# Patient Record
Sex: FEMALE | Race: OTHER | NOT HISPANIC OR LATINO
[De-identification: names, ages, dates, MRNs, and addresses within clinical notes are randomized per-mention and may not be internally consistent; named-entity substitution may affect disease eponyms.]

---

## 2017-11-13 PROBLEM — Z00.00 ENCOUNTER FOR PREVENTIVE HEALTH EXAMINATION: Status: ACTIVE | Noted: 2017-11-13

## 2020-03-31 ENCOUNTER — LABORATORY RESULT (OUTPATIENT)
Age: 55
End: 2020-03-31

## 2020-04-26 ENCOUNTER — MESSAGE (OUTPATIENT)
Age: 55
End: 2020-04-26

## 2020-04-29 LAB
SARS-COV-2 IGG SERPL IA-ACNC: 3.6 INDEX
SARS-COV-2 IGG SERPL QL IA: POSITIVE

## 2020-12-29 ENCOUNTER — NON-APPOINTMENT (OUTPATIENT)
Age: 55
End: 2020-12-29

## 2021-01-25 ENCOUNTER — LABORATORY RESULT (OUTPATIENT)
Age: 56
End: 2021-01-25

## 2021-01-25 ENCOUNTER — APPOINTMENT (OUTPATIENT)
Dept: RHEUMATOLOGY | Facility: CLINIC | Age: 56
End: 2021-01-25
Payer: COMMERCIAL

## 2021-01-25 VITALS
HEART RATE: 78 BPM | SYSTOLIC BLOOD PRESSURE: 127 MMHG | OXYGEN SATURATION: 98 % | HEIGHT: 65 IN | WEIGHT: 165 LBS | BODY MASS INDEX: 27.49 KG/M2 | TEMPERATURE: 97.2 F | DIASTOLIC BLOOD PRESSURE: 90 MMHG

## 2021-01-25 DIAGNOSIS — Z86.79 PERSONAL HISTORY OF OTHER DISEASES OF THE CIRCULATORY SYSTEM: ICD-10-CM

## 2021-01-25 DIAGNOSIS — Z82.61 FAMILY HISTORY OF ARTHRITIS: ICD-10-CM

## 2021-01-25 DIAGNOSIS — Z78.9 OTHER SPECIFIED HEALTH STATUS: ICD-10-CM

## 2021-01-25 DIAGNOSIS — M25.50 PAIN IN UNSPECIFIED JOINT: ICD-10-CM

## 2021-01-25 DIAGNOSIS — Z87.19 PERSONAL HISTORY OF OTHER DISEASES OF THE DIGESTIVE SYSTEM: ICD-10-CM

## 2021-01-25 DIAGNOSIS — Z82.49 FAMILY HISTORY OF ISCHEMIC HEART DISEASE AND OTHER DISEASES OF THE CIRCULATORY SYSTEM: ICD-10-CM

## 2021-01-25 DIAGNOSIS — Z80.1 FAMILY HISTORY OF MALIGNANT NEOPLASM OF TRACHEA, BRONCHUS AND LUNG: ICD-10-CM

## 2021-01-25 PROCEDURE — 99072 ADDL SUPL MATRL&STAF TM PHE: CPT

## 2021-01-25 PROCEDURE — 99203 OFFICE O/P NEW LOW 30 MIN: CPT

## 2021-01-25 RX ORDER — DICLOFENAC SODIUM 1% 10 MG/G
1 GEL TOPICAL
Qty: 1 | Refills: 2 | Status: ACTIVE | COMMUNITY
Start: 2021-01-25 | End: 1900-01-01

## 2021-01-25 RX ORDER — PRASTERONE (DHEA) 50 MG
CAPSULE ORAL
Refills: 0 | Status: ACTIVE | COMMUNITY

## 2021-01-25 RX ORDER — AMLODIPINE BESYLATE 5 MG/1
5 TABLET ORAL
Refills: 0 | Status: ACTIVE | COMMUNITY

## 2021-01-25 RX ORDER — CYCLOBENZAPRINE HYDROCHLORIDE 10 MG/1
10 TABLET, FILM COATED ORAL
Qty: 60 | Refills: 0 | Status: ACTIVE | COMMUNITY
Start: 2020-10-05

## 2021-01-27 ENCOUNTER — OUTPATIENT (OUTPATIENT)
Dept: OUTPATIENT SERVICES | Facility: HOSPITAL | Age: 56
LOS: 1 days | End: 2021-01-27
Payer: COMMERCIAL

## 2021-01-27 ENCOUNTER — RESULT REVIEW (OUTPATIENT)
Age: 56
End: 2021-01-27

## 2021-01-27 ENCOUNTER — APPOINTMENT (OUTPATIENT)
Dept: RADIOLOGY | Facility: CLINIC | Age: 56
End: 2021-01-27
Payer: COMMERCIAL

## 2021-01-27 DIAGNOSIS — Z00.8 ENCOUNTER FOR OTHER GENERAL EXAMINATION: ICD-10-CM

## 2021-01-27 PROCEDURE — 73130 X-RAY EXAM OF HAND: CPT | Mod: 26,50

## 2021-01-27 PROCEDURE — 73502 X-RAY EXAM HIP UNI 2-3 VIEWS: CPT

## 2021-01-27 PROCEDURE — 73130 X-RAY EXAM OF HAND: CPT

## 2021-01-27 PROCEDURE — 72100 X-RAY EXAM L-S SPINE 2/3 VWS: CPT | Mod: 26

## 2021-01-27 PROCEDURE — 72100 X-RAY EXAM L-S SPINE 2/3 VWS: CPT

## 2021-01-27 PROCEDURE — 73502 X-RAY EXAM HIP UNI 2-3 VIEWS: CPT | Mod: 26,LT

## 2021-01-29 LAB
25(OH)D3 SERPL-MCNC: 34.3 NG/ML
ALBUMIN SERPL ELPH-MCNC: 4.6 G/DL
ALP BLD-CCNC: 73 U/L
ALT SERPL-CCNC: 20 U/L
ANION GAP SERPL CALC-SCNC: 15 MMOL/L
APPEARANCE: CLEAR
AST SERPL-CCNC: 17 U/L
BASOPHILS # BLD AUTO: 0.04 K/UL
BASOPHILS NFR BLD AUTO: 0.9 %
BILIRUB SERPL-MCNC: 0.6 MG/DL
BILIRUBIN URINE: NEGATIVE
BLOOD URINE: ABNORMAL
BUN SERPL-MCNC: 15 MG/DL
CALCIUM SERPL-MCNC: 9.7 MG/DL
CCP AB SER IA-ACNC: <8 UNITS
CHLORIDE SERPL-SCNC: 103 MMOL/L
CHOLEST SERPL-MCNC: 192 MG/DL
CK SERPL-CCNC: 121 U/L
CO2 SERPL-SCNC: 23 MMOL/L
COLOR: NORMAL
CREAT SERPL-MCNC: 0.78 MG/DL
CREAT SPEC-SCNC: 159 MG/DL
CREAT/PROT UR: 0.1 RATIO
EOSINOPHIL # BLD AUTO: 0.09 K/UL
EOSINOPHIL NFR BLD AUTO: 1.9 %
ERYTHROCYTE [SEDIMENTATION RATE] IN BLOOD BY WESTERGREN METHOD: 7 MM/HR
ESTIMATED AVERAGE GLUCOSE: 103 MG/DL
GLUCOSE QUALITATIVE U: NEGATIVE
GLUCOSE SERPL-MCNC: 80 MG/DL
HBA1C MFR BLD HPLC: 5.2 %
HCT VFR BLD CALC: 46.4 %
HDLC SERPL-MCNC: 56 MG/DL
HGB BLD-MCNC: 14.7 G/DL
IMM GRANULOCYTES NFR BLD AUTO: 0.2 %
KETONES URINE: NEGATIVE
LDLC SERPL CALC-MCNC: 120 MG/DL
LEUKOCYTE ESTERASE URINE: ABNORMAL
LYMPHOCYTES # BLD AUTO: 1.8 K/UL
LYMPHOCYTES NFR BLD AUTO: 39 %
MAN DIFF?: NORMAL
MCHC RBC-ENTMCNC: 30.6 PG
MCHC RBC-ENTMCNC: 31.7 GM/DL
MCV RBC AUTO: 96.7 FL
MONOCYTES # BLD AUTO: 0.39 K/UL
MONOCYTES NFR BLD AUTO: 8.4 %
NEUTROPHILS # BLD AUTO: 2.29 K/UL
NEUTROPHILS NFR BLD AUTO: 49.6 %
NITRITE URINE: NEGATIVE
NONHDLC SERPL-MCNC: 136 MG/DL
PH URINE: 6
PLATELET # BLD AUTO: 230 K/UL
POTASSIUM SERPL-SCNC: 4.2 MMOL/L
PROT SERPL-MCNC: 7.1 G/DL
PROT UR-MCNC: 11 MG/DL
PROTEIN URINE: NEGATIVE
RBC # BLD: 4.8 M/UL
RBC # FLD: 12.3 %
RF+CCP IGG SER-IMP: NEGATIVE
RHEUMATOID FACT SER QL: 294 IU/ML
SODIUM SERPL-SCNC: 142 MMOL/L
SPECIFIC GRAVITY URINE: >=1.03
TRIGL SERPL-MCNC: 79 MG/DL
TSH SERPL-ACNC: 1.16 UIU/ML
UROBILINOGEN URINE: NORMAL
WBC # FLD AUTO: 4.62 K/UL

## 2021-02-09 ENCOUNTER — TRANSCRIPTION ENCOUNTER (OUTPATIENT)
Age: 56
End: 2021-02-09

## 2021-02-16 ENCOUNTER — APPOINTMENT (OUTPATIENT)
Dept: GASTROENTEROLOGY | Facility: CLINIC | Age: 56
End: 2021-02-16
Payer: COMMERCIAL

## 2021-02-16 VITALS
WEIGHT: 164 LBS | BODY MASS INDEX: 27.32 KG/M2 | TEMPERATURE: 97.1 F | SYSTOLIC BLOOD PRESSURE: 138 MMHG | HEIGHT: 65 IN | DIASTOLIC BLOOD PRESSURE: 90 MMHG

## 2021-02-16 DIAGNOSIS — M19.90 UNSPECIFIED OSTEOARTHRITIS, UNSPECIFIED SITE: ICD-10-CM

## 2021-02-16 DIAGNOSIS — Z20.822 CONTACT WITH AND (SUSPECTED) EXPOSURE TO COVID-19: ICD-10-CM

## 2021-02-16 DIAGNOSIS — Z12.11 ENCOUNTER FOR SCREENING FOR MALIGNANT NEOPLASM OF COLON: ICD-10-CM

## 2021-02-16 DIAGNOSIS — Z01.818 ENCOUNTER FOR OTHER PREPROCEDURAL EXAMINATION: ICD-10-CM

## 2021-02-16 DIAGNOSIS — S82.853S: ICD-10-CM

## 2021-02-16 DIAGNOSIS — Z86.79 PERSONAL HISTORY OF OTHER DISEASES OF THE CIRCULATORY SYSTEM: ICD-10-CM

## 2021-02-16 DIAGNOSIS — M51.9 UNSPECIFIED THORACIC, THORACOLUMBAR AND LUMBOSACRAL INTERVERTEBRAL DISC DISORDER: ICD-10-CM

## 2021-02-16 PROCEDURE — 99203 OFFICE O/P NEW LOW 30 MIN: CPT

## 2021-02-16 PROCEDURE — 99072 ADDL SUPL MATRL&STAF TM PHE: CPT

## 2021-02-16 RX ORDER — SODIUM SULFATE, POTASSIUM SULFATE, MAGNESIUM SULFATE 17.5; 3.13; 1.6 G/ML; G/ML; G/ML
17.5-3.13-1.6 SOLUTION, CONCENTRATE ORAL
Qty: 1 | Refills: 0 | Status: ACTIVE | COMMUNITY
Start: 2021-02-16 | End: 1900-01-01

## 2021-02-16 NOTE — HISTORY OF PRESENT ILLNESS
[FreeTextEntry1] : Patient returns to the office today for arrangement of colonoscopy. It has been several years since last examination. Patient has history of adenomatous polyps. Patient looks and feels well and offers no complaints except for recent low back pain. Patient has a herniated disc in the lumbar area. She denies nausea vomiting fever chills rectal bleeding or melena.

## 2021-02-16 NOTE — ASSESSMENT
[FreeTextEntry1] : Impression and plan\par \par Patient came to the office today to arrange for colonoscopy. She has a history of adenomatous polyps The risks benefits alternatives and limitations of procedure were discussed. Further suggestions will follow pending above.patient will schedule examination when her back is feeling better.

## 2021-03-09 ENCOUNTER — APPOINTMENT (OUTPATIENT)
Dept: GASTROENTEROLOGY | Facility: AMBULATORY MEDICAL SERVICES | Age: 56
End: 2021-03-09

## 2021-04-09 LAB — SARS-COV-2 N GENE NPH QL NAA+PROBE: NOT DETECTED

## 2021-04-10 RX ORDER — SODIUM PICOSULFATE, MAGNESIUM OXIDE, AND ANHYDROUS CITRIC ACID 10; 3.5; 12 MG/160ML; G/160ML; G/160ML
10-3.5-12 MG-GM LIQUID ORAL
Qty: 1 | Refills: 0 | Status: ACTIVE | COMMUNITY
Start: 2021-04-10 | End: 1900-01-01

## 2021-04-13 ENCOUNTER — APPOINTMENT (OUTPATIENT)
Dept: GASTROENTEROLOGY | Facility: AMBULATORY MEDICAL SERVICES | Age: 56
End: 2021-04-13
Payer: COMMERCIAL

## 2021-04-13 PROCEDURE — 45380 COLONOSCOPY AND BIOPSY: CPT

## 2021-06-14 ENCOUNTER — TRANSCRIPTION ENCOUNTER (OUTPATIENT)
Age: 56
End: 2021-06-14

## 2021-06-24 ENCOUNTER — TRANSCRIPTION ENCOUNTER (OUTPATIENT)
Age: 56
End: 2021-06-24

## 2021-07-06 ENCOUNTER — APPOINTMENT (OUTPATIENT)
Dept: GASTROENTEROLOGY | Facility: AMBULATORY MEDICAL SERVICES | Age: 56
End: 2021-07-06
Payer: COMMERCIAL

## 2021-07-06 PROCEDURE — 45378 DIAGNOSTIC COLONOSCOPY: CPT

## 2021-12-26 ENCOUNTER — RESULT CHARGE (OUTPATIENT)
Age: 56
End: 2021-12-26

## 2021-12-28 ENCOUNTER — RESULT CHARGE (OUTPATIENT)
Age: 56
End: 2021-12-28

## 2021-12-28 LAB
SARS-COV-2 AG RESP QL IA.RAPID: NEGATIVE
SARS-COV-2 AG RESP QL IA.RAPID: NEGATIVE

## 2023-06-28 DIAGNOSIS — R10.10 UPPER ABDOMINAL PAIN, UNSPECIFIED: ICD-10-CM

## 2023-06-30 ENCOUNTER — TRANSCRIPTION ENCOUNTER (OUTPATIENT)
Age: 58
End: 2023-06-30

## 2023-07-02 LAB
ALBUMIN SERPL ELPH-MCNC: 4.7 G/DL
ALP BLD-CCNC: 78 U/L
ALT SERPL-CCNC: 230 U/L
AMYLASE/CREAT SERPL: 43 U/L
ANION GAP SERPL CALC-SCNC: 14 MMOL/L
AST SERPL-CCNC: 366 U/L
BILIRUB SERPL-MCNC: 1.4 MG/DL
BUN SERPL-MCNC: 15 MG/DL
CALCIUM SERPL-MCNC: 9.9 MG/DL
CHLORIDE SERPL-SCNC: 102 MMOL/L
CHOLEST SERPL-MCNC: 202 MG/DL
CO2 SERPL-SCNC: 25 MMOL/L
CREAT SERPL-MCNC: 0.7 MG/DL
EGFR: 100 ML/MIN/1.73M2
GLUCOSE SERPL-MCNC: 93 MG/DL
HDLC SERPL-MCNC: 54 MG/DL
LDH SERPL-CCNC: 472 U/L
LDLC SERPL CALC-MCNC: 131 MG/DL
LPL SERPL-CCNC: 32 U/L
NONHDLC SERPL-MCNC: 148 MG/DL
POTASSIUM SERPL-SCNC: 4.1 MMOL/L
PROT SERPL-MCNC: 7.1 G/DL
SODIUM SERPL-SCNC: 141 MMOL/L
TRIGL SERPL-MCNC: 85 MG/DL
TSH SERPL-ACNC: 0.58 UIU/ML

## 2023-07-13 ENCOUNTER — APPOINTMENT (OUTPATIENT)
Dept: GASTROENTEROLOGY | Facility: CLINIC | Age: 58
End: 2023-07-13
Payer: COMMERCIAL

## 2023-07-13 VITALS
SYSTOLIC BLOOD PRESSURE: 118 MMHG | TEMPERATURE: 97.5 F | BODY MASS INDEX: 26.33 KG/M2 | WEIGHT: 158 LBS | HEIGHT: 65 IN | DIASTOLIC BLOOD PRESSURE: 80 MMHG

## 2023-07-13 PROCEDURE — 99214 OFFICE O/P EST MOD 30 MIN: CPT

## 2023-07-13 NOTE — HISTORY OF PRESENT ILLNESS
[FreeTextEntry1] : Patient came to the office today to review recent events.  Patient was hospitalized with acute epigastric pain and was found to have rising transaminase levels associated with elevated bilirubin and alk phos.  The patient pain was quite severe prompting hospitalization.  During that stay she underwent testing including CT and MRCP.  These tests revealed previous cholecystectomy as well as physiologically dilated CBD but no stones demonstrated.  Patient also has large renal cysts which are chronic.  The patient tells me that she lingered for several days in the hospital and was discharged with improvement in liver studies but not normalization.  At this time she feels well and offers no complaints of nausea vomiting fever chills rectal bleeding melena or abdominal pain.  She is concerned why she had this transient spike and if it could happen again.  The patient's laboratory studies and scan results are translated into the chart from recent hospitalization.
Anxiety    Dementia    HTN (hypertension)    Spinal stenosis

## 2023-07-13 NOTE — ASSESSMENT
[FreeTextEntry1] : Impression and plan\par Patient had recent hospitalization prompted by cute onset of epigastric discomfort.  The patient has had previous cholecystectomy and during hospitalization transaminase readings were elevated along with alkaline phosphatase and bilirubin.  These numbers normalized during her hospital stay and most recently AST was normal and ALT remained at approximately 100.  The patient denies current pain and describes both acute onset of abdominal pain and almost instantaneous relief.  CT scanning and MR CP were unrevealing as to an etiology.\par My suspicion is that patient had transient CBD obstruction and perhaps passed an unseen stone resulting in transient elevation of her numbers now normalizing.  The rapid resolution of symptoms and improvement in serology supports this.  Patient is scheduled to have repeat blood work tomorrow to see if there is a continued improving trend.  I will suggest that patient have endoscopic ultrasound through the [facility] team to assure that she has no residual stones or other despite negative MRCP.

## 2023-07-14 ENCOUNTER — NON-APPOINTMENT (OUTPATIENT)
Age: 58
End: 2023-07-14

## 2023-07-18 ENCOUNTER — OUTPATIENT (OUTPATIENT)
Dept: OUTPATIENT SERVICES | Facility: HOSPITAL | Age: 58
LOS: 1 days | End: 2023-07-18
Payer: COMMERCIAL

## 2023-07-18 VITALS
RESPIRATION RATE: 18 BRPM | TEMPERATURE: 98 F | DIASTOLIC BLOOD PRESSURE: 85 MMHG | HEART RATE: 73 BPM | OXYGEN SATURATION: 98 % | WEIGHT: 158.95 LBS | HEIGHT: 64.5 IN | SYSTOLIC BLOOD PRESSURE: 136 MMHG

## 2023-07-18 DIAGNOSIS — Z90.49 ACQUIRED ABSENCE OF OTHER SPECIFIED PARTS OF DIGESTIVE TRACT: Chronic | ICD-10-CM

## 2023-07-18 DIAGNOSIS — R74.8 ABNORMAL LEVELS OF OTHER SERUM ENZYMES: ICD-10-CM

## 2023-07-18 DIAGNOSIS — I10 ESSENTIAL (PRIMARY) HYPERTENSION: ICD-10-CM

## 2023-07-18 DIAGNOSIS — S82.852S DISPLACED TRIMALLEOLAR FRACTURE OF LEFT LOWER LEG, SEQUELA: Chronic | ICD-10-CM

## 2023-07-18 DIAGNOSIS — Z98.890 OTHER SPECIFIED POSTPROCEDURAL STATES: Chronic | ICD-10-CM

## 2023-07-18 LAB
ALBUMIN SERPL ELPH-MCNC: 4.5 G/DL — SIGNIFICANT CHANGE UP (ref 3.3–5)
ALP SERPL-CCNC: 71 U/L — SIGNIFICANT CHANGE UP (ref 40–120)
ALT FLD-CCNC: 25 U/L — SIGNIFICANT CHANGE UP (ref 4–33)
ANION GAP SERPL CALC-SCNC: 12 MMOL/L — SIGNIFICANT CHANGE UP (ref 7–14)
AST SERPL-CCNC: 16 U/L — SIGNIFICANT CHANGE UP (ref 4–32)
BILIRUB SERPL-MCNC: 0.3 MG/DL — SIGNIFICANT CHANGE UP (ref 0.2–1.2)
BUN SERPL-MCNC: 16 MG/DL — SIGNIFICANT CHANGE UP (ref 7–23)
CALCIUM SERPL-MCNC: 9.7 MG/DL — SIGNIFICANT CHANGE UP (ref 8.4–10.5)
CHLORIDE SERPL-SCNC: 105 MMOL/L — SIGNIFICANT CHANGE UP (ref 98–107)
CO2 SERPL-SCNC: 27 MMOL/L — SIGNIFICANT CHANGE UP (ref 22–31)
CREAT SERPL-MCNC: 0.96 MG/DL — SIGNIFICANT CHANGE UP (ref 0.5–1.3)
EGFR: 69 ML/MIN/1.73M2 — SIGNIFICANT CHANGE UP
GLUCOSE SERPL-MCNC: 83 MG/DL — SIGNIFICANT CHANGE UP (ref 70–99)
HCT VFR BLD CALC: 41 % — SIGNIFICANT CHANGE UP (ref 34.5–45)
HGB BLD-MCNC: 13.3 G/DL — SIGNIFICANT CHANGE UP (ref 11.5–15.5)
MCHC RBC-ENTMCNC: 30.7 PG — SIGNIFICANT CHANGE UP (ref 27–34)
MCHC RBC-ENTMCNC: 32.4 GM/DL — SIGNIFICANT CHANGE UP (ref 32–36)
MCV RBC AUTO: 94.7 FL — SIGNIFICANT CHANGE UP (ref 80–100)
NRBC # BLD: 0 /100 WBCS — SIGNIFICANT CHANGE UP (ref 0–0)
NRBC # FLD: 0 K/UL — SIGNIFICANT CHANGE UP (ref 0–0)
PLATELET # BLD AUTO: 277 K/UL — SIGNIFICANT CHANGE UP (ref 150–400)
POTASSIUM SERPL-MCNC: 4.7 MMOL/L — SIGNIFICANT CHANGE UP (ref 3.5–5.3)
POTASSIUM SERPL-SCNC: 4.7 MMOL/L — SIGNIFICANT CHANGE UP (ref 3.5–5.3)
PROT SERPL-MCNC: 6.8 G/DL — SIGNIFICANT CHANGE UP (ref 6–8.3)
RBC # BLD: 4.33 M/UL — SIGNIFICANT CHANGE UP (ref 3.8–5.2)
RBC # FLD: 12.2 % — SIGNIFICANT CHANGE UP (ref 10.3–14.5)
SODIUM SERPL-SCNC: 144 MMOL/L — SIGNIFICANT CHANGE UP (ref 135–145)
WBC # BLD: 6.05 K/UL — SIGNIFICANT CHANGE UP (ref 3.8–10.5)
WBC # FLD AUTO: 6.05 K/UL — SIGNIFICANT CHANGE UP (ref 3.8–10.5)

## 2023-07-18 PROCEDURE — 93010 ELECTROCARDIOGRAM REPORT: CPT

## 2023-07-18 RX ORDER — AMLODIPINE BESYLATE 2.5 MG/1
1 TABLET ORAL
Refills: 0 | DISCHARGE

## 2023-07-18 RX ORDER — FAMOTIDINE 10 MG/ML
1 INJECTION INTRAVENOUS
Refills: 0 | DISCHARGE

## 2023-07-18 RX ORDER — OMEPRAZOLE 10 MG/1
1 CAPSULE, DELAYED RELEASE ORAL
Refills: 0 | DISCHARGE

## 2023-07-18 NOTE — H&P PST ADULT - GENERAL
Antimicrobial Length of Therapy:    Day 5/7 Zosyn    Thank you,    Anna Alexander, PharmD  12/23/2022  14:02 EST         details…

## 2023-07-18 NOTE — H&P PST ADULT - NSICDXPASTSURGICALHX_GEN_ALL_CORE_FT
PAST SURGICAL HISTORY:  H/O colonoscopy     History of cholecystectomy     Left trimalleolar fracture, sequela

## 2023-07-18 NOTE — H&P PST ADULT - HISTORY OF PRESENT ILLNESS
58 year old female with pmhx of HTN, GERD, presents for pre-op evaluation for diagnosis of Abnormal levels of other serum enzymes. Pt is scheduled for Endoscopic Ultrasound +/-ERCP. Pt went to [facility] ER for abdominal pain in June 2023. Pt had CT abdomen which was negative and elevated liver enzymes.

## 2023-07-18 NOTE — H&P PST ADULT - NSICDXFAMILYHX_GEN_ALL_CORE_FT
FAMILY HISTORY:  Father  Still living? Unknown  FH: hypertension, Age at diagnosis: Age Unknown  FH: lung cancer, Age at diagnosis: Age Unknown    Mother  Still living? Unknown  FH: heart disease, Age at diagnosis: Age Unknown  FH: hypertension, Age at diagnosis: Age Unknown

## 2023-07-18 NOTE — H&P PST ADULT - FUNCTIONAL STATUS
METS 6-Walks 2x a week briskly, does weights, able to climb up 2 flights of stairs, do heavy house chores, walk up hill without symptoms./4-10 METS

## 2023-07-18 NOTE — H&P PST ADULT - PROBLEM SELECTOR PLAN 1
Patient tentatively scheduled for Endoscopic Ultrasound +/-ERCP on 7/24/23.  Pre-op instructions provided. Pt given verbal and written instructions. Pt verbalized understanding with return demonstration.   Patient instructed to take Omeprazole with a sip of water on the morning of procedure.   CBC, CMP and EKG were done at [facility].  No further evaluations requested.

## 2023-07-18 NOTE — H&P PST ADULT - NEGATIVE GENERAL GENITOURINARY SYMPTOMS
no hematuria/no flank pain L/no flank pain R/no incontinence/no dysuria/no urinary hesitancy/normal urinary frequency/no nocturia

## 2023-07-18 NOTE — H&P PST ADULT - NSICDXPASTMEDICALHX_GEN_ALL_CORE_FT
PAST MEDICAL HISTORY:  Abnormal levels of other serum enzymes     GERD (gastroesophageal reflux disease)     Hypertension     Lumbar herniated disc     Osteoarthritis

## 2023-07-18 NOTE — H&P PST ADULT - NSANTHTOTALSCORECAL_ENT_A_CORE
Pt moved room within ER.  Patient remains ER hold.  Family updated on status.  Waiting on medications from pharmacy.      Narda Fernando RN  01/10/18 7004     2

## 2023-07-21 NOTE — ASU PATIENT PROFILE, ADULT - FALL HARM RISK - UNIVERSAL INTERVENTIONS
Bed in lowest position, wheels locked, appropriate side rails in place/Call bell, personal items and telephone in reach/Instruct patient to call for assistance before getting out of bed or chair/Non-slip footwear when patient is out of bed/[place] to call system/Physically safe environment - no spills, clutter or unnecessary equipment/Purposeful Proactive Rounding/Room/bathroom lighting operational, light cord in reach

## 2023-07-24 ENCOUNTER — APPOINTMENT (OUTPATIENT)
Dept: GASTROENTEROLOGY | Facility: HOSPITAL | Age: 58
End: 2023-07-24

## 2023-07-24 ENCOUNTER — OUTPATIENT (OUTPATIENT)
Dept: OUTPATIENT SERVICES | Facility: HOSPITAL | Age: 58
LOS: 1 days | Discharge: HOME | End: 2023-07-24
Payer: COMMERCIAL

## 2023-07-24 ENCOUNTER — NON-APPOINTMENT (OUTPATIENT)
Age: 58
End: 2023-07-24

## 2023-07-24 ENCOUNTER — TRANSCRIPTION ENCOUNTER (OUTPATIENT)
Age: 58
End: 2023-07-24

## 2023-07-24 VITALS
HEIGHT: 65 IN | DIASTOLIC BLOOD PRESSURE: 90 MMHG | HEART RATE: 81 BPM | OXYGEN SATURATION: 96 % | TEMPERATURE: 98 F | WEIGHT: 149.91 LBS | RESPIRATION RATE: 14 BRPM | SYSTOLIC BLOOD PRESSURE: 125 MMHG

## 2023-07-24 VITALS — RESPIRATION RATE: 15 BRPM | SYSTOLIC BLOOD PRESSURE: 135 MMHG | DIASTOLIC BLOOD PRESSURE: 75 MMHG | HEART RATE: 66 BPM

## 2023-07-24 DIAGNOSIS — Z98.890 OTHER SPECIFIED POSTPROCEDURAL STATES: Chronic | ICD-10-CM

## 2023-07-24 DIAGNOSIS — Z90.49 ACQUIRED ABSENCE OF OTHER SPECIFIED PARTS OF DIGESTIVE TRACT: Chronic | ICD-10-CM

## 2023-07-24 DIAGNOSIS — S82.852S DISPLACED TRIMALLEOLAR FRACTURE OF LEFT LOWER LEG, SEQUELA: Chronic | ICD-10-CM

## 2023-07-24 DIAGNOSIS — R74.8 ABNORMAL LEVELS OF OTHER SERUM ENZYMES: ICD-10-CM

## 2023-07-24 PROCEDURE — 43259 EGD US EXAM DUODENUM/JEJUNUM: CPT | Mod: GC

## 2023-07-24 PROCEDURE — 74330 X-RAY BILE/PANC ENDOSCOPY: CPT | Mod: 26,GC

## 2023-07-24 PROCEDURE — 43264 ERCP REMOVE DUCT CALCULI: CPT | Mod: GC

## 2023-07-24 PROCEDURE — 43274 ERCP DUCT STENT PLACEMENT: CPT | Mod: GC

## 2023-07-24 DEVICE — GWIRE JAG REVOLUTION 260CM: Type: IMPLANTABLE DEVICE | Status: FUNCTIONAL

## 2023-07-24 DEVICE — STENT PANCREAS CATH PUSH 5FRX170CM: Type: IMPLANTABLE DEVICE | Status: FUNCTIONAL

## 2023-07-24 DEVICE — CATH BLLN EXTRACT DIST GUIDE WIRE 15MM 3LUM: Type: IMPLANTABLE DEVICE | Status: FUNCTIONAL

## 2023-07-24 DEVICE — GWIRE JAGTOME REVOLUTION RX 260CM/0.025IN: Type: IMPLANTABLE DEVICE | Status: FUNCTIONAL

## 2023-07-24 DEVICE — STENT PANC ZIMMON 5FR 5CM: Type: IMPLANTABLE DEVICE | Status: FUNCTIONAL

## 2023-07-24 RX ORDER — INDOMETHACIN 50 MG
100 CAPSULE ORAL ONCE
Refills: 0 | Status: DISCONTINUED | OUTPATIENT
Start: 2023-07-24 | End: 2023-08-07

## 2023-07-24 RX ORDER — SODIUM CHLORIDE 9 MG/ML
1000 INJECTION, SOLUTION INTRAVENOUS ONCE
Refills: 0 | Status: DISCONTINUED | OUTPATIENT
Start: 2023-07-24 | End: 2023-08-07

## 2023-07-24 RX ORDER — CIPROFLOXACIN LACTATE 400MG/40ML
400 VIAL (ML) INTRAVENOUS ONCE
Refills: 0 | Status: DISCONTINUED | OUTPATIENT
Start: 2023-07-24 | End: 2023-08-07

## 2023-07-24 RX ORDER — SODIUM CHLORIDE 9 MG/ML
500 INJECTION, SOLUTION INTRAVENOUS
Refills: 0 | Status: DISCONTINUED | OUTPATIENT
Start: 2023-07-24 | End: 2023-08-07

## 2023-07-24 NOTE — PRE PROCEDURE NOTE - PRE PROCEDURE EVALUATION
Attending Physician:            NANETTE                Procedure: EGD/EUS+/-ERCP    Indication for Procedure:  Biliary obstruction  ________________________________________________________  PAST MEDICAL & SURGICAL HISTORY:  Hypertension      GERD (gastroesophageal reflux disease)      Lumbar herniated disc      Abnormal levels of other serum enzymes      Osteoarthritis      History of cholecystectomy      Left trimalleolar fracture, sequela      H/O colonoscopy        ALLERGIES:  No Known Allergies    HOME MEDICATIONS:  amLODIPine 5 mg oral tablet: 1 tab(s) orally once a day  omeprazole 40 mg oral delayed release capsule: 1 cap(s) orally once a day  Pepcid 40 mg oral tablet: 1 tab(s) orally once a day (at bedtime)    AICD/PPM: [ ] yes   [x ] no    PERTINENT LAB DATA:                      PHYSICAL EXAMINATION:    T(C): --  HR: --  BP: --  RR: --  SpO2: --    Constitutional: NAD  HEENT: PERRLA, EOMI,    Neck:  No JVD  Respiratory: CTAB/L  Cardiovascular: S1 and S2  Gastrointestinal: BS+, soft, NT/ND  Extremities: No peripheral edema  Neurological: A/O x 3, no focal deficits  Psychiatric: Normal mood, normal affect  Skin: No rashes    ASA Class: I [ ]  II [x ]  III [ ]  IV [ ]    COMMENTS:    The patient is a suitable candidate for the planned procedure unless box checked [ ]  No, explain:    I explained the risks (bleeding, infection perforation, pancreatitis, CV risk, anesthesia risk)/b/a with the patient. The patient agrees to proceed.

## 2023-07-24 NOTE — ASU DISCHARGE PLAN (ADULT/PEDIATRIC) - NS MD DC FALL RISK RISK
For information on Fall & Injury Prevention, visit: [email or web address] OR  [email or web address] OR  https://www.cdc.gov/steadi/patient.html

## 2023-07-25 ENCOUNTER — TRANSCRIPTION ENCOUNTER (OUTPATIENT)
Age: 58
End: 2023-07-25

## 2023-07-25 ENCOUNTER — INPATIENT (INPATIENT)
Facility: HOSPITAL | Age: 58
LOS: 9 days | Discharge: HOME | End: 2023-08-04
Attending: STUDENT IN AN ORGANIZED HEALTH CARE EDUCATION/TRAINING PROGRAM | Admitting: STUDENT IN AN ORGANIZED HEALTH CARE EDUCATION/TRAINING PROGRAM
Payer: COMMERCIAL

## 2023-07-25 VITALS
HEART RATE: 73 BPM | HEIGHT: 65 IN | OXYGEN SATURATION: 100 % | RESPIRATION RATE: 18 BRPM | SYSTOLIC BLOOD PRESSURE: 113 MMHG | TEMPERATURE: 98 F | DIASTOLIC BLOOD PRESSURE: 91 MMHG

## 2023-07-25 DIAGNOSIS — Z29.9 ENCOUNTER FOR PROPHYLACTIC MEASURES, UNSPECIFIED: ICD-10-CM

## 2023-07-25 DIAGNOSIS — K85.90 ACUTE PANCREATITIS WITHOUT NECROSIS OR INFECTION, UNSPECIFIED: ICD-10-CM

## 2023-07-25 DIAGNOSIS — I10 ESSENTIAL (PRIMARY) HYPERTENSION: ICD-10-CM

## 2023-07-25 DIAGNOSIS — Z90.49 ACQUIRED ABSENCE OF OTHER SPECIFIED PARTS OF DIGESTIVE TRACT: Chronic | ICD-10-CM

## 2023-07-25 DIAGNOSIS — S82.852S DISPLACED TRIMALLEOLAR FRACTURE OF LEFT LOWER LEG, SEQUELA: Chronic | ICD-10-CM

## 2023-07-25 DIAGNOSIS — Z98.890 OTHER SPECIFIED POSTPROCEDURAL STATES: Chronic | ICD-10-CM

## 2023-07-25 PROBLEM — M19.90 UNSPECIFIED OSTEOARTHRITIS, UNSPECIFIED SITE: Chronic | Status: ACTIVE | Noted: 2023-07-18

## 2023-07-25 PROBLEM — K21.9 GASTRO-ESOPHAGEAL REFLUX DISEASE WITHOUT ESOPHAGITIS: Chronic | Status: ACTIVE | Noted: 2023-07-18

## 2023-07-25 PROBLEM — M51.26 OTHER INTERVERTEBRAL DISC DISPLACEMENT, LUMBAR REGION: Chronic | Status: ACTIVE | Noted: 2023-07-18

## 2023-07-25 LAB
ALBUMIN SERPL ELPH-MCNC: 3.9 G/DL — SIGNIFICANT CHANGE UP (ref 3.3–5)
ALP SERPL-CCNC: 68 U/L — SIGNIFICANT CHANGE UP (ref 40–120)
ALT FLD-CCNC: 24 U/L — SIGNIFICANT CHANGE UP (ref 4–33)
ANION GAP SERPL CALC-SCNC: 16 MMOL/L — HIGH (ref 7–14)
APTT BLD: 24.9 SEC — LOW (ref 27–36.3)
AST SERPL-CCNC: 21 U/L — SIGNIFICANT CHANGE UP (ref 4–32)
BASE EXCESS BLDV CALC-SCNC: 1.7 MMOL/L — SIGNIFICANT CHANGE UP (ref -2–3)
BASOPHILS # BLD AUTO: 0.04 K/UL — SIGNIFICANT CHANGE UP (ref 0–0.2)
BASOPHILS NFR BLD AUTO: 0.4 % — SIGNIFICANT CHANGE UP (ref 0–2)
BILIRUB SERPL-MCNC: 0.6 MG/DL — SIGNIFICANT CHANGE UP (ref 0.2–1.2)
BLD GP AB SCN SERPL QL: NEGATIVE — SIGNIFICANT CHANGE UP
BLOOD GAS VENOUS COMPREHENSIVE RESULT: SIGNIFICANT CHANGE UP
BUN SERPL-MCNC: 10 MG/DL — SIGNIFICANT CHANGE UP (ref 7–23)
CALCIUM SERPL-MCNC: 9.2 MG/DL — SIGNIFICANT CHANGE UP (ref 8.4–10.5)
CHLORIDE BLDV-SCNC: 105 MMOL/L — SIGNIFICANT CHANGE UP (ref 96–108)
CHLORIDE SERPL-SCNC: 103 MMOL/L — SIGNIFICANT CHANGE UP (ref 98–107)
CO2 BLDV-SCNC: 28.6 MMOL/L — HIGH (ref 22–26)
CO2 SERPL-SCNC: 19 MMOL/L — LOW (ref 22–31)
CREAT SERPL-MCNC: 0.71 MG/DL — SIGNIFICANT CHANGE UP (ref 0.5–1.3)
EGFR: 98 ML/MIN/1.73M2 — SIGNIFICANT CHANGE UP
EOSINOPHIL # BLD AUTO: 0.15 K/UL — SIGNIFICANT CHANGE UP (ref 0–0.5)
EOSINOPHIL NFR BLD AUTO: 1.4 % — SIGNIFICANT CHANGE UP (ref 0–6)
GAS PNL BLDV: SIGNIFICANT CHANGE UP MMOL/L (ref 136–145)
GLUCOSE BLDV-MCNC: 116 MG/DL — HIGH (ref 70–99)
GLUCOSE SERPL-MCNC: 118 MG/DL — HIGH (ref 70–99)
HCG SERPL-ACNC: SIGNIFICANT CHANGE UP MIU/ML
HCO3 BLDV-SCNC: 27 MMOL/L — SIGNIFICANT CHANGE UP (ref 22–29)
HCT VFR BLD CALC: 43.7 % — SIGNIFICANT CHANGE UP (ref 34.5–45)
HCT VFR BLDA CALC: SIGNIFICANT CHANGE UP % (ref 34.5–46.5)
HGB BLD CALC-MCNC: SIGNIFICANT CHANGE UP G/DL (ref 11.7–16.1)
HGB BLD-MCNC: 14.9 G/DL — SIGNIFICANT CHANGE UP (ref 11.5–15.5)
IANC: 7.58 K/UL — HIGH (ref 1.8–7.4)
IMM GRANULOCYTES NFR BLD AUTO: 0.4 % — SIGNIFICANT CHANGE UP (ref 0–0.9)
INR BLD: 1.02 RATIO — SIGNIFICANT CHANGE UP (ref 0.88–1.16)
LACTATE BLDV-MCNC: 2.6 MMOL/L — HIGH (ref 0.5–2)
LIDOCAIN IGE QN: >3000 U/L — SIGNIFICANT CHANGE UP (ref 7–60)
LYMPHOCYTES # BLD AUTO: 1.93 K/UL — SIGNIFICANT CHANGE UP (ref 1–3.3)
LYMPHOCYTES # BLD AUTO: 18.6 % — SIGNIFICANT CHANGE UP (ref 13–44)
MAGNESIUM SERPL-MCNC: 1.9 MG/DL — SIGNIFICANT CHANGE UP (ref 1.6–2.6)
MCHC RBC-ENTMCNC: 31.2 PG — SIGNIFICANT CHANGE UP (ref 27–34)
MCHC RBC-ENTMCNC: 34.1 GM/DL — SIGNIFICANT CHANGE UP (ref 32–36)
MCV RBC AUTO: 91.6 FL — SIGNIFICANT CHANGE UP (ref 80–100)
MONOCYTES # BLD AUTO: 0.64 K/UL — SIGNIFICANT CHANGE UP (ref 0–0.9)
MONOCYTES NFR BLD AUTO: 6.2 % — SIGNIFICANT CHANGE UP (ref 2–14)
NEUTROPHILS # BLD AUTO: 7.58 K/UL — HIGH (ref 1.8–7.4)
NEUTROPHILS NFR BLD AUTO: 73 % — SIGNIFICANT CHANGE UP (ref 43–77)
NRBC # BLD: 0 /100 WBCS — SIGNIFICANT CHANGE UP (ref 0–0)
NRBC # FLD: 0 K/UL — SIGNIFICANT CHANGE UP (ref 0–0)
PCO2 BLDV: 45 MMHG — SIGNIFICANT CHANGE UP (ref 39–52)
PH BLDV: 7.39 — SIGNIFICANT CHANGE UP (ref 7.32–7.43)
PHOSPHATE SERPL-MCNC: 3.9 MG/DL — SIGNIFICANT CHANGE UP (ref 2.5–4.5)
PLATELET # BLD AUTO: 213 K/UL — SIGNIFICANT CHANGE UP (ref 150–400)
PO2 BLDV: 40 MMHG — SIGNIFICANT CHANGE UP (ref 25–45)
POTASSIUM BLDV-SCNC: 3.8 MMOL/L — SIGNIFICANT CHANGE UP (ref 3.5–5.1)
POTASSIUM SERPL-MCNC: 4.1 MMOL/L — SIGNIFICANT CHANGE UP (ref 3.5–5.3)
POTASSIUM SERPL-SCNC: 4.1 MMOL/L — SIGNIFICANT CHANGE UP (ref 3.5–5.3)
PROT SERPL-MCNC: 6.6 G/DL — SIGNIFICANT CHANGE UP (ref 6–8.3)
PROTHROM AB SERPL-ACNC: 11.8 SEC — SIGNIFICANT CHANGE UP (ref 10.5–13.4)
RBC # BLD: 4.77 M/UL — SIGNIFICANT CHANGE UP (ref 3.8–5.2)
RBC # FLD: 12.2 % — SIGNIFICANT CHANGE UP (ref 10.3–14.5)
RH IG SCN BLD-IMP: POSITIVE — SIGNIFICANT CHANGE UP
SAO2 % BLDV: SIGNIFICANT CHANGE UP % (ref 67–88)
SODIUM SERPL-SCNC: 138 MMOL/L — SIGNIFICANT CHANGE UP (ref 135–145)
WBC # BLD: 10.38 K/UL — SIGNIFICANT CHANGE UP (ref 3.8–10.5)
WBC # FLD AUTO: 10.38 K/UL — SIGNIFICANT CHANGE UP (ref 3.8–10.5)

## 2023-07-25 PROCEDURE — 93010 ELECTROCARDIOGRAM REPORT: CPT

## 2023-07-25 PROCEDURE — 74018 RADEX ABDOMEN 1 VIEW: CPT | Mod: 26

## 2023-07-25 PROCEDURE — 99222 1ST HOSP IP/OBS MODERATE 55: CPT | Mod: GC

## 2023-07-25 PROCEDURE — G1004: CPT

## 2023-07-25 PROCEDURE — 74177 CT ABD & PELVIS W/CONTRAST: CPT | Mod: 26,ME

## 2023-07-25 PROCEDURE — 99285 EMERGENCY DEPT VISIT HI MDM: CPT

## 2023-07-25 RX ORDER — SODIUM CHLORIDE 9 MG/ML
1000 INJECTION INTRAMUSCULAR; INTRAVENOUS; SUBCUTANEOUS ONCE
Refills: 0 | Status: COMPLETED | OUTPATIENT
Start: 2023-07-25 | End: 2023-07-25

## 2023-07-25 RX ORDER — ACETAMINOPHEN 500 MG
1000 TABLET ORAL ONCE
Refills: 0 | Status: COMPLETED | OUTPATIENT
Start: 2023-07-25 | End: 2023-07-25

## 2023-07-25 RX ORDER — SODIUM CHLORIDE 9 MG/ML
1000 INJECTION, SOLUTION INTRAVENOUS ONCE
Refills: 0 | Status: DISCONTINUED | OUTPATIENT
Start: 2023-07-25 | End: 2023-07-25

## 2023-07-25 RX ORDER — ONDANSETRON 8 MG/1
4 TABLET, FILM COATED ORAL ONCE
Refills: 0 | Status: COMPLETED | OUTPATIENT
Start: 2023-07-25 | End: 2023-07-25

## 2023-07-25 RX ORDER — HYDROMORPHONE HYDROCHLORIDE 2 MG/ML
2 INJECTION INTRAMUSCULAR; INTRAVENOUS; SUBCUTANEOUS
Refills: 0 | Status: DISCONTINUED | OUTPATIENT
Start: 2023-07-25 | End: 2023-07-27

## 2023-07-25 RX ORDER — SODIUM CHLORIDE 9 MG/ML
1000 INJECTION, SOLUTION INTRAVENOUS
Refills: 0 | Status: DISCONTINUED | OUTPATIENT
Start: 2023-07-25 | End: 2023-07-25

## 2023-07-25 RX ORDER — HYDROMORPHONE HYDROCHLORIDE 2 MG/ML
2 INJECTION INTRAMUSCULAR; INTRAVENOUS; SUBCUTANEOUS ONCE
Refills: 0 | Status: DISCONTINUED | OUTPATIENT
Start: 2023-07-25 | End: 2023-07-25

## 2023-07-25 RX ORDER — HYDROMORPHONE HYDROCHLORIDE 2 MG/ML
1 INJECTION INTRAMUSCULAR; INTRAVENOUS; SUBCUTANEOUS ONCE
Refills: 0 | Status: DISCONTINUED | OUTPATIENT
Start: 2023-07-25 | End: 2023-07-25

## 2023-07-25 RX ORDER — LANOLIN ALCOHOL/MO/W.PET/CERES
3 CREAM (GRAM) TOPICAL AT BEDTIME
Refills: 0 | Status: DISCONTINUED | OUTPATIENT
Start: 2023-07-25 | End: 2023-08-04

## 2023-07-25 RX ORDER — HYDROMORPHONE HYDROCHLORIDE 2 MG/ML
1 INJECTION INTRAMUSCULAR; INTRAVENOUS; SUBCUTANEOUS
Refills: 0 | Status: DISCONTINUED | OUTPATIENT
Start: 2023-07-25 | End: 2023-07-27

## 2023-07-25 RX ORDER — ENOXAPARIN SODIUM 100 MG/ML
40 INJECTION SUBCUTANEOUS EVERY 24 HOURS
Refills: 0 | Status: DISCONTINUED | OUTPATIENT
Start: 2023-07-25 | End: 2023-07-25

## 2023-07-25 RX ORDER — SODIUM CHLORIDE 9 MG/ML
1000 INJECTION, SOLUTION INTRAVENOUS
Refills: 0 | Status: DISCONTINUED | OUTPATIENT
Start: 2023-07-25 | End: 2023-07-26

## 2023-07-25 RX ORDER — ACETAMINOPHEN 500 MG
650 TABLET ORAL EVERY 6 HOURS
Refills: 0 | Status: DISCONTINUED | OUTPATIENT
Start: 2023-07-25 | End: 2023-07-31

## 2023-07-25 RX ORDER — ONDANSETRON 8 MG/1
4 TABLET, FILM COATED ORAL EVERY 8 HOURS
Refills: 0 | Status: DISCONTINUED | OUTPATIENT
Start: 2023-07-25 | End: 2023-08-04

## 2023-07-25 RX ORDER — SODIUM CHLORIDE 9 MG/ML
1000 INJECTION, SOLUTION INTRAVENOUS ONCE
Refills: 0 | Status: COMPLETED | OUTPATIENT
Start: 2023-07-25 | End: 2023-07-25

## 2023-07-25 RX ADMIN — HYDROMORPHONE HYDROCHLORIDE 1 MILLIGRAM(S): 2 INJECTION INTRAMUSCULAR; INTRAVENOUS; SUBCUTANEOUS at 12:39

## 2023-07-25 RX ADMIN — Medication 400 MILLIGRAM(S): at 21:55

## 2023-07-25 RX ADMIN — HYDROMORPHONE HYDROCHLORIDE 2 MILLIGRAM(S): 2 INJECTION INTRAMUSCULAR; INTRAVENOUS; SUBCUTANEOUS at 13:46

## 2023-07-25 RX ADMIN — HYDROMORPHONE HYDROCHLORIDE 2 MILLIGRAM(S): 2 INJECTION INTRAMUSCULAR; INTRAVENOUS; SUBCUTANEOUS at 23:09

## 2023-07-25 RX ADMIN — SODIUM CHLORIDE 1000 MILLILITER(S): 9 INJECTION INTRAMUSCULAR; INTRAVENOUS; SUBCUTANEOUS at 09:20

## 2023-07-25 RX ADMIN — SODIUM CHLORIDE 250 MILLILITER(S): 9 INJECTION, SOLUTION INTRAVENOUS at 13:51

## 2023-07-25 RX ADMIN — SODIUM CHLORIDE 1000 MILLILITER(S): 9 INJECTION, SOLUTION INTRAVENOUS at 12:47

## 2023-07-25 RX ADMIN — SODIUM CHLORIDE 100 MILLILITER(S): 9 INJECTION, SOLUTION INTRAVENOUS at 21:57

## 2023-07-25 RX ADMIN — HYDROMORPHONE HYDROCHLORIDE 1 MILLIGRAM(S): 2 INJECTION INTRAMUSCULAR; INTRAVENOUS; SUBCUTANEOUS at 12:10

## 2023-07-25 RX ADMIN — HYDROMORPHONE HYDROCHLORIDE 2 MILLIGRAM(S): 2 INJECTION INTRAMUSCULAR; INTRAVENOUS; SUBCUTANEOUS at 20:00

## 2023-07-25 RX ADMIN — HYDROMORPHONE HYDROCHLORIDE 2 MILLIGRAM(S): 2 INJECTION INTRAMUSCULAR; INTRAVENOUS; SUBCUTANEOUS at 23:39

## 2023-07-25 RX ADMIN — HYDROMORPHONE HYDROCHLORIDE 2 MILLIGRAM(S): 2 INJECTION INTRAMUSCULAR; INTRAVENOUS; SUBCUTANEOUS at 16:22

## 2023-07-25 RX ADMIN — SODIUM CHLORIDE 1000 MILLILITER(S): 9 INJECTION INTRAMUSCULAR; INTRAVENOUS; SUBCUTANEOUS at 11:39

## 2023-07-25 RX ADMIN — HYDROMORPHONE HYDROCHLORIDE 1 MILLIGRAM(S): 2 INJECTION INTRAMUSCULAR; INTRAVENOUS; SUBCUTANEOUS at 09:22

## 2023-07-25 RX ADMIN — SODIUM CHLORIDE 100 MILLILITER(S): 9 INJECTION, SOLUTION INTRAVENOUS at 15:32

## 2023-07-25 RX ADMIN — HYDROMORPHONE HYDROCHLORIDE 2 MILLIGRAM(S): 2 INJECTION INTRAMUSCULAR; INTRAVENOUS; SUBCUTANEOUS at 12:46

## 2023-07-25 RX ADMIN — HYDROMORPHONE HYDROCHLORIDE 2 MILLIGRAM(S): 2 INJECTION INTRAMUSCULAR; INTRAVENOUS; SUBCUTANEOUS at 16:41

## 2023-07-25 RX ADMIN — ONDANSETRON 4 MILLIGRAM(S): 8 TABLET, FILM COATED ORAL at 18:39

## 2023-07-25 RX ADMIN — HYDROMORPHONE HYDROCHLORIDE 1 MILLIGRAM(S): 2 INJECTION INTRAMUSCULAR; INTRAVENOUS; SUBCUTANEOUS at 11:39

## 2023-07-25 RX ADMIN — HYDROMORPHONE HYDROCHLORIDE 1 MILLIGRAM(S): 2 INJECTION INTRAMUSCULAR; INTRAVENOUS; SUBCUTANEOUS at 11:10

## 2023-07-25 RX ADMIN — ONDANSETRON 4 MILLIGRAM(S): 8 TABLET, FILM COATED ORAL at 09:21

## 2023-07-25 RX ADMIN — Medication 1000 MILLIGRAM(S): at 22:25

## 2023-07-25 RX ADMIN — HYDROMORPHONE HYDROCHLORIDE 2 MILLIGRAM(S): 2 INJECTION INTRAMUSCULAR; INTRAVENOUS; SUBCUTANEOUS at 19:26

## 2023-07-25 NOTE — CHART NOTE - NSCHARTNOTEFT_GEN_A_CORE
This report was requested by: Chula Barriga | Reference #: [id number]    Prescriptions Dispensed in [place]  There are no results for the search terms that you entered.    Prescriptions Dispensed in [place]  This state's  has indicated that you do not have permission to perform this search.    Prescriptions Dispensed in [place]  This state's  has indicated that you do not have permission to perform this search.    Prescriptions Dispensed in [place]  There are no results for the search terms that you entered.    Prescriptions Dispensed in [place]  There are no results for the search terms that you entered.    Prescriptions Dispensed in [place]  There are no results for the search terms that you entered.    Prescriptions Dispensed in [place]  There are no results for the search terms that you entered.    Prescriptions Dispensed in [place]  There are no results for the search terms that you entered.    Prescriptions Dispensed in [place]  There are no results for the search terms that you entered.    Prescriptions Dispensed in [facility]  This state's  has indicated that you do not have permission to perform this search.    Prescriptions Dispensed in [place]  There are no results for the search terms that you entered.    Prescriptions Dispensed in [place]  There are no results for the search terms that you entered.    Prescriptions Dispensed in [place]  There are no results for the search terms that you entered.    Prescriptions Dispensed in [place]  There are no results for the search terms that you entered.    Prescriptions Dispensed in [place]  There are no results for the search terms that you entered.    Prescriptions Dispensed in [place]  There are no results for the search terms that you entered.    Prescriptions Dispensed in [place]  There are no results for the search terms that you entered.    Prescriptions Dispensed in [place]  There are no results for the search terms that you entered.    Prescriptions Dispensed in [place]  There are no results for the search terms that you entered.    Prescriptions Dispensed in [facility]  There are no results for the search terms that you entered.    Prescriptions Dispensed in [place]  There are no results for the search terms that you entered.    Prescriptions Dispensed in [place]  There are no results for the search terms that you entered.    Prescriptions Dispensed in [place]  There are no results for the search terms that you entered.    Prescriptions Dispensed in [place]  There are no results for the search terms that you entered.    Prescriptions Dispensed in [place]  There are no results for the search terms that you entered.    Prescriptions Dispensed in [place]  There are no results for the search terms that you entered.    Prescriptions Dispensed in [place]  There are no results for the search terms that you entered.    Prescriptions Dispensed in [place]  There are no results for the search terms that you entered.    Prescriptions Dispensed in [facility]  This state's  has indicated that you do not have permission to perform this search.    Prescriptions Dispensed in [place]  There are no results for the search terms that you entered.    Prescriptions Dispensed in [place]  There are no results for the search terms that you entered.    Prescriptions Dispensed in [place]  There are no results for the search terms that you entered.    Prescriptions Dispensed in [place]  There are no results for the search terms that you entered.    Prescriptions Dispensed in [place]  This state's  has indicated that you do not have permission to perform this search.    Prescriptions Dispensed in [place]  There are no results for the search terms that you entered.    Prescriptions Dispensed in [place]  There are no results for the search terms that you entered.    * - Drugs marked with an asterisk are compound drugs. If the compound drug is made up of more than one controlled substance, then each controlled substance will be a separate row in the table.       †Click the ‘Report Suspicious Activity’ button to report information related to controlled substance suspicious activity to the [place] [employer].    ††Click the ‘Send Questions/Comments’ button to send questions about this report to the [place] [employer], or call 1-640.106.2695.    †††Click the ‘Substance Use Disorder Treatment’ button to go to the Office of Addiction Services and Supports website, www.[place].gov or call 1-775.479.1602.    © 2017 [facility] of Health -[place] [employer]otic Njhbfntzwpu95/25/2023 16:04  .

## 2023-07-25 NOTE — PATIENT PROFILE ADULT - FUNCTIONAL ASSESSMENT - BASIC MOBILITY 3.
"Carlos is a 73 year old who is being evaluated via a billable telephone visit.      What phone number would you like to be contacted at? [phone]  How would you like to obtain your AVS? Mail a copy  Phone call duration: 8 minutes      Carlos Ricks complains of   Chief Complaint   Patient presents with     Follow Up     BPH w/urinary retention        Assessment/Plan:  73 year old male, s/p HoLEP, doing well. Very minor residual leakage, which has improved over time. Frequency no longer a concern. Feels he is voiding well.  -Follow up with me in one year with a repeat PSA beforehand, or sooner if needed.     Olamide Page, CNP  Department of Urology      Subjective:   73 year old male with a history of BPH, now s/p HoLEP with Dr. Carbajal on 6/2/21, who presents today for virtual follow up. He saw Dr. Carbajal for a post-op visit on 7/13, and reported bothersome urinary frequency. Uroflow completed the day prior showing a normal stream and good bladder emptying. He was started on an anticholinergic trial with oxybutynin 5 mg daily. PSA also obtained to establish new baseline and was 0.32.     Today, he states that he did not start the oxybutynin due to the potential side effect of constipation. He did not want to add a symptom to worry about. He feels his voiding symptoms have \"calmed down\" and everything seems to be working well now. He endorses a \"tiny, tiny bit\" of leakage, which is not an issue for him. This has continued to improve over time. He is back to his regular exercise regimen and feels everything is going well. He has no concerns today.         "
4 = No assist / stand by assistance

## 2023-07-25 NOTE — PATIENT PROFILE ADULT - FUNCTIONAL ASSESSMENT - DAILY ACTIVITY SCORE.
[facility] Office Note  Chief Complaint:   Patient presents with: Follow - Up: Needs blood drawn today due to elevated HgB and liver. HPI:   This is a 29year old female coming in for follow up on labs and chest pain.    Has Former Smoker        Packs/day: 0.50        Years: 9.00        Pack years: 4.5        Types: Cigarettes      Smokeless tobacco: Never Used      Tobacco comment: quit 11/07    Vaping Use      Vaping Use: Every day        Substances: Nicotine, THC        Dev junctions and swelling of the L side costochondral junctions also appreciated and patient appreciates the similar pain. Tenderness also noted along the intercostal muscles.    LUNGS: CTAB, no RRW, good respiratory effort and not in any discomfort when [employer] Patient is in agreement with plan. If the patient has any questions, the patient knows how to contact us at any time. This billing was spent on reviewing prior hospital / clinic noted, labs, medications, other tests and medical decision making.   Approp 24

## 2023-07-25 NOTE — CONSULT NOTE ADULT - ATTENDING COMMENTS
Patient seen and examined with the GI fellow. I agree with the above assessment and plan. Thank you for allowing us to care for your patient.    Pt with post-ERCP pancreatitis.  Plan for aggressive fluid management and pain control. Patient seen and examined with the GI fellow. I agree with the above assessment and plan. Thank you for allowing us to care for your patient.    Pt with post-ERCP pancreatitis.  Plan for aggressive fluid management and pain control. I discussed the care with her and her .

## 2023-07-25 NOTE — DISCHARGE NOTE PROVIDER - HOSPITAL COURSE
58 year old female with history of GERD, hypertension, s/p cholecystectomy in 2013 who presents with severe sharp 10/10 generalized epigastric pain radiating to her back upon waking on 07/25 s/p ERCP on 07/24. Lipase of >3000, CT abdomen findings of severe pancreatitis with no necrosis, migration of pancreatic stent to the terminal ileum/ascending colon. Started on supportive measures including IV fluid hydration and pain management.      58 year old female with history of GERD, hypertension, s/p cholecystectomy in 2013 who presents with severe sharp 10/10 generalized epigastric pain radiating to her back upon waking on 07/25 s/p ERCP on 07/24. Lipase of >3000, CT abdomen findings of severe pancreatitis with no necrosis, migration of pancreatic stent to the terminal ileum/ascending colon. Started on supportive measures including IV fluid hydration and pain management. On 07/26, noted to have suprapubic tenderness with urine appearing dark. UA positive for nitrates, leuk esterase, and bacteria. Started on course of IV Ceftriaxone. Additionally, tachycardia of 130s and O2 sat of 92. O2 stat improved upon patient sitting up and moving. Vital abnormalities likely a result of pain/splinting.      58 year old female with history of GERD, hypertension, s/p cholecystectomy in 2013 who presents with severe sharp 10/10 generalized epigastric pain radiating to her back upon waking on 07/25 s/p ERCP on 07/24. Lipase of >3000, CT abdomen findings of severe pancreatitis with no necrosis, migration of pancreatic stent to the terminal ileum/ascending colon. Started on supportive measures including IV fluid hydration and pain management. On 07/26, noted to have suprapubic tenderness with urine appearing dark. UA positive for nitrates, leuk esterase, and bacteria. Started on course of IV Ceftriaxone. Additionally, tachycardia of 130s and O2 sat of 92. O2 stat improved upon patient sitting up and moving. Vital abnormalities likely a result of pain/splinting. Course complicated by constipation likely secondary to Dilaudid, s/p enema patient had loose bowel movement. After      58 year old female with history of GERD, hypertension, s/p cholecystectomy in 2013 who presents with severe sharp 10/10 generalized epigastric pain radiating to her back upon waking on 07/25 s/p ERCP on 07/24. Lipase of >3000, CT abdomen findings of severe pancreatitis with no necrosis, migration of pancreatic stent to the terminal ileum/ascending colon. Started on supportive measures including IV fluid hydration and pain management. On 07/26, noted to have suprapubic tenderness with urine appearing dark. UA positive for nitrates, leuk esterase, and bacteria. Started on course of IV Ceftriaxone. Additionally, tachycardia of 130s and O2 sat of 92. O2 stat improved upon patient sitting up and moving. Vital abnormalities likely a result of pain/splinting. Course complicated by constipation likely secondary to Dilaudid, s/p enema patient had loose bowel movement. After bowel movement, patient noted improvement in volume of urine output with less frequency. On 07/28; d/c Ceftriaxone due to low suspicion for UTI and that previous symptoms likely a result of constipation. However, patient had findings of bandemia with persistent pain. Empiric IV Zosyn started, CT abdomen/pelvis urgent ordered. Overnight patient found to have wheezing on exam with coughing and clear sputum. Started on PRN Duonebs. CXR shows bibasilar infiltrates with small pleural reactions. On exam on 07/29, wheezing persisted with edematous lower extremity. Presentation likely due to hypervolemia from consistent IVF. Fluids d/c on 07/29.      58 year old female with history of GERD, hypertension, s/p cholecystectomy in 2013 who presents with severe sharp 10/10 generalized epigastric pain radiating to her back upon waking on 07/25 s/p ERCP on 07/24. Lipase of >3000, CT abdomen findings of severe pancreatitis with no necrosis, migration of pancreatic stent to the terminal ileum/ascending colon. Started on supportive measures including IV fluid hydration and pain management. On 07/26, noted to have suprapubic tenderness with urine appearing dark. UA positive for nitrates, leuk esterase, and bacteria. Started on course of IV Ceftriaxone. Additionally, tachycardia of 130s and O2 sat of 92. O2 stat improved upon patient sitting up and moving. Vital abnormalities likely a result of pain/splinting. Course complicated by constipation likely secondary to Dilaudid, s/p enema patient had loose bowel movement. After bowel movement, patient noted improvement in volume of urine output with less frequency. On 07/28; d/c Ceftriaxone due to low suspicion for UTI and that previous symptoms likely a result of constipation. However, patient had findings of bandemia with persistent pain. Empiric IV Zosyn started, CT abdomen/pelvis urgent ordered. Overnight patient found to have wheezing on exam with coughing and clear sputum. Started on PRN Duonebs. CXR shows bibasilar infiltrates with small pleural reactions. On exam on 07/29, wheezing persisted with edematous lower extremity. Presentation likely due to hypervolemia from consistent IVF. Fluids d/c on 07/29. Repeat CT abdomen/pelvis showed hypoechoic enhancement of the anterior pancreatic head, necrotic pancreatitis. General surgery and GI made aware. No surgical intervention planned at this time.      58 year old female with history of GERD, hypertension, s/p cholecystectomy in 2013 who presents with severe sharp 10/10 generalized epigastric pain radiating to her back, CT abdomen findings of severe pancreatitis with no necrosis, with migration of pancreatic stent to the terminal ileum/ascending colon. Started on supportive measures including IV fluid hydration and pain management. Course complicated by constipation, likely 2/2 to opiate pain medication. This is resolved after bowel regimen. Course further complicated by wheezing, CXR showed bibasilar infiltrates with small pleural reactions. Also have bilateral pedal edema. This is suspected to be secondary to fluid overload due to aggressive fluid hydration. These improved after fluid was d/c.     Due to continued pain, repeat CT scan was performed, read suspicion of necrotizing pancreatitis. GI and surg disagree with radiology read, recs no antibiotic and no surgical intervention at this time. Pt was able to wean off IV medication and transition to oral pain medication, and was therefore discharge home with outpatient GI follow up.     Of note. Pt also has an episode of UTI during hospital stay, treated with ceftriaxone. 58 year old female with history of GERD, hypertension, s/p cholecystectomy in 2013 who presents with severe sharp 10/10 generalized epigastric pain radiating to her back, CT abdomen findings of severe pancreatitis with no necrosis, with migration of pancreatic stent to the terminal ileum/ascending colon. Started on supportive measures including IV fluid hydration and pain management. Course complicated by constipation, likely 2/2 to opiate pain medication. This is resolved after bowel regimen. Course further complicated by wheezing, CXR showed bibasilar infiltrates with small pleural reactions. Also have bilateral pedal edema. This is suspected to be secondary to fluid overload due to aggressive fluid hydration. These improved after fluid was d/c.     Due to continued pain, repeat CT scan was performed, read suspicion of necrotizing pancreatitis. GI and surg disagree with radiology read, recs no antibiotic and no surgical intervention at this time. Pt was able to wean off IV medication and transition to oral pain medication, and was therefore discharge home with outpatient GI follow up.     Of note. Pt also has an episode of UTI during hospital stay, treated with ceftriaxone.     Patient is medically cleared for discharge on 8/4/2023 per attending Dr. Kapoor.

## 2023-07-25 NOTE — ED ADULT NURSE NOTE - NSFALLUNIVINTERV_ED_ALL_ED
Bed/Stretcher in lowest position, wheels locked, appropriate side rails in place/Call bell, personal items and telephone in reach/Instruct patient to call for assistance before getting out of bed/chair/stretcher/Non-slip footwear applied when patient is off stretcher/[place] to call system/Physically safe environment - no spills, clutter or unnecessary equipment/Purposeful proactive rounding/Room/bathroom lighting operational, light cord in reach

## 2023-07-25 NOTE — ED PROVIDER NOTE - PHYSICAL EXAMINATION
.Please place or confirm orders for upcoming lab appointment on 2/4/2021.  Thank You,  Kelin  
Please review lab orders sign and close encounter. Amy Recinos MA/SERGIO    BP appt 2/9/21  
PHYSICAL EXAM:    GENERAL: moderate distress and tearful  HEENT:  Atraumatic, normocephalic  CHEST/LUNG: Chest rise equal bilaterally  HEART: Regular rate and rhythm  ABDOMEN: Soft, tender throughout abdomen but most in epigastric region  EXTREMITIES:  Extremities warm, 2+ DP pulses  PSYCH: A&Ox3  SKIN: No obvious rashes or lesions  MSK: No obvious deformity  NEUROLOGY: Moving all extremities spontaneously

## 2023-07-25 NOTE — ED PROVIDER NOTE - PROGRESS NOTE DETAILS
Dr. Borges: Dr. Prajapati evaluated pt at bedside, recommends 4-5 L IVF (recommends switching to LR) and medical admission for pain control and GI to follow Dr. Borges: I spoke to Dr. Nicole, hospitalist, accepted admission

## 2023-07-25 NOTE — H&P ADULT - PROBLEM SELECTOR PLAN 1
- 1 day s/p ERCP, likely induced to recent procedure  - Dr. Park, GI, is following. Recs appreciated  - Follow up infectious workup; blood cultures, UA, urine culture  - Trend CBC, BMP, coags.   - Zofran 4 mg q 8 hours PRN for nausea   - Pain management:   Mild pain: Tylenol 650 mg PO q 6hours as needed   Moderate pain:   Severe pain:   - IVF hydration; - 1 day s/p ERCP, likely induced to recent procedure  - Dr. Park, GI, is following. Recs appreciated  - Follow up infectious workup; blood cultures, UA, urine culture  - Trend CBC, BMP, coags.   - Zofran 4 mg q 8 hours PRN for nausea   - Pain management:   Mild pain: Tylenol 650 mg PO PRN q 6 hours    Moderate pain: Dilaudid 1 mg IV PRN q 3 hours    Severe pain: Dilaudid 2 mg IV PRN q 3 hours   - IVF hydration; NS at 100 cc/hour standing

## 2023-07-25 NOTE — H&P ADULT - TIME BILLING
Patient encounter, including chart review, medication review, patient interview, ordering labs and medications, interpreting labs and imaging results, and coordination of care with consultants

## 2023-07-25 NOTE — DISCHARGE NOTE PROVIDER - NSDCCPTREATMENT_GEN_ALL_CORE_FT
PRINCIPAL PROCEDURE  Procedure: CT abdomen  Findings and Treatment: CONTRAST/COMPLICATIONS:  IV Contrast: Omnipaque 350  90 cc administered   10 cc discarded  Oral Contrast: NONE  Complications: None reported at time of study completion  PROCEDURE:  CT of the Abdomen and Pelvis was performed.  Sagittal and coronal reformats were performed.  FINDINGS:  LOWER CHEST: Bibasilar subsegmental atelectasis. Punctate calcified   granuloma in the right middle lobe.  LIVER: Within normal limits.  BILE DUCTS: Mild intrahepatic biliary ductal dilatation and common bile   duct dilatation up to 7 mm, likely secondary to postcholecystectomy state.  GALLBLADDER: Cholecystectomy.  SPLEEN: Nonspecific innumerable hypoattenuating foci in the spleen,   likely benign.  PANCREAS: Extensive peripancreatic edema and fluid. No loculated fluid   collections. Diffuse edematous pancreatic parenchyma without focal   hypoattenuation/hypoenhancement to suggest necrosis. No pancreatic ductal   dilatation.  ADRENALS: Within normal limits.  KIDNEYS/URETERS: Bilateral renal cysts. No hydronephrosis.  BLADDER: Within normal limits.  REPRODUCTIVE ORGANS: Uterus and adnexa within normal limits.  BOWEL: Mild fluid distention of the distal esophagus. The pancreatic   stent noted in the terminal ileum/ascending colon. No bowel obstruction.   Small hiatal hernia. Appendix is not visualized.  PERITONEUM: Small ascites. No pneumoperitoneum.  VESSELS: Within normal limits.  RETROPERITONEUM/LYMPH NODES: No lymphadenopathy.  ABDOMINAL WALL: Within normal limits.  BONES: Degenerative changes.  IMPRESSION:  Severe acute interstitial edematous pancreatitis with extensive   peripancreatic edema/fluid. No CT evidence of necrosis.  The pancreatic stent migration, which is in the terminal ileum/ascending  colon. No evidence of bowel obstruction.  Further evaluation recommended.

## 2023-07-25 NOTE — H&P ADULT - ATTENDING COMMENTS
58 year old female with a history of GERD and HTN presenting with severe epigastric pain and vomiting this AM. had ERCP done yesterday with PD stent placed, procedure went well. woke up this morning with severe pain and 1 episode of vomiting.    patient seen and examined at bedside. patient's spouse at bedside. reports ongoing epigastric pain but pain improves from a 10/10 to a 4/10 with Dilaudid. no further episodes of nausea/vomiting. does not want to try eating/drinking anything for now until the pain is improved.    #Post-ERCP pancreatitis  -c/w continuous IVF  -pain control with Dilaudid 2mg q3h PRN for severe pain; 1mg q3h PRN for moderate pain  -NPO for now, advance diet as tolerated  -Zofran PRN for nausea/vomiting  -bowel regimen PRN while on opioids  -CT abdomen/pelvis reviewed with findings of "severe acute interstitial edematous pancreatitis with extensive peripancreatic edema/fluid" and PD stent now migrated to the terminal ileum/ascending colon  -lipase >3000 on admission  -no leukocytosis, afebrile, no signs of necrosis on CT  -appreciate GI recommendations    d/w resident Dr. Fong

## 2023-07-25 NOTE — DISCHARGE NOTE PROVIDER - ATTENDING DISCHARGE PHYSICAL EXAMINATION:
Patient seen and examined at bedside on day of discharge (8/4/23). Patient reports that she feels better. States that her abdominal pain continues to improve, and is currently well controlled on ibuprofen. Also reports that her diarrhea is improving and becoming less frequent. Reports 2-3 BMs overnight. Patient denies any fevers or chills and has been tolerating regular diet. Patient eager to go home. VSS.    PHYSICAL EXAM:  GENERAL: NAD, sitting in recliner  HEENT: NC/AT, EOMI  NECK: Supple, No JVD  CHEST/LUNG: CTAB, no increased WOB  HEART: RRR, no m/r/g  ABDOMEN: soft, NT, ND, BS+  EXTREMITIES:  2+ peripheral pulses, no clubbing, no edema  NERVOUS SYSTEM: A&Ox3, no focal deficits  MSK: FROM all 4 extremities, full and equal strength    GI evaluation noted, stable for discharge. Patient currently stable medically. GI PCR negative, C. Diff testing ordered but pending. However as patient without fever or leukocytosis and as her diarrhea is spontaneously resolving, very low suspicion for C. diff infection at this time. Patient is otherwise medically optimized for discharge home with GI follow up.

## 2023-07-25 NOTE — ED PROVIDER NOTE - OBJECTIVE STATEMENT
57 yo F with PMH notable for GERD and HTN presenting with severe epigastric abdominal pain that began this morning s/p ERCP yesterday in the setting of recently elevated LFTs and concern for biliary/pancreatic obstruction. Had similar pain several weeks ago which prompted the ERCP. Pain does not radiate to back but feels pain in entire abdomen, mostly in epigastric region. Feels nauseated and is vomiting. No diarrhea, fever, or chills. Last ate yesterday around 10PM and was feeling fine after the procedure. 59 yo F with PMH notable for GERD and HTN presenting with severe epigastric abdominal pain that began this morning s/p ERCP yesterday in the setting of recently elevated LFTs and concern for biliary/pancreatic obstruction. Had similar pain several weeks ago which prompted the ERCP. Pain does not radiate to back but feels pain in entire abdomen, mostly in epigastric region. Feels nauseated and is vomiting. No diarrhea, fever, or chills. Last ate yesterday around 10PM and was feeling fine after the procedure. Last BM was yesterday.

## 2023-07-25 NOTE — ED ADULT TRIAGE NOTE - CHIEF COMPLAINT QUOTE
c/o sever upper abd pain onset this morning reports had ERCP procedure yesterday, denies N.v Hx of HTN, pt si moaning in pain in triage.

## 2023-07-25 NOTE — H&P ADULT - NSHPPHYSICALEXAM_GEN_ALL_CORE
General: Patient is alert, awake, and in moderate distress.  HEENT: Dry mucous membranes. Sclera anicteric.   Cardiovascular: S1S2 intact. No murmurs, gallops, rubs.   Pulm: Lungs clear to auscultation. No increased work of breathing.  Abdomen: Soft.  Diffuse abdominal tenderness. Minimal bowel sounds.   Extremities: No edema to the extremities.

## 2023-07-25 NOTE — DISCHARGE NOTE PROVIDER - NSDCMRMEDTOKEN_GEN_ALL_CORE_FT
amLODIPine 5 mg oral tablet: 1 tab(s) orally once a day  omeprazole 40 mg oral delayed release capsule: 1 cap(s) orally once a day  Pepcid 40 mg oral tablet: 1 tab(s) orally once a day (at bedtime)   amLODIPine 5 mg oral tablet: 1 tab(s) orally once a day  ibuprofen 400 mg oral tablet: 1 tab(s) orally 3 times a day  omeprazole 40 mg oral delayed release capsule: 1 cap(s) orally once a day  Pepcid 40 mg oral tablet: 1 tab(s) orally once a day (at bedtime)

## 2023-07-25 NOTE — H&P ADULT - HISTORY OF PRESENT ILLNESS
58 year old female with history of GERD, hypertension, s/p cholecystectomy in 2013 who presents with severe 10/10 generalized epigastric pain radiating to her back upon waking this morning. Additionally reports nausea and 1 episode of bilious emesis. Was seen in the ED on 06/29/2023 fro similar presentation of waking up with sever abdominal pain.     Patient has history of intermittent abdominal pain and elevated liver chemistries, eventually prompting EGD/EUS/ERCP on 7/24/2023 that revealed 2cm hiatal hernia, copious CBD sludge, and CBD dilatation to ampulla, stenotic papilla, 5F x 6cm single pigtail PD stent, biliary sphincterotomy, and sludge/small fragments removed using balloon catheter.  The following day, patient presents with severe, sharp epigastric pain.  No radiation to back.  Otherwise, patient denies fevers, chills, weight loss, dysphagia, odynophagia, early satiety, diarrhea, melena, hematemesis, hematochezia. 58 year old female with history of GERD, hypertension, s/p cholecystectomy in 2013 who presents with severe 10/10 generalized epigastric pain radiating to her back upon waking this morning. Additionally reports nausea and 1 episode of bilious emesis. Was seen in the ED on 06/29/2023 for similar presentation of waking up with severe abdominal pain.     Patient has history of intermittent abdominal pain and elevated liver chemistries, eventually prompting EGD/EUS/ERCP on 7/24/2023 that revealed 2cm hiatal hernia, copious CBD sludge, and CBD dilatation to ampulla, stenotic papilla, 5F x 6cm single pigtail PD stent, biliary sphincterotomy, and sludge/small fragments removed using balloon catheter.  The following day, patient presents with severe, sharp epigastric pain.  No radiation to back.  Otherwise, patient denies fevers, chills, weight loss, dysphagia, odynophagia, early satiety, diarrhea, melena, hematemesis, hematochezia. 58 year old female with history of GERD, hypertension, s/p cholecystectomy in 2013 who presents with severe sharp 10/10 generalized epigastric pain radiating to her back upon waking this morning. Additionally reports nausea and 1 episode of bilious emesis. Was seen in the ED on 06/29/2023 for similar presentation of waking up with severe abdominal pain. At that time in the ED she was found to have elevated LFT and Tbili. Had MRI and CT scan which did not show any CBD stone but given the acute symptoms and elevated LFT's at time she is recommended to have EUS +/- ERCP for further evaluation to r/o stone/sludge. EGD/EUS/ERCP with Dr. Park GI, on 7/24/2023 (yesterday) that revealed 2 cm hiatal hernia, copious CBD sludge, and CBD dilatation to ampulla, stenotic papilla, 5F x 6cm single pigtail PD stent, biliary sphincterotomy, and sludge/small fragments removed using balloon catheter.  Patient tolerated procedure well with no symptoms. Symptoms began upon waking this morning. Last BM yesterday. Last meal last night. Has not been able to eat due to immense pain today. Otherwise, patient denies fevers, chills, weight loss, dysphagia, odynophagia, early satiety, diarrhea, melena, hematemesis, hematochezia.    In the ED patient received 3L bolus of (2L NS and 1L LR), Dilaudid 1 mg x3, Dilaudid 2 mg x1, and Zofran 4 mg. Currently on LR at 250cc/hour for 12 hours. Lipase of >3000, CT abdomen findings of severe pancreatitis with no necrosis, migration of pancreatic stent to the terminal ileum/ascending colon.

## 2023-07-25 NOTE — ED PROVIDER NOTE - ATTENDING CONTRIBUTION TO CARE
Dr. Borges: This is a 58-year-old female with GERD and hypertension, 1 day status post ERCP in the setting of elevated LFTs (resolved), in the emergency department with severe generalized but mainly epigastric abdominal pain beginning a few hours before presentation.  Patient states that pain is located mostly in her epigastric region in her back, is associated with nausea but no vomiting.  She has not had any fevers, diarrhea, urinary complaints, other complaints.  On exam pt uncomfortable appearing, in moderate distress, heart RRR, lungs CTAB, abd generally TTP with focus of pain in epigastrium, without rebound or guarding, extremities without swelling, strength 5/5 in all extremities and skin without rash. Dr. Borges: I cared for this patient with a medical student.  The medical student documented in this chart as per guidelines.  I have personally seen and examined this patient and I have fully participated in their care.  I have reviewed all pertinent clinical information, including the history, physical exam, plan and medical student's documentation, and agree except as noted.  I have edited the medical student's note as I felt medically appropriate.  See above chart documentation for details.     Dr. Borges: This is a 58-year-old female with GERD and hypertension, 1 day status post ERCP in the setting of elevated LFTs (resolved), in the emergency department with severe generalized but mainly epigastric abdominal pain beginning a few hours before presentation.  Patient states that pain is located mostly in her epigastric region in her back, is associated with nausea but no vomiting.  She has not had any fevers, diarrhea, urinary complaints, other complaints.  On exam pt uncomfortable appearing, in moderate distress, heart RRR, lungs CTAB, abd generally TTP with focus of pain in epigastrium, without rebound or guarding, extremities without swelling, strength 5/5 in all extremities and skin without rash.

## 2023-07-25 NOTE — H&P ADULT - NSHPLABSRESULTS_GEN_ALL_CORE
LABS:                         14.9   10.38 )-----------( 213      ( 25 Jul 2023 09:41 )             43.7     07-25    138  |  103  |  10  ----------------------------<  118<H>  4.1   |  19<L>  |  0.71    Ca    9.2      25 Jul 2023 09:41  Phos  3.9     07-25  Mg     1.90     07-25    TPro  6.6  /  Alb  3.9  /  TBili  0.6  /  DBili  x   /  AST  21  /  ALT  24  /  AlkPhos  68  07-25    PT/INR - ( 25 Jul 2023 09:41 )   PT: 11.8 sec;   INR: 1.02 ratio         PTT - ( 25 Jul 2023 09:41 )  PTT:24.9 sec  Urinalysis Basic - ( 25 Jul 2023 09:41 )    Color: x / Appearance: x / SG: x / pH: x  Gluc: 118 mg/dL / Ketone: x  / Bili: x / Urobili: x   Blood: x / Protein: x / Nitrite: x   Leuk Esterase: x / RBC: x / WBC x   Sq Epi: x / Non Sq Epi: x / Bacteria: x            RADIOLOGY, EKG & ADDITIONAL TESTS: Reviewed.     EKG: NSR at 70 BPM, normal axis, no acute ST-T changes, QTc: 429    CT Abdomen/Pelvis:  PROCEDURE:  CT of the Abdomen and Pelvis was performed.  Sagittal and coronal reformats were performed.    FINDINGS:  LOWER CHEST: Bibasilar subsegmental atelectasis. Punctate calcified   granuloma in the right middle lobe.    LIVER: Within normal limits.  BILE DUCTS: Mild intrahepatic biliary ductal dilatation and common bile   duct dilatation up to 7 mm, likely secondary to postcholecystectomy state.  GALLBLADDER: Cholecystectomy.  SPLEEN: Nonspecific innumerable hypoattenuating foci in the spleen,   likely benign.  PANCREAS: Extensive peripancreatic edema and fluid. No loculated fluid   collections. Diffuse edematous pancreatic parenchyma without focal   hypoattenuation/hypoenhancement to suggest necrosis. No pancreatic ductal   dilatation.  ADRENALS: Within normal limits.  KIDNEYS/URETERS: Bilateral renal cysts. No hydronephrosis.    BLADDER: Within normal limits.  REPRODUCTIVE ORGANS: Uterus and adnexa within normal limits.    BOWEL: Mild fluid distention of the distal esophagus. The pancreatic   stent noted in the terminal ileum/ascending colon. No bowel obstruction.   Small hiatal hernia. Appendix is not visualized.  PERITONEUM: Small ascites. No pneumoperitoneum.  VESSELS: Within normal limits.  RETROPERITONEUM/LYMPH NODES: No lymphadenopathy.  ABDOMINAL WALL: Within normal limits.  BONES: Degenerative changes.    IMPRESSION:  Severe acute interstitial edematous pancreatitis with extensive   peripancreatic edema/fluid. No CT evidence of necrosis.  The pancreatic stent migration, which is in the terminal ileum/ascending  colon. No evidence of bowel obstruction.  Further evaluation recommended.    --- End of Report ---    XR Abdomen: LABS:                         14.9   10.38 )-----------( 213      ( 25 Jul 2023 09:41 )             43.7     07-25    138  |  103  |  10  ----------------------------<  118<H>  4.1   |  19<L>  |  0.71    Ca    9.2      25 Jul 2023 09:41  Phos  3.9     07-25  Mg     1.90     07-25    TPro  6.6  /  Alb  3.9  /  TBili  0.6  /  DBili  x   /  AST  21  /  ALT  24  /  AlkPhos  68  07-25    PT/INR - ( 25 Jul 2023 09:41 )   PT: 11.8 sec;   INR: 1.02 ratio         PTT - ( 25 Jul 2023 09:41 )  PTT:24.9 sec  Urinalysis Basic - ( 25 Jul 2023 09:41 )    Color: x / Appearance: x / SG: x / pH: x  Gluc: 118 mg/dL / Ketone: x  / Bili: x / Urobili: x   Blood: x / Protein: x / Nitrite: x   Leuk Esterase: x / RBC: x / WBC x   Sq Epi: x / Non Sq Epi: x / Bacteria: x            RADIOLOGY, EKG & ADDITIONAL TESTS: Reviewed.     EKG: NSR at 70 BPM, normal axis, no acute ST-T changes, QTc: 429    CT Abdomen/Pelvis:  PROCEDURE:  CT of the Abdomen and Pelvis was performed.  Sagittal and coronal reformats were performed.    FINDINGS:  LOWER CHEST: Bibasilar subsegmental atelectasis. Punctate calcified   granuloma in the right middle lobe.    LIVER: Within normal limits.  BILE DUCTS: Mild intrahepatic biliary ductal dilatation and common bile   duct dilatation up to 7 mm, likely secondary to postcholecystectomy state.  GALLBLADDER: Cholecystectomy.  SPLEEN: Nonspecific innumerable hypoattenuating foci in the spleen,   likely benign.  PANCREAS: Extensive peripancreatic edema and fluid. No loculated fluid   collections. Diffuse edematous pancreatic parenchyma without focal   hypoattenuation/hypoenhancement to suggest necrosis. No pancreatic ductal   dilatation.  ADRENALS: Within normal limits.  KIDNEYS/URETERS: Bilateral renal cysts. No hydronephrosis.    BLADDER: Within normal limits.  REPRODUCTIVE ORGANS: Uterus and adnexa within normal limits.    BOWEL: Mild fluid distention of the distal esophagus. The pancreatic   stent noted in the terminal ileum/ascending colon. No bowel obstruction.   Small hiatal hernia. Appendix is not visualized.  PERITONEUM: Small ascites. No pneumoperitoneum.  VESSELS: Within normal limits.  RETROPERITONEUM/LYMPH NODES: No lymphadenopathy.  ABDOMINAL WALL: Within normal limits.  BONES: Degenerative changes.    IMPRESSION:  Severe acute interstitial edematous pancreatitis with extensive   peripancreatic edema/fluid. No CT evidence of necrosis.  The pancreatic stent migration, which is in the terminal ileum/ascending  colon. No evidence of bowel obstruction.  Further evaluation recommended. LABS:                         14.9   10.38 )-----------( 213      ( 25 Jul 2023 09:41 )             43.7     07-25    138  |  103  |  10  ----------------------------<  118<H>  4.1   |  19<L>  |  0.71    Ca    9.2      25 Jul 2023 09:41  Phos  3.9     07-25  Mg     1.90     07-25    TPro  6.6  /  Alb  3.9  /  TBili  0.6  /  DBili  x   /  AST  21  /  ALT  24  /  AlkPhos  68  07-25    PT/INR - ( 25 Jul 2023 09:41 )   PT: 11.8 sec;   INR: 1.02 ratio         PTT - ( 25 Jul 2023 09:41 )  PTT:24.9 sec  Urinalysis Basic - ( 25 Jul 2023 09:41 )    Color: x / Appearance: x / SG: x / pH: x  Gluc: 118 mg/dL / Ketone: x  / Bili: x / Urobili: x   Blood: x / Protein: x / Nitrite: x   Leuk Esterase: x / RBC: x / WBC x   Sq Epi: x / Non Sq Epi: x / Bacteria: x      RADIOLOGY, EKG & ADDITIONAL TESTS: Reviewed.     EKG personally reviewed: NSR at 70 BPM, normal axis, no acute ST-T changes, QTc: 429    CT Abdomen/Pelvis:  PROCEDURE:  CT of the Abdomen and Pelvis was performed.  Sagittal and coronal reformats were performed.    FINDINGS:  LOWER CHEST: Bibasilar subsegmental atelectasis. Punctate calcified   granuloma in the right middle lobe.    LIVER: Within normal limits.  BILE DUCTS: Mild intrahepatic biliary ductal dilatation and common bile   duct dilatation up to 7 mm, likely secondary to postcholecystectomy state.  GALLBLADDER: Cholecystectomy.  SPLEEN: Nonspecific innumerable hypoattenuating foci in the spleen,   likely benign.  PANCREAS: Extensive peripancreatic edema and fluid. No loculated fluid   collections. Diffuse edematous pancreatic parenchyma without focal   hypoattenuation/hypoenhancement to suggest necrosis. No pancreatic ductal   dilatation.  ADRENALS: Within normal limits.  KIDNEYS/URETERS: Bilateral renal cysts. No hydronephrosis.    BLADDER: Within normal limits.  REPRODUCTIVE ORGANS: Uterus and adnexa within normal limits.    BOWEL: Mild fluid distention of the distal esophagus. The pancreatic   stent noted in the terminal ileum/ascending colon. No bowel obstruction.   Small hiatal hernia. Appendix is not visualized.  PERITONEUM: Small ascites. No pneumoperitoneum.  VESSELS: Within normal limits.  RETROPERITONEUM/LYMPH NODES: No lymphadenopathy.  ABDOMINAL WALL: Within normal limits.  BONES: Degenerative changes.    IMPRESSION:  Severe acute interstitial edematous pancreatitis with extensive   peripancreatic edema/fluid. No CT evidence of necrosis.  The pancreatic stent migration, which is in the terminal ileum/ascending  colon. No evidence of bowel obstruction.  Further evaluation recommended.

## 2023-07-25 NOTE — H&P ADULT - NSHPSOCIALHISTORY_GEN_ALL_CORE
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 Active lifestyle. 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 Active lifestyle. Denies alcohol use, tobacco use, and illicit drug use.

## 2023-07-25 NOTE — ED PROVIDER NOTE - CONSIDERATION OF ADMISSION OBSERVATION
Patient found to have severe pancreatitis and will require admission for further treatment and evaluation. Consideration of Admission/Observation

## 2023-07-25 NOTE — ED ADULT NURSE REASSESSMENT NOTE - NS ED NURSE REASSESS COMMENT FT1
patient received from day RN Azalia at 1210 at baseline mental status. patient c/o pain- medication given per orders. Breathing even and unlabored, pallor/diaphoresis not noted. Vital signs as charted. Denies chest pain, shortness of breath, nausea or vomiting. IV site clean dry and intact.

## 2023-07-25 NOTE — ED ADULT NURSE NOTE - OBJECTIVE STATEMENT
pt received to room #1, with c/o epigastric pain radiating to the general abd. pt is s/p ERCP with stent placement. c/o intractable pain starting this morning and 1 episode of vomiting. pt aox4. abd soft, generally tender, nondistended. blood work obtained and sent to lab.  at bedside. medication given as per MDs orders. pt pending CT scan.

## 2023-07-25 NOTE — PATIENT PROFILE ADULT - FALL HARM RISK - HARM RISK INTERVENTIONS

## 2023-07-25 NOTE — PATIENT PROFILE ADULT - TRANSPORTATION
Received patient alert and oriented X4 on room air. Hemodynamically stable. Tachycardic. 1L LR bolus given, HR improved. Insulin titrated per base algorithm. Urine culture obtained. IVFs infusing at 150ml/hr. Ice chips given. PRN Zofran given for nausea. no

## 2023-07-25 NOTE — ED PROVIDER NOTE - CLINICAL SUMMARY MEDICAL DECISION MAKING FREE TEXT BOX
57 yo F with hx of HTN and GERD with acute severe epigastric abdominal pain s/p ERCP yesterday. Hemodynamically stable, afebrile. Has epigastric TTP on exam but no peritoneal signs. DDx includes but is not limited to perforation, pancreatitis, AAA, biliary obstruction, ACS, others. EKG is NSR without signs of ischemia. Plan for pain control and anti-emetics, CXR to eval for perforation, as well as CBC, CMP, lipase, coags, and CTAP to evaluate further then reassess with her procedural team.

## 2023-07-25 NOTE — DISCHARGE NOTE PROVIDER - NSDCCPCAREPLAN_GEN_ALL_CORE_FT
PRINCIPAL DISCHARGE DIAGNOSIS  Diagnosis: Pancreatitis  Assessment and Plan of Treatment: During this hospitalization you were found to have pancreatitis which is inflammation of the pancreas. Symptoms of pancreatitis includes abdominal pain, decrease in appetite, nausea, and vomiting. Causes of pancreatitis include alcohol consumption, kayla triglyceride levels, mechanical injury, medicine side effect, etc. The likely cause of your pancreatitis is recent ERCP. Treatment of pancreatitis includes IV fluid hydration, controling nausea with the use of anti-nausea medications, and management of pain with medications. Please follow up with your primary care physician and Gastrointerologist for further management.   If you experience symptoms such as shortness of breath, fever, low blood pressure, abdominal pain, uncontrollable vomiting, or any other changes in your overall health please go to the emergency room.      SECONDARY DISCHARGE DIAGNOSES  Diagnosis: Urinary tract infection  Assessment and Plan of Treatment: UTI is an infection of the urinary tract. Symptoms include fever, pain on urination, blood in the urine, and increased urge to urinate. UTIs are often treated with antibiotics which was given during your hospital stay. Please follow up with your primary care physician for further management of care.  If you experience symptoms such as uncontrollable fevers, persistent pain on urination, blood noted in the urine, increased frequency of urination, lack of urine production, pain radiating to the back, or any other changes in your overall health please do to the emergency department.

## 2023-07-25 NOTE — H&P ADULT - PROBLEM SELECTOR PLAN 3
DVT ppx: Lovenox 40 subq daily  GI: Pantoprazole 40 mg daily  Diet: NPO currently, advance as tolerated  ANNE:   Dispo: Medically active    Code: FULL DVT ppx: SCDs  GI: Pantoprazole 40 mg daily  Diet: NPO currently, advance as tolerated  ANNE: NS @ 100cc/hour  Bowel: Miraalax and Senna  Dispo: Medically active    Code: FULL

## 2023-07-25 NOTE — DISCHARGE NOTE PROVIDER - CARE PROVIDER_API CALL
Jesus Prajapati  Gastroenterology  [address] [place] [address], [address]  [place] [address]  Phone: [phone]  Fax: [phone]  Established Patient  Follow Up Time: 1 week

## 2023-07-25 NOTE — ED ADULT NURSE NOTE - NS ED PATIENT SAFETY CONCERN
[facility] Dermatology  Adalid Henderson MD  [phone]      Pardeep Gray  1975    52 y.o. male     Date of Visit: 3/15/2023    Chief Complaint: skin lesions    History of Present Illness:    1. He returns today to follow-up for history of multiple actinic keratoses of the vertex scalp. He complains of multiple scaly lesions on the scalp and left temple today. He has been treated with cryotherapy in the past.    2.  He complains of a growth on the abdomen. Has had similar lesions in the past.  He reports that it is only tender when he rubs the lesion. Dermatologic history:     Has hx of likely AKs - treated with cryotherapy. Reports hx of lipomas - few removed in the past.        Review of Systems:  Gen: Feels well, good sense of health. Skin: No new or changing moles. Past Medical History, Family History, Surgical History, Medications and Allergies reviewed. Past Medical History:   Diagnosis Date    Diabetes ([facility] [address]     Hypertension      History reviewed. No pertinent surgical history. No Known Allergies  Outpatient Medications Marked as Taking for the 3/15/23 encounter (Office Visit) with Memory Crigler, MD   Medication Sig Dispense Refill    TRESIBA FLEXTOUCH 200 UNIT/ML SOPN USE AS DIRECTED UP TO MAX DAILY DOSE  UNITS. fluorouracil (EFUDEX) 5 % cream Apply topically 2 times daily for 2 weeks to the top of the scalp and left temple.  40 g 0    atorvastatin (LIPITOR) 40 MG tablet TAKE 1 TABLET DAILY      glipiZIDE (GLUCOTROL XL) 5 MG extended release tablet TAKE 1 TABLET DAILY      losartan (COZAAR) 100 MG tablet Take 100 mg by mouth daily      metFORMIN (GLUCOPHAGE) 500 MG tablet Take 500 mg by mouth See Admin Instructions      Blood Glucose Monitoring Suppl (ACCU-CHEK AVERY PLUS) w/Device KIT Check sugar once daily- non insulin type 2    Please dispense whichever brand the insurance prefers      Lancets Misc. (ACCU-CHEK SOFTCLIX LANCET DEV) KIT Check sugar Patient has been scheduled with Dr. Christensen on 12/6/2018 for feedback. Appointment with [facility] has been rescheduled for 12/14/2018.   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 Went to [employer]. Kids went to [employer]. Physical Examination       The following were examined and determined to be normal: Psych/Neuro, Head/face, Conjunctivae/eyelids, Gums/teeth/lips, Neck, Breast/axilla/chest, Abdomen, Back, RUE, LUE, RLE, LLE, and Nails/digits. The following were examined and determined to be abnormal: Scalp/hair. Well appearing. Hurtado skin type I. Freckling appreciated on sun exposed portions of the upper extremities, scalp and face. 1.  Vertex scalp and left temple with multiple ill-defined scaly pink and skin colored macules. 2.  Right upper abdomen with a 2 cm subcutaneous nodule. Assessment and Plan     1. Actinic keratoses -multiple of the vertex scalp and left temple    Given the number and extent of lesions, discussed field treatment with Efudex therapy to which patient is agreeable. Efudex cream twice daily to the vertex scalp for 14 days. We discussed the likely side effects of treatment including redness, crusting, itching and burning. 2. Lipoma of skin of abdomen - small, only symptomatic with manipulation    Observe. Consider excision in the future. Return in about 3 months (around 6/15/2023).     --Natalia Matute MD No

## 2023-07-25 NOTE — CONSULT NOTE ADULT - ASSESSMENT
58 year old female with history of HTN, GERD, and elevated liver chemistries who presents with abdominal pain after ERCP.    # Post-ERCP pancreatitis  -EGD/EUS/ERCP on 7/24/2023 that revealed 2cm hiatal hernia, copious CBD sludge, and CBD dilatation to ampulla, stenotic papilla, 5F x 6cm single pigtail PD stent, biliary sphincterotomy, and sludge/small fragments removed using balloon catheter  -upon admission, severe/sharp epigastric pain, lipase >3000, liver chemistries normal, CT A/P with severe acute interstitial edematous pancreatitis with extensive peripancreatic edema/fluid    Recommendations:  -trend clinical symptoms, exam findings, vital signs, CBC, CMP, INR  -complete infectious workup and f/u BCx  -aggressive IVF  -pain control and antiemetics as QTc allows    Note incomplete until finalized by attending signature/attestation.    Toan Murillo  GI/Hepatology Fellow    MONDAY-FRIDAY 8AM-5PM:  Pager# [phone] ([facility]) or [phone] ([facility])    NON-URGENT CONSULTS:  Please email [email or web address] OR [email or web address]  AT NIGHT AND ON WEEKENDS:  Contact on-call GI fellow via answering service ([phone]) from 5pm-8am and on weekends/holidays

## 2023-07-25 NOTE — H&P ADULT - ASSESSMENT
58 year old female with history of HTN, GERD, s/p cholecystectomy in 2013 presents with severe sharp abdominal pain 1 day s/p ERCP (07/24/2023) with 1 episode of bilious emesis in the setting of lipase>3000 and CT findings for severe pancreatitis, current presentation likely secondary to recent procedure. BISAP score of 0; <1 % mortality rate.

## 2023-07-26 PROBLEM — R74.8 ABNORMAL LEVELS OF OTHER SERUM ENZYMES: Chronic | Status: ACTIVE | Noted: 2023-07-18

## 2023-07-26 LAB
ALBUMIN SERPL ELPH-MCNC: 3.8 G/DL — SIGNIFICANT CHANGE UP (ref 3.3–5)
ALP SERPL-CCNC: 71 U/L — SIGNIFICANT CHANGE UP (ref 40–120)
ALT FLD-CCNC: 86 U/L — HIGH (ref 4–33)
ANION GAP SERPL CALC-SCNC: 11 MMOL/L — SIGNIFICANT CHANGE UP (ref 7–14)
APPEARANCE UR: ABNORMAL
APTT BLD: 26.1 SEC — SIGNIFICANT CHANGE UP (ref 24.5–35.6)
AST SERPL-CCNC: 53 U/L — HIGH (ref 4–32)
BACTERIA # UR AUTO: ABNORMAL /HPF
BASOPHILS # BLD AUTO: 0.02 K/UL — SIGNIFICANT CHANGE UP (ref 0–0.2)
BASOPHILS NFR BLD AUTO: 0.1 % — SIGNIFICANT CHANGE UP (ref 0–2)
BILIRUB SERPL-MCNC: 0.9 MG/DL — SIGNIFICANT CHANGE UP (ref 0.2–1.2)
BILIRUB UR-MCNC: ABNORMAL
BUN SERPL-MCNC: 10 MG/DL — SIGNIFICANT CHANGE UP (ref 7–23)
CALCIUM SERPL-MCNC: 8.4 MG/DL — SIGNIFICANT CHANGE UP (ref 8.4–10.5)
CAST: 44 /LPF — HIGH (ref 0–4)
CHLORIDE SERPL-SCNC: 100 MMOL/L — SIGNIFICANT CHANGE UP (ref 98–107)
CO2 SERPL-SCNC: 26 MMOL/L — SIGNIFICANT CHANGE UP (ref 22–31)
COLOR SPEC: ABNORMAL
CREAT SERPL-MCNC: 0.74 MG/DL — SIGNIFICANT CHANGE UP (ref 0.5–1.3)
DIFF PNL FLD: NEGATIVE — SIGNIFICANT CHANGE UP
EGFR: 94 ML/MIN/1.73M2 — SIGNIFICANT CHANGE UP
EOSINOPHIL # BLD AUTO: 0 K/UL — SIGNIFICANT CHANGE UP (ref 0–0.5)
EOSINOPHIL NFR BLD AUTO: 0 % — SIGNIFICANT CHANGE UP (ref 0–6)
GLUCOSE SERPL-MCNC: 130 MG/DL — HIGH (ref 70–99)
GLUCOSE UR QL: 100 MG/DL
HCT VFR BLD CALC: 47.9 % — HIGH (ref 34.5–45)
HCV AB S/CO SERPL IA: 0.09 S/CO — SIGNIFICANT CHANGE UP (ref 0–0.99)
HCV AB SERPL-IMP: SIGNIFICANT CHANGE UP
HGB BLD-MCNC: 15.7 G/DL — HIGH (ref 11.5–15.5)
IANC: 15.34 K/UL — HIGH (ref 1.8–7.4)
IMM GRANULOCYTES NFR BLD AUTO: 0.3 % — SIGNIFICANT CHANGE UP (ref 0–0.9)
INR BLD: 1.09 RATIO — SIGNIFICANT CHANGE UP (ref 0.85–1.18)
KETONES UR-MCNC: NEGATIVE MG/DL — SIGNIFICANT CHANGE UP
LEUKOCYTE ESTERASE UR-ACNC: ABNORMAL
LYMPHOCYTES # BLD AUTO: 0.59 K/UL — LOW (ref 1–3.3)
LYMPHOCYTES # BLD AUTO: 3.4 % — LOW (ref 13–44)
MAGNESIUM SERPL-MCNC: 1.7 MG/DL — SIGNIFICANT CHANGE UP (ref 1.6–2.6)
MCHC RBC-ENTMCNC: 30.3 PG — SIGNIFICANT CHANGE UP (ref 27–34)
MCHC RBC-ENTMCNC: 32.8 GM/DL — SIGNIFICANT CHANGE UP (ref 32–36)
MCV RBC AUTO: 92.5 FL — SIGNIFICANT CHANGE UP (ref 80–100)
MONOCYTES # BLD AUTO: 1.11 K/UL — HIGH (ref 0–0.9)
MONOCYTES NFR BLD AUTO: 6.5 % — SIGNIFICANT CHANGE UP (ref 2–14)
NEUTROPHILS # BLD AUTO: 15.34 K/UL — HIGH (ref 1.8–7.4)
NEUTROPHILS NFR BLD AUTO: 89.7 % — HIGH (ref 43–77)
NITRITE UR-MCNC: POSITIVE
NRBC # BLD: 0 /100 WBCS — SIGNIFICANT CHANGE UP (ref 0–0)
NRBC # FLD: 0 K/UL — SIGNIFICANT CHANGE UP (ref 0–0)
PH UR: 5.5 — SIGNIFICANT CHANGE UP (ref 5–8)
PHOSPHATE SERPL-MCNC: 4.2 MG/DL — SIGNIFICANT CHANGE UP (ref 2.5–4.5)
PLATELET # BLD AUTO: 243 K/UL — SIGNIFICANT CHANGE UP (ref 150–400)
POTASSIUM SERPL-MCNC: 5.1 MMOL/L — SIGNIFICANT CHANGE UP (ref 3.5–5.3)
POTASSIUM SERPL-SCNC: 5.1 MMOL/L — SIGNIFICANT CHANGE UP (ref 3.5–5.3)
PROT SERPL-MCNC: 6.3 G/DL — SIGNIFICANT CHANGE UP (ref 6–8.3)
PROT UR-MCNC: 100 MG/DL
PROTHROM AB SERPL-ACNC: 12.3 SEC — SIGNIFICANT CHANGE UP (ref 9.5–13)
RBC # BLD: 5.18 M/UL — SIGNIFICANT CHANGE UP (ref 3.8–5.2)
RBC # FLD: 12.7 % — SIGNIFICANT CHANGE UP (ref 10.3–14.5)
RBC CASTS # UR COMP ASSIST: 2 /HPF — SIGNIFICANT CHANGE UP (ref 0–4)
SODIUM SERPL-SCNC: 137 MMOL/L — SIGNIFICANT CHANGE UP (ref 135–145)
SP GR SPEC: 1.03 — HIGH (ref 1–1.03)
SQUAMOUS # UR AUTO: 12 /HPF — HIGH (ref 0–5)
UROBILINOGEN FLD QL: 1 MG/DL — SIGNIFICANT CHANGE UP (ref 0.2–1)
WBC # BLD: 17.11 K/UL — HIGH (ref 3.8–10.5)
WBC # FLD AUTO: 17.11 K/UL — HIGH (ref 3.8–10.5)
WBC UR QL: 7 /HPF — HIGH (ref 0–5)

## 2023-07-26 PROCEDURE — 99232 SBSQ HOSP IP/OBS MODERATE 35: CPT | Mod: GC

## 2023-07-26 PROCEDURE — 93010 ELECTROCARDIOGRAM REPORT: CPT

## 2023-07-26 RX ORDER — FAMOTIDINE 10 MG/ML
40 INJECTION INTRAVENOUS AT BEDTIME
Refills: 0 | Status: DISCONTINUED | OUTPATIENT
Start: 2023-07-26 | End: 2023-07-26

## 2023-07-26 RX ORDER — PROCHLORPERAZINE MALEATE 5 MG
10 TABLET ORAL ONCE
Refills: 0 | Status: DISCONTINUED | OUTPATIENT
Start: 2023-07-26 | End: 2023-07-26

## 2023-07-26 RX ORDER — SENNA PLUS 8.6 MG/1
1 TABLET ORAL AT BEDTIME
Refills: 0 | Status: DISCONTINUED | OUTPATIENT
Start: 2023-07-26 | End: 2023-07-27

## 2023-07-26 RX ORDER — PROCHLORPERAZINE MALEATE 5 MG
5 TABLET ORAL EVERY 4 HOURS
Refills: 0 | Status: DISCONTINUED | OUTPATIENT
Start: 2023-07-26 | End: 2023-07-31

## 2023-07-26 RX ORDER — PANTOPRAZOLE SODIUM 20 MG/1
40 TABLET, DELAYED RELEASE ORAL ONCE
Refills: 0 | Status: DISCONTINUED | OUTPATIENT
Start: 2023-07-26 | End: 2023-07-26

## 2023-07-26 RX ORDER — AMLODIPINE BESYLATE 2.5 MG/1
5 TABLET ORAL ONCE
Refills: 0 | Status: DISCONTINUED | OUTPATIENT
Start: 2023-07-26 | End: 2023-07-26

## 2023-07-26 RX ORDER — AMLODIPINE BESYLATE 2.5 MG/1
5 TABLET ORAL DAILY
Refills: 0 | Status: DISCONTINUED | OUTPATIENT
Start: 2023-07-26 | End: 2023-08-04

## 2023-07-26 RX ORDER — CEFTRIAXONE 500 MG/1
1000 INJECTION, POWDER, FOR SOLUTION INTRAMUSCULAR; INTRAVENOUS EVERY 24 HOURS
Refills: 0 | Status: DISCONTINUED | OUTPATIENT
Start: 2023-07-27 | End: 2023-07-28

## 2023-07-26 RX ORDER — PHENAZOPYRIDINE HCL 100 MG
100 TABLET ORAL EVERY 8 HOURS
Refills: 0 | Status: DISCONTINUED | OUTPATIENT
Start: 2023-07-26 | End: 2023-07-27

## 2023-07-26 RX ORDER — SODIUM CHLORIDE 9 MG/ML
1000 INJECTION, SOLUTION INTRAVENOUS
Refills: 0 | Status: DISCONTINUED | OUTPATIENT
Start: 2023-07-26 | End: 2023-07-29

## 2023-07-26 RX ORDER — CEFTRIAXONE 500 MG/1
INJECTION, POWDER, FOR SOLUTION INTRAMUSCULAR; INTRAVENOUS
Refills: 0 | Status: DISCONTINUED | OUTPATIENT
Start: 2023-07-26 | End: 2023-07-28

## 2023-07-26 RX ORDER — GLYCERIN ADULT
1 SUPPOSITORY, RECTAL RECTAL DAILY
Refills: 0 | Status: DISCONTINUED | OUTPATIENT
Start: 2023-07-26 | End: 2023-07-26

## 2023-07-26 RX ORDER — ACETAMINOPHEN 500 MG
1000 TABLET ORAL ONCE
Refills: 0 | Status: COMPLETED | OUTPATIENT
Start: 2023-07-26 | End: 2023-07-26

## 2023-07-26 RX ORDER — CEFTRIAXONE 500 MG/1
1000 INJECTION, POWDER, FOR SOLUTION INTRAMUSCULAR; INTRAVENOUS ONCE
Refills: 0 | Status: COMPLETED | OUTPATIENT
Start: 2023-07-26 | End: 2023-07-26

## 2023-07-26 RX ORDER — PROCHLORPERAZINE MALEATE 5 MG
5 TABLET ORAL ONCE
Refills: 0 | Status: COMPLETED | OUTPATIENT
Start: 2023-07-26 | End: 2023-07-26

## 2023-07-26 RX ORDER — PANTOPRAZOLE SODIUM 20 MG/1
40 TABLET, DELAYED RELEASE ORAL ONCE
Refills: 0 | Status: COMPLETED | OUTPATIENT
Start: 2023-07-26 | End: 2023-07-26

## 2023-07-26 RX ORDER — FAMOTIDINE 10 MG/ML
20 INJECTION INTRAVENOUS AT BEDTIME
Refills: 0 | Status: DISCONTINUED | OUTPATIENT
Start: 2023-07-26 | End: 2023-08-04

## 2023-07-26 RX ORDER — PANTOPRAZOLE SODIUM 20 MG/1
TABLET, DELAYED RELEASE ORAL
Refills: 0 | Status: DISCONTINUED | OUTPATIENT
Start: 2023-07-26 | End: 2023-07-30

## 2023-07-26 RX ORDER — PANTOPRAZOLE SODIUM 20 MG/1
40 TABLET, DELAYED RELEASE ORAL DAILY
Refills: 0 | Status: DISCONTINUED | OUTPATIENT
Start: 2023-07-27 | End: 2023-07-30

## 2023-07-26 RX ORDER — SIMETHICONE 80 MG/1
80 TABLET, CHEWABLE ORAL EVERY 6 HOURS
Refills: 0 | Status: DISCONTINUED | OUTPATIENT
Start: 2023-07-26 | End: 2023-08-04

## 2023-07-26 RX ORDER — PROCHLORPERAZINE MALEATE 5 MG
5 TABLET ORAL ONCE
Refills: 0 | Status: DISCONTINUED | OUTPATIENT
Start: 2023-07-26 | End: 2023-07-26

## 2023-07-26 RX ADMIN — HYDROMORPHONE HYDROCHLORIDE 2 MILLIGRAM(S): 2 INJECTION INTRAMUSCULAR; INTRAVENOUS; SUBCUTANEOUS at 02:39

## 2023-07-26 RX ADMIN — AMLODIPINE BESYLATE 5 MILLIGRAM(S): 2.5 TABLET ORAL at 09:49

## 2023-07-26 RX ADMIN — Medication 1000 MILLIGRAM(S): at 22:55

## 2023-07-26 RX ADMIN — HYDROMORPHONE HYDROCHLORIDE 2 MILLIGRAM(S): 2 INJECTION INTRAMUSCULAR; INTRAVENOUS; SUBCUTANEOUS at 10:05

## 2023-07-26 RX ADMIN — SODIUM CHLORIDE 100 MILLILITER(S): 9 INJECTION, SOLUTION INTRAVENOUS at 02:09

## 2023-07-26 RX ADMIN — Medication 5 MILLIGRAM(S): at 04:51

## 2023-07-26 RX ADMIN — ONDANSETRON 4 MILLIGRAM(S): 8 TABLET, FILM COATED ORAL at 02:09

## 2023-07-26 RX ADMIN — SODIUM CHLORIDE 125 MILLILITER(S): 9 INJECTION, SOLUTION INTRAVENOUS at 21:15

## 2023-07-26 RX ADMIN — PANTOPRAZOLE SODIUM 40 MILLIGRAM(S): 20 TABLET, DELAYED RELEASE ORAL at 09:49

## 2023-07-26 RX ADMIN — FAMOTIDINE 20 MILLIGRAM(S): 10 INJECTION INTRAVENOUS at 21:15

## 2023-07-26 RX ADMIN — SIMETHICONE 80 MILLIGRAM(S): 80 TABLET, CHEWABLE ORAL at 22:25

## 2023-07-26 RX ADMIN — HYDROMORPHONE HYDROCHLORIDE 2 MILLIGRAM(S): 2 INJECTION INTRAMUSCULAR; INTRAVENOUS; SUBCUTANEOUS at 13:16

## 2023-07-26 RX ADMIN — HYDROMORPHONE HYDROCHLORIDE 2 MILLIGRAM(S): 2 INJECTION INTRAMUSCULAR; INTRAVENOUS; SUBCUTANEOUS at 05:09

## 2023-07-26 RX ADMIN — SENNA PLUS 1 TABLET(S): 8.6 TABLET ORAL at 21:15

## 2023-07-26 RX ADMIN — HYDROMORPHONE HYDROCHLORIDE 2 MILLIGRAM(S): 2 INJECTION INTRAMUSCULAR; INTRAVENOUS; SUBCUTANEOUS at 13:44

## 2023-07-26 RX ADMIN — HYDROMORPHONE HYDROCHLORIDE 2 MILLIGRAM(S): 2 INJECTION INTRAMUSCULAR; INTRAVENOUS; SUBCUTANEOUS at 02:09

## 2023-07-26 RX ADMIN — CEFTRIAXONE 1000 MILLIGRAM(S): 500 INJECTION, POWDER, FOR SOLUTION INTRAMUSCULAR; INTRAVENOUS at 18:55

## 2023-07-26 RX ADMIN — HYDROMORPHONE HYDROCHLORIDE 2 MILLIGRAM(S): 2 INJECTION INTRAMUSCULAR; INTRAVENOUS; SUBCUTANEOUS at 09:58

## 2023-07-26 RX ADMIN — Medication 100 MILLIGRAM(S): at 21:15

## 2023-07-26 RX ADMIN — Medication 400 MILLIGRAM(S): at 22:25

## 2023-07-26 RX ADMIN — SODIUM CHLORIDE 100 MILLILITER(S): 9 INJECTION, SOLUTION INTRAVENOUS at 09:49

## 2023-07-26 RX ADMIN — HYDROMORPHONE HYDROCHLORIDE 2 MILLIGRAM(S): 2 INJECTION INTRAMUSCULAR; INTRAVENOUS; SUBCUTANEOUS at 05:39

## 2023-07-26 NOTE — PROGRESS NOTE ADULT - SUBJECTIVE AND OBJECTIVE BOX
Interval Events:   No acute events overnight.  Patient endorses some improvement in abdominal pain, however endorses N/V overnight with a "pressure...maybe some burning" over her bladder.    ROS:   12 point review of systems performed and negative except otherwise noted in HPI.    Hospital Medications:  acetaminophen     Tablet .. 650 milliGRAM(s) Oral every 6 hours PRN  aluminum hydroxide/magnesium hydroxide/simethicone Suspension 30 milliLiter(s) Oral every 4 hours PRN  amLODIPine   Tablet 5 milliGRAM(s) Oral daily  bisacodyl Suppository 10 milliGRAM(s) Rectal daily PRN  famotidine    Tablet 20 milliGRAM(s) Oral at bedtime  HYDROmorphone  Injectable 2 milliGRAM(s) IV Push every 3 hours PRN  HYDROmorphone  Injectable 1 milliGRAM(s) IV Push every 3 hours PRN  lactated ringers. 1000 milliLiter(s) IV Continuous <Continuous>  melatonin 3 milliGRAM(s) Oral at bedtime PRN  ondansetron Injectable 4 milliGRAM(s) IV Push every 8 hours PRN  pantoprazole  Injectable      prochlorperazine   Injectable 5 milliGRAM(s) IV Push every 4 hours PRN  senna 1 Tablet(s) Oral at bedtime PRN      PHYSICAL EXAM:   Vital Signs:  Vital Signs Last 24 Hrs  T(C): 36.9 (2023 14:25), Max: 37.1 (2023 09:45)  T(F): 98.4 (2023 14:25), Max: 98.8 (2023 09:45)  HR: 136 (2023 14:27) (70 - 136)  BP: 120/86 (2023 14:25) (120/86 - 140/88)  BP(mean): --  RR: 18 (2023 14:25) (16 - 18)  SpO2: 92% (2023 14:40) (86% - 98%)    Parameters below as of 2023 14:40  Patient On (Oxygen Delivery Method): room air      Daily     Daily Weight in k.2 (2023 06:00)    GENERAL: no acute distress  NEURO: alert  HEENT: NCAT, no conjunctival pallor appreciated  CHEST: no respiratory distress, no accessory muscle use  CARDIAC: regular rate, +S1/S2  ABDOMEN: soft, epigastric tenderness, no rebound or guarding  EXTREMITIES: warm, well perfused  SKIN: no lesions noted    LABS: reviewed                        15.7   17.11 )-----------( 243      ( 2023 06:15 )             47.9         137  |  100  |  10  ----------------------------<  130<H>  5.1   |  26  |  0.74    Ca    8.4      2023 06:15  Phos  4.2       Mg     1.70         TPro  6.3  /  Alb  3.8  /  TBili  0.9  /  DBili  x   /  AST  53<H>  /  ALT  86<H>  /  AlkPhos  71      LIVER FUNCTIONS - ( 2023 06:15 )  Alb: 3.8 g/dL / Pro: 6.3 g/dL / ALK PHOS: 71 U/L / ALT: 86 U/L / AST: 53 U/L / GGT: x             Interval Diagnostic Studies: see sunrise for full report

## 2023-07-26 NOTE — PROGRESS NOTE ADULT - SUBJECTIVE AND OBJECTIVE BOX
PROGRESS NOTE:   Authored by Jose Fong MD  Internal Medicine      Patient is a 58y old  Female who presents with a chief complaint of Severe abdominal pain s/p ERCP yesterday (26 Jul 2023 06:45)      SUBJECTIVE / OVERNIGHT EVENTS:   2 episodes of nausea and vomiting overnight.  Received IV acetaminophen and Prochlorperazine.   States that Prochlorperazine works better than Zofran for her nausea.    Patient seen and examined at bedside.  States that pain is diffuse and grossly unchanged from yesterday, radiates to the back.  Is "splinting" when breathing due to pain.   Unable to obtain an comfortable position.   Does not wish to advance diet from NPO.  Wishes to move around today and sit on chair.   Denies chest pain, shortness of breath, diarrhea, constipation, fever, chills      MEDICATIONS  (STANDING):  amLODIPine   Tablet 5 milliGRAM(s) Oral daily  lactated ringers. 1000 milliLiter(s) (125 mL/Hr) IV Continuous <Continuous>  pantoprazole  Injectable        MEDICATIONS  (PRN):  acetaminophen     Tablet .. 650 milliGRAM(s) Oral every 6 hours PRN Temp greater or equal to 38C (100.4F), Mild Pain (1 - 3)  aluminum hydroxide/magnesium hydroxide/simethicone Suspension 30 milliLiter(s) Oral every 4 hours PRN Dyspepsia  HYDROmorphone  Injectable 2 milliGRAM(s) IV Push every 3 hours PRN Severe Pain (7 - 10)  HYDROmorphone  Injectable 1 milliGRAM(s) IV Push every 3 hours PRN Moderate Pain (4 - 6)  melatonin 3 milliGRAM(s) Oral at bedtime PRN Insomnia  ondansetron Injectable 4 milliGRAM(s) IV Push every 8 hours PRN Nausea and/or Vomiting  prochlorperazine   Injectable 5 milliGRAM(s) IV Push every 4 hours PRN Nausea & Vomiting      CAPILLARY BLOOD GLUCOSE        I&O's Summary    25 Jul 2023 07:01  -  26 Jul 2023 07:00  --------------------------------------------------------  IN: 700 mL / OUT: 1000 mL / NET: -300 mL        PHYSICAL EXAM:  Vital Signs Last 24 Hrs  T(C): 37.1 (26 Jul 2023 09:45), Max: 37.1 (26 Jul 2023 09:45)  T(F): 98.8 (26 Jul 2023 09:45), Max: 98.8 (26 Jul 2023 09:45)  HR: 103 (26 Jul 2023 09:45) (64 - 104)  BP: 133/98 (26 Jul 2023 09:45) (112/72 - 142/85)  BP(mean): --  RR: 16 (26 Jul 2023 09:45) (16 - 19)  SpO2: 93% (26 Jul 2023 09:45) (92% - 100%)    Parameters below as of 26 Jul 2023 09:45  Patient On (Oxygen Delivery Method): room air      General: Patient is alert, awake, and in moderate distress.  HEENT: Dry mucous membranes. Sclera anicteric.   Cardiovascular: S1S2 intact. No murmurs, gallops, rubs.   Pulm: Lungs clear to auscultation. No increased work of breathing.  Abdomen: Soft.  Diffuse abdominal tenderness. Minimal bowel sounds.   Extremities: No edema to the extremities.    LABS:                        15.7   17.11 )-----------( 243      ( 26 Jul 2023 06:15 )             47.9     07-26    137  |  100  |  10  ----------------------------<  130<H>  5.1   |  26  |  0.74    Ca    8.4      26 Jul 2023 06:15  Phos  4.2     07-26  Mg     1.70     07-26    TPro  6.3  /  Alb  3.8  /  TBili  0.9  /  DBili  x   /  AST  53<H>  /  ALT  86<H>  /  AlkPhos  71  07-26    PT/INR - ( 26 Jul 2023 06:15 )   PT: 12.3 sec;   INR: 1.09 ratio         PTT - ( 26 Jul 2023 06:15 )  PTT:26.1 sec      Urinalysis Basic - ( 26 Jul 2023 06:15 )    Color: x / Appearance: x / SG: x / pH: x  Gluc: 130 mg/dL / Ketone: x  / Bili: x / Urobili: x   Blood: x / Protein: x / Nitrite: x   Leuk Esterase: x / RBC: x / WBC x   Sq Epi: x / Non Sq Epi: x / Bacteria: x

## 2023-07-26 NOTE — PROGRESS NOTE ADULT - PROBLEM SELECTOR PLAN 3
DVT ppx: SCDs, promote movement  GI ppx: Pantoprazole 40 mg daily  Diet: NPO with ice and water, advance as tolerated. DVT ppx: SCDs, promote movement  GI ppx: Pantoprazole 40 mg daily, Pepcis 40 mg at bedtime as per home regimen.   Diet: NPO with ice and water, advance as tolerated.

## 2023-07-26 NOTE — PROGRESS NOTE ADULT - PROBLEM SELECTOR PLAN 1
- symptoms began 1 day s/p ERCP (07/24), likely induced to recent procedure  - Dr. Park, GI, is following. Recs appreciated  - Follow up infectious workup; blood cultures, UA, urine culture  - Trend CBC, BMP, coags.   - Zofran 4 mg q 8 hours PRN for nausea   - Pain management:   Mild pain: Tylenol 650 mg PO PRN q 6 hours    Moderate pain: Dilaudid 1 mg IV PRN q 3 hours    Severe pain: Dilaudid 2 mg IV PRN q 3 hours   - IVF hydration; NS at 100 cc/hour standing.  - advance diet as tolerated - symptoms began 1 day s/p ERCP (07/24), likely induced to recent procedure  - Dr. Park, GI, is following. Recs appreciated  - Follow up infectious workup; blood cultures, UA, urine culture  - Trend CBC, BMP, coags.   - Zofran 4 mg q 8 hours PRN for nausea   - Pain management:   Mild pain: Tylenol 650 mg PO PRN q 6 hours    Moderate pain: Dilaudid 1 mg IV PRN q 3 hours    Severe pain: Dilaudid 2 mg IV PRN q 3 hours   - IVF hydration; NS at 125 cc/hour standing.  - Incentive spirometry  - Encouraged PO intake   - advance diet as tolerated

## 2023-07-27 DIAGNOSIS — R09.02 HYPOXEMIA: ICD-10-CM

## 2023-07-27 DIAGNOSIS — R00.0 TACHYCARDIA, UNSPECIFIED: ICD-10-CM

## 2023-07-27 DIAGNOSIS — N39.0 URINARY TRACT INFECTION, SITE NOT SPECIFIED: ICD-10-CM

## 2023-07-27 DIAGNOSIS — K59.00 CONSTIPATION, UNSPECIFIED: ICD-10-CM

## 2023-07-27 LAB
ALBUMIN SERPL ELPH-MCNC: 3.1 G/DL — LOW (ref 3.3–5)
ALP SERPL-CCNC: 61 U/L — SIGNIFICANT CHANGE UP (ref 40–120)
ALT FLD-CCNC: 46 U/L — HIGH (ref 4–33)
ANION GAP SERPL CALC-SCNC: 14 MMOL/L — SIGNIFICANT CHANGE UP (ref 7–14)
AST SERPL-CCNC: 33 U/L — HIGH (ref 4–32)
BASOPHILS # BLD AUTO: 0.05 K/UL — SIGNIFICANT CHANGE UP (ref 0–0.2)
BASOPHILS NFR BLD AUTO: 0.3 % — SIGNIFICANT CHANGE UP (ref 0–2)
BILIRUB SERPL-MCNC: 1.2 MG/DL — SIGNIFICANT CHANGE UP (ref 0.2–1.2)
BUN SERPL-MCNC: 20 MG/DL — SIGNIFICANT CHANGE UP (ref 7–23)
CALCIUM SERPL-MCNC: 7.4 MG/DL — LOW (ref 8.4–10.5)
CHLORIDE SERPL-SCNC: 96 MMOL/L — LOW (ref 98–107)
CO2 SERPL-SCNC: 20 MMOL/L — LOW (ref 22–31)
CREAT SERPL-MCNC: 0.84 MG/DL — SIGNIFICANT CHANGE UP (ref 0.5–1.3)
CULTURE RESULTS: SIGNIFICANT CHANGE UP
EGFR: 80 ML/MIN/1.73M2 — SIGNIFICANT CHANGE UP
EOSINOPHIL # BLD AUTO: 0 K/UL — SIGNIFICANT CHANGE UP (ref 0–0.5)
EOSINOPHIL NFR BLD AUTO: 0 % — SIGNIFICANT CHANGE UP (ref 0–6)
GLUCOSE SERPL-MCNC: 96 MG/DL — SIGNIFICANT CHANGE UP (ref 70–99)
HCT VFR BLD CALC: 45 % — SIGNIFICANT CHANGE UP (ref 34.5–45)
HGB BLD-MCNC: 15.1 G/DL — SIGNIFICANT CHANGE UP (ref 11.5–15.5)
IANC: 15.46 K/UL — HIGH (ref 1.8–7.4)
IMM GRANULOCYTES NFR BLD AUTO: 0.5 % — SIGNIFICANT CHANGE UP (ref 0–0.9)
LYMPHOCYTES # BLD AUTO: 0.97 K/UL — LOW (ref 1–3.3)
LYMPHOCYTES # BLD AUTO: 5.5 % — LOW (ref 13–44)
MAGNESIUM SERPL-MCNC: 1.4 MG/DL — LOW (ref 1.6–2.6)
MCHC RBC-ENTMCNC: 30.3 PG — SIGNIFICANT CHANGE UP (ref 27–34)
MCHC RBC-ENTMCNC: 33.6 GM/DL — SIGNIFICANT CHANGE UP (ref 32–36)
MCV RBC AUTO: 90.2 FL — SIGNIFICANT CHANGE UP (ref 80–100)
MONOCYTES # BLD AUTO: 1.13 K/UL — HIGH (ref 0–0.9)
MONOCYTES NFR BLD AUTO: 6.4 % — SIGNIFICANT CHANGE UP (ref 2–14)
NEUTROPHILS # BLD AUTO: 15.46 K/UL — HIGH (ref 1.8–7.4)
NEUTROPHILS NFR BLD AUTO: 87.3 % — HIGH (ref 43–77)
NRBC # BLD: 0 /100 WBCS — SIGNIFICANT CHANGE UP (ref 0–0)
NRBC # FLD: 0 K/UL — SIGNIFICANT CHANGE UP (ref 0–0)
OSMOLALITY UR: 895 MOSM/KG — SIGNIFICANT CHANGE UP (ref 50–1200)
PHOSPHATE SERPL-MCNC: 2.1 MG/DL — LOW (ref 2.5–4.5)
PLATELET # BLD AUTO: 182 K/UL — SIGNIFICANT CHANGE UP (ref 150–400)
POTASSIUM SERPL-MCNC: 4.3 MMOL/L — SIGNIFICANT CHANGE UP (ref 3.5–5.3)
POTASSIUM SERPL-SCNC: 4.3 MMOL/L — SIGNIFICANT CHANGE UP (ref 3.5–5.3)
PROT SERPL-MCNC: 5.9 G/DL — LOW (ref 6–8.3)
RBC # BLD: 4.99 M/UL — SIGNIFICANT CHANGE UP (ref 3.8–5.2)
RBC # FLD: 12.9 % — SIGNIFICANT CHANGE UP (ref 10.3–14.5)
SODIUM SERPL-SCNC: 130 MMOL/L — LOW (ref 135–145)
SODIUM UR-SCNC: 29 MMOL/L — SIGNIFICANT CHANGE UP
SPECIMEN SOURCE: SIGNIFICANT CHANGE UP
WBC # BLD: 17.69 K/UL — HIGH (ref 3.8–10.5)
WBC # FLD AUTO: 17.69 K/UL — HIGH (ref 3.8–10.5)

## 2023-07-27 PROCEDURE — 74018 RADEX ABDOMEN 1 VIEW: CPT | Mod: 26

## 2023-07-27 PROCEDURE — 99232 SBSQ HOSP IP/OBS MODERATE 35: CPT | Mod: GC

## 2023-07-27 RX ORDER — HYDROMORPHONE HYDROCHLORIDE 2 MG/ML
1 INJECTION INTRAMUSCULAR; INTRAVENOUS; SUBCUTANEOUS
Refills: 0 | Status: DISCONTINUED | OUTPATIENT
Start: 2023-07-27 | End: 2023-07-31

## 2023-07-27 RX ORDER — HYDROMORPHONE HYDROCHLORIDE 2 MG/ML
2 INJECTION INTRAMUSCULAR; INTRAVENOUS; SUBCUTANEOUS
Refills: 0 | Status: DISCONTINUED | OUTPATIENT
Start: 2023-07-27 | End: 2023-07-31

## 2023-07-27 RX ORDER — MAGNESIUM SULFATE 500 MG/ML
2 VIAL (ML) INJECTION ONCE
Refills: 0 | Status: COMPLETED | OUTPATIENT
Start: 2023-07-27 | End: 2023-07-27

## 2023-07-27 RX ORDER — PHENAZOPYRIDINE HCL 100 MG
100 TABLET ORAL THREE TIMES A DAY
Refills: 0 | Status: COMPLETED | OUTPATIENT
Start: 2023-07-27 | End: 2023-07-28

## 2023-07-27 RX ORDER — SODIUM,POTASSIUM PHOSPHATES 278-250MG
1 POWDER IN PACKET (EA) ORAL ONCE
Refills: 0 | Status: COMPLETED | OUTPATIENT
Start: 2023-07-27 | End: 2023-07-27

## 2023-07-27 RX ORDER — POLYETHYLENE GLYCOL 3350 17 G/17G
17 POWDER, FOR SOLUTION ORAL DAILY
Refills: 0 | Status: DISCONTINUED | OUTPATIENT
Start: 2023-07-27 | End: 2023-08-04

## 2023-07-27 RX ORDER — SENNA PLUS 8.6 MG/1
1 TABLET ORAL AT BEDTIME
Refills: 0 | Status: DISCONTINUED | OUTPATIENT
Start: 2023-07-27 | End: 2023-08-02

## 2023-07-27 RX ADMIN — HYDROMORPHONE HYDROCHLORIDE 2 MILLIGRAM(S): 2 INJECTION INTRAMUSCULAR; INTRAVENOUS; SUBCUTANEOUS at 05:32

## 2023-07-27 RX ADMIN — SODIUM CHLORIDE 125 MILLILITER(S): 9 INJECTION, SOLUTION INTRAVENOUS at 18:35

## 2023-07-27 RX ADMIN — Medication 100 MILLIGRAM(S): at 05:33

## 2023-07-27 RX ADMIN — SODIUM CHLORIDE 125 MILLILITER(S): 9 INJECTION, SOLUTION INTRAVENOUS at 11:09

## 2023-07-27 RX ADMIN — HYDROMORPHONE HYDROCHLORIDE 2 MILLIGRAM(S): 2 INJECTION INTRAMUSCULAR; INTRAVENOUS; SUBCUTANEOUS at 21:20

## 2023-07-27 RX ADMIN — HYDROMORPHONE HYDROCHLORIDE 2 MILLIGRAM(S): 2 INJECTION INTRAMUSCULAR; INTRAVENOUS; SUBCUTANEOUS at 16:35

## 2023-07-27 RX ADMIN — AMLODIPINE BESYLATE 5 MILLIGRAM(S): 2.5 TABLET ORAL at 05:33

## 2023-07-27 RX ADMIN — HYDROMORPHONE HYDROCHLORIDE 2 MILLIGRAM(S): 2 INJECTION INTRAMUSCULAR; INTRAVENOUS; SUBCUTANEOUS at 20:31

## 2023-07-27 RX ADMIN — CEFTRIAXONE 100 MILLIGRAM(S): 500 INJECTION, POWDER, FOR SOLUTION INTRAMUSCULAR; INTRAVENOUS at 18:35

## 2023-07-27 RX ADMIN — Medication 650 MILLIGRAM(S): at 01:02

## 2023-07-27 RX ADMIN — SIMETHICONE 80 MILLIGRAM(S): 80 TABLET, CHEWABLE ORAL at 11:06

## 2023-07-27 RX ADMIN — Medication 650 MILLIGRAM(S): at 02:02

## 2023-07-27 RX ADMIN — Medication 1 PACKET(S): at 16:17

## 2023-07-27 RX ADMIN — Medication 100 MILLIGRAM(S): at 21:49

## 2023-07-27 RX ADMIN — HYDROMORPHONE HYDROCHLORIDE 2 MILLIGRAM(S): 2 INJECTION INTRAMUSCULAR; INTRAVENOUS; SUBCUTANEOUS at 11:21

## 2023-07-27 RX ADMIN — PANTOPRAZOLE SODIUM 40 MILLIGRAM(S): 20 TABLET, DELAYED RELEASE ORAL at 11:07

## 2023-07-27 RX ADMIN — Medication 10 MILLIGRAM(S): at 12:45

## 2023-07-27 RX ADMIN — HYDROMORPHONE HYDROCHLORIDE 2 MILLIGRAM(S): 2 INJECTION INTRAMUSCULAR; INTRAVENOUS; SUBCUTANEOUS at 06:02

## 2023-07-27 RX ADMIN — HYDROMORPHONE HYDROCHLORIDE 2 MILLIGRAM(S): 2 INJECTION INTRAMUSCULAR; INTRAVENOUS; SUBCUTANEOUS at 16:18

## 2023-07-27 RX ADMIN — Medication 100 MILLIGRAM(S): at 13:27

## 2023-07-27 RX ADMIN — HYDROMORPHONE HYDROCHLORIDE 2 MILLIGRAM(S): 2 INJECTION INTRAMUSCULAR; INTRAVENOUS; SUBCUTANEOUS at 11:06

## 2023-07-27 RX ADMIN — SENNA PLUS 1 TABLET(S): 8.6 TABLET ORAL at 21:50

## 2023-07-27 RX ADMIN — Medication 25 GRAM(S): at 11:05

## 2023-07-27 RX ADMIN — FAMOTIDINE 20 MILLIGRAM(S): 10 INJECTION INTRAVENOUS at 21:50

## 2023-07-27 RX ADMIN — SENNA PLUS 1 TABLET(S): 8.6 TABLET ORAL at 11:07

## 2023-07-27 NOTE — PROGRESS NOTE ADULT - PROBLEM SELECTOR PLAN 3
DVT ppx: SCDs, promote movement  GI ppx: Pantoprazole 40 mg daily, Pepcis 40 mg at bedtime as per home regimen.   Diet: NPO with ice and water, advance as tolerated. - Noted to have O2 sat of 89-92% on room air  - Patient has thick nail polish, moved pulse ox reader to earlobe, O2 sat improved to 92%  - Instructed patient to get up and move to chair from bed. O2 improved to 96-98%  - Transient hypoxia likely a result of positioning/splinting.   - Denies shortness of breath.

## 2023-07-27 NOTE — PROGRESS NOTE ADULT - PROBLEM SELECTOR PLAN 2
- Continue home Amlodipine 5 mg PO daily - Noted to have HRs up to 130   - EKG shows ST @ 126 BPM, normal axis, no acute ST-T changes, QTc within normal limits on (07/26)  - Likely due to pain, patient avoided Dilaudid last night (07/26) due to concerns of possible urinary retention as a side effect of Dilaudid.   - HR of 97 this morning (07/27)

## 2023-07-27 NOTE — PROGRESS NOTE ADULT - PROBLEM SELECTOR PLAN 7
DVT ppx: SCDs, promote movement  GI ppx: Pantoprazole 40 mg daily, Pepcid 20 mg at bedtime .   Diet: NPO with ice and water, advance as tolerated.

## 2023-07-27 NOTE — PROGRESS NOTE ADULT - PROBLEM SELECTOR PLAN 1
- symptoms began 1 day s/p ERCP (07/24), likely induced to recent procedure  - Dr. Park, GI, is following. Recs appreciated  - Follow up infectious workup; blood cultures, UA, urine culture  - Trend CBC, BMP, coags.   - Zofran 4 mg q 8 hours PRN for nausea   - Pain management:   Mild pain: Tylenol 650 mg PO PRN q 6 hours    Moderate pain: Dilaudid 1 mg IV PRN q 3 hours    Severe pain: Dilaudid 2 mg IV PRN q 3 hours   - IVF hydration; NS at 125 cc/hour standing.  - Incentive spirometry  - Encouraged PO intake   - advance diet as tolerated - symptoms began 1 day s/p ERCP (07/24), likely induced to recent procedure  - Dr. Park, GI, is following. Recs appreciated  - Follow up infectious workup; blood cultures, UA, urine culture  - Trend CBC, BMP, coags.   - Zofran 4 mg q 8 hours PRN for nausea   - Prochlorperazine 5 mg IV q4 hours PRN for nausea  - Pain management:   Mild pain: Tylenol 650 mg PO PRN q 6 hours    Moderate pain: Dilaudid 1 mg IV PRN q 3 hours    Severe pain: Dilaudid 2 mg IV PRN q 3 hours   - IVF hydration; NS at 125 cc/hour standing.  - Incentive spirometry  - Encouraged PO intake   - advance diet as tolerated

## 2023-07-27 NOTE — PROGRESS NOTE ADULT - SUBJECTIVE AND OBJECTIVE BOX
PROGRESS NOTE:   Authored by Jose Fong MD  Internal Medicine      Patient is a 58y old  Female who presents with a chief complaint of Severe abdominal pain s/p ERCP yesterday (27 Jul 2023 07:02)      SUBJECTIVE / OVERNIGHT EVENTS:   Overnight patient reported persistent abdominal pain, received Ofirmev.  Believes Dilaudid may be causing urinary retention.  Patient seen and examined at bedside  Urinated ~200 ccs of orange urine. Bladder scan with 125-160cc residual urine.  States that pain is more so located to the right lower quadrant today. Reports sensation of suprapubic fullness.  Due to sensation of fullness, attempting to urinate multiple times throughout the day/night with small urine output.  Endorses constipation, has not passed flatus nor stool since admission.    Otherwise no chest pain, fever, chills, shortness of breath.       MEDICATIONS  (STANDING):  aluminum hydroxide/magnesium hydroxide/simethicone Suspension 30 milliLiter(s) Oral every 4 hours  amLODIPine   Tablet 5 milliGRAM(s) Oral daily  cefTRIAXone   IVPB 1000 milliGRAM(s) IV Intermittent every 24 hours  cefTRIAXone   IVPB      famotidine    Tablet 20 milliGRAM(s) Oral at bedtime  lactated ringers. 1000 milliLiter(s) (125 mL/Hr) IV Continuous <Continuous>  pantoprazole  Injectable      pantoprazole  Injectable 40 milliGRAM(s) IV Push daily  phenazopyridine 100 milliGRAM(s) Oral three times a day  potassium phosphate / sodium phosphate Powder (PHOS-NaK) 1 Packet(s) Oral once  senna 1 Tablet(s) Oral at bedtime    MEDICATIONS  (PRN):  acetaminophen     Tablet .. 650 milliGRAM(s) Oral every 6 hours PRN Temp greater or equal to 38C (100.4F), Mild Pain (1 - 3)  bisacodyl Suppository 10 milliGRAM(s) Rectal daily PRN Constipation  HYDROmorphone  Injectable 1 milliGRAM(s) IV Push every 3 hours PRN Moderate Pain (4 - 6)  HYDROmorphone  Injectable 2 milliGRAM(s) IV Push every 3 hours PRN Severe Pain (7 - 10)  melatonin 3 milliGRAM(s) Oral at bedtime PRN Insomnia  ondansetron Injectable 4 milliGRAM(s) IV Push every 8 hours PRN Nausea and/or Vomiting  prochlorperazine   Injectable 5 milliGRAM(s) IV Push every 4 hours PRN Nausea & Vomiting  simethicone 80 milliGRAM(s) Chew every 6 hours PRN Gas      CAPILLARY BLOOD GLUCOSE        I&O's Summary    26 Jul 2023 07:01  -  27 Jul 2023 07:00  --------------------------------------------------------  IN: 2620 mL / OUT: 500 mL / NET: 2120 mL    27 Jul 2023 07:01  -  27 Jul 2023 12:05  --------------------------------------------------------  IN: 550 mL / OUT: 250 mL / NET: 300 mL        PHYSICAL EXAM:  Vital Signs Last 24 Hrs  T(C): 36.7 (27 Jul 2023 10:02), Max: 37.1 (27 Jul 2023 01:17)  T(F): 98 (27 Jul 2023 10:02), Max: 98.7 (27 Jul 2023 01:17)  HR: 116 (27 Jul 2023 10:02) (97 - 136)  BP: 118/78 (27 Jul 2023 10:02) (107/78 - 139/84)  BP(mean): --  RR: 18 (27 Jul 2023 10:02) (17 - 18)  SpO2: 92% (27 Jul 2023 10:02) (86% - 98%)    Parameters below as of 27 Jul 2023 10:02  Patient On (Oxygen Delivery Method): room air      CONSTITUTIONAL: Well-groomed, in no apparent distress  EYES: No conjunctival or scleral injection, non-icteric;   ENMT: No external nasal lesions; MMM  NECK: Trachea midline without palpable neck mass; thyroid not enlarged and non-tender  RESPIRATORY: Breathing comfortably; no dullness to percussion; lungs CTA without wheeze/rhonchi/rales  CARDIOVASCULAR: +S1S2, RRR, no M/G/R; pedal pulses full and symmetric; no lower extremity edema  GASTROINTESTINAL: No palpable masses or tenderness, +BS throughout, no rebound/guarding; no hepatosplenomegaly; no hernia palpated  LYMPHATIC: No cervical LAD or tenderness  SKIN: No rashes or ulcers noted  NEUROLOGIC: CN II-XII intact; sensation intact in LEs b/l to light touch  PSYCHIATRIC: A+O x 3; mood and affect appropriate; appropriate insight and judgment    LABS:                        15.1   17.69 )-----------( 182      ( 27 Jul 2023 05:31 )             45.0     07-27    130<L>  |  96<L>  |  20  ----------------------------<  96  4.3   |  20<L>  |  0.84    Ca    7.4<L>      27 Jul 2023 05:31  Phos  2.1     07-27  Mg     1.40     07-27    TPro  5.9<L>  /  Alb  3.1<L>  /  TBili  1.2  /  DBili  x   /  AST  33<H>  /  ALT  46<H>  /  AlkPhos  61  07-27    PT/INR - ( 26 Jul 2023 06:15 )   PT: 12.3 sec;   INR: 1.09 ratio         PTT - ( 26 Jul 2023 06:15 )  PTT:26.1 sec      Urinalysis Basic - ( 27 Jul 2023 05:31 )    Color: x / Appearance: x / SG: x / pH: x  Gluc: 96 mg/dL / Ketone: x  / Bili: x / Urobili: x   Blood: x / Protein: x / Nitrite: x   Leuk Esterase: x / RBC: x / WBC x   Sq Epi: x / Non Sq Epi: x / Bacteria: x        Culture - Blood (collected 25 Jul 2023 16:20)  Source: .Blood Blood-Peripheral  Preliminary Report (26 Jul 2023 20:01):    No growth at 24 hours        RADIOLOGY & ADDITIONAL TESTS:  Results Reviewed:   Imaging Personally Reviewed:  Electrocardiogram Personally Reviewed:    COORDINATION OF CARE:  Care Discussed with Consultants/Other Providers [Y/N]:  Prior or Outpatient Records Reviewed [Y/N]:   PROGRESS NOTE:   Authored by Jose Fong MD  Internal Medicine    Patient is a 58y old  Female who presents with a chief complaint of Severe abdominal pain s/p ERCP yesterday (27 Jul 2023 07:02)    SUBJECTIVE / OVERNIGHT EVENTS:   Overnight patient reported persistent abdominal pain, received Ofirmev.  Believes Dilaudid may be causing urinary retention.  Patient seen and examined at bedside  Urinated ~200 ccs of orange urine. Bladder scan with 125-160cc residual urine.  States that pain is more so located to the right lower quadrant today. Reports sensation of suprapubic fullness.  Due to sensation of fullness, attempting to urinate multiple times throughout the day/night with small urine output.  Endorses constipation, has not passed flatus nor stool since admission.    Otherwise no chest pain, fever, chills, shortness of breath.     MEDICATIONS  (STANDING):  aluminum hydroxide/magnesium hydroxide/simethicone Suspension 30 milliLiter(s) Oral every 4 hours  amLODIPine   Tablet 5 milliGRAM(s) Oral daily  cefTRIAXone   IVPB 1000 milliGRAM(s) IV Intermittent every 24 hours  cefTRIAXone   IVPB      famotidine    Tablet 20 milliGRAM(s) Oral at bedtime  lactated ringers. 1000 milliLiter(s) (125 mL/Hr) IV Continuous <Continuous>  pantoprazole  Injectable      pantoprazole  Injectable 40 milliGRAM(s) IV Push daily  phenazopyridine 100 milliGRAM(s) Oral three times a day  potassium phosphate / sodium phosphate Powder (PHOS-NaK) 1 Packet(s) Oral once  senna 1 Tablet(s) Oral at bedtime    MEDICATIONS  (PRN):  acetaminophen     Tablet .. 650 milliGRAM(s) Oral every 6 hours PRN Temp greater or equal to 38C (100.4F), Mild Pain (1 - 3)  bisacodyl Suppository 10 milliGRAM(s) Rectal daily PRN Constipation  HYDROmorphone  Injectable 1 milliGRAM(s) IV Push every 3 hours PRN Moderate Pain (4 - 6)  HYDROmorphone  Injectable 2 milliGRAM(s) IV Push every 3 hours PRN Severe Pain (7 - 10)  melatonin 3 milliGRAM(s) Oral at bedtime PRN Insomnia  ondansetron Injectable 4 milliGRAM(s) IV Push every 8 hours PRN Nausea and/or Vomiting  prochlorperazine   Injectable 5 milliGRAM(s) IV Push every 4 hours PRN Nausea & Vomiting  simethicone 80 milliGRAM(s) Chew every 6 hours PRN Gas      CAPILLARY BLOOD GLUCOSE        I&O's Summary    26 Jul 2023 07:01  -  27 Jul 2023 07:00  --------------------------------------------------------  IN: 2620 mL / OUT: 500 mL / NET: 2120 mL    27 Jul 2023 07:01  -  27 Jul 2023 12:05  --------------------------------------------------------  IN: 550 mL / OUT: 250 mL / NET: 300 mL        PHYSICAL EXAM:  Vital Signs Last 24 Hrs  T(C): 36.7 (27 Jul 2023 10:02), Max: 37.1 (27 Jul 2023 01:17)  T(F): 98 (27 Jul 2023 10:02), Max: 98.7 (27 Jul 2023 01:17)  HR: 116 (27 Jul 2023 10:02) (97 - 136)  BP: 118/78 (27 Jul 2023 10:02) (107/78 - 139/84)  BP(mean): --  RR: 18 (27 Jul 2023 10:02) (17 - 18)  SpO2: 92% (27 Jul 2023 10:02) (86% - 98%)    Parameters below as of 27 Jul 2023 10:02  Patient On (Oxygen Delivery Method): room air    General: Patient is alert, awake, and in moderate distress.  HEENT: Sclera anicteric.   Cardiovascular: S1S2 intact. No murmurs, gallops, rubs.   Pulm: Lungs clear to auscultation. No increased work of breathing.  Abdomen: Distended Diffuse abdominal tenderness, more so over the right lower quadrant and suprapubic region. Minimal bowel sounds.   Extremities: No edema to the extremities.    LABS:                        15.1   17.69 )-----------( 182      ( 27 Jul 2023 05:31 )             45.0     07-27    130<L>  |  96<L>  |  20  ----------------------------<  96  4.3   |  20<L>  |  0.84    Ca    7.4<L>      27 Jul 2023 05:31  Phos  2.1     07-27  Mg     1.40     07-27    TPro  5.9<L>  /  Alb  3.1<L>  /  TBili  1.2  /  DBili  x   /  AST  33<H>  /  ALT  46<H>  /  AlkPhos  61  07-27    PT/INR - ( 26 Jul 2023 06:15 )   PT: 12.3 sec;   INR: 1.09 ratio         PTT - ( 26 Jul 2023 06:15 )  PTT:26.1 sec      Urinalysis Basic - ( 27 Jul 2023 05:31 )    Color: x / Appearance: x / SG: x / pH: x  Gluc: 96 mg/dL / Ketone: x  / Bili: x / Urobili: x   Blood: x / Protein: x / Nitrite: x   Leuk Esterase: x / RBC: x / WBC x   Sq Epi: x / Non Sq Epi: x / Bacteria: x        Culture - Blood (collected 25 Jul 2023 16:20)  Source: .Blood Blood-Peripheral  Preliminary Report (26 Jul 2023 20:01):    No growth at 24 hours    COORDINATION OF CARE:  Care Discussed with Consultants/Other Providers [Y/N]: Yes, GI  Prior or Outpatient Records Reviewed [Y/N]: Yes

## 2023-07-27 NOTE — PROGRESS NOTE ADULT - PROBLEM SELECTOR PLAN 5
- Has not passed flatus nor stool since admission  - Standing Miralax and Senna  - PRN Simethicone, Maalox, Dulcolax suppository

## 2023-07-27 NOTE — PROVIDER CONTACT NOTE (OTHER) - BACKGROUND
58 year old female with history of HTN, GERD, cholecystectomy (2013), presents with acute severe pancreatitis s/p ERCP on 07/24/2023.
58 year old female with history of HTN, GERD, cholecystectomy (2013), presents with acute severe pancreatitis s/p ERCP on 07/24/2023.
Pt is S/P ERCP  Adm for abd pain, nausea
patient admitted for acute pancreatitis, received hydromorphone this morning. States 1 mg hydromorphone doesn't work

## 2023-07-27 NOTE — PROGRESS NOTE ADULT - SUBJECTIVE AND OBJECTIVE BOX
Interval Events:   No acute events overnight.  Patient without vomiting overnight however with continued abdominal pain.    ROS:   12 point review of systems performed and negative except otherwise noted in HPI.    Hospital Medications:  acetaminophen     Tablet .. 650 milliGRAM(s) Oral every 6 hours PRN  aluminum hydroxide/magnesium hydroxide/simethicone Suspension 30 milliLiter(s) Oral every 4 hours PRN  amLODIPine   Tablet 5 milliGRAM(s) Oral daily  bisacodyl Suppository 10 milliGRAM(s) Rectal daily PRN  cefTRIAXone   IVPB 1000 milliGRAM(s) IV Intermittent every 24 hours  cefTRIAXone   IVPB      famotidine    Tablet 20 milliGRAM(s) Oral at bedtime  HYDROmorphone  Injectable 1 milliGRAM(s) IV Push every 3 hours PRN  HYDROmorphone  Injectable 2 milliGRAM(s) IV Push every 3 hours PRN  lactated ringers. 1000 milliLiter(s) IV Continuous <Continuous>  melatonin 3 milliGRAM(s) Oral at bedtime PRN  ondansetron Injectable 4 milliGRAM(s) IV Push every 8 hours PRN  pantoprazole  Injectable 40 milliGRAM(s) IV Push daily  pantoprazole  Injectable      phenazopyridine 100 milliGRAM(s) Oral three times a day  polyethylene glycol 3350 17 Gram(s) Oral daily  potassium phosphate / sodium phosphate Powder (PHOS-NaK) 1 Packet(s) Oral once  prochlorperazine   Injectable 5 milliGRAM(s) IV Push every 4 hours PRN  senna 1 Tablet(s) Oral at bedtime  simethicone 80 milliGRAM(s) Chew every 6 hours PRN      PHYSICAL EXAM:   Vital Signs:  Vital Signs Last 24 Hrs  T(C): 37 (27 Jul 2023 13:40), Max: 37.1 (27 Jul 2023 01:17)  T(F): 98.6 (27 Jul 2023 13:40), Max: 98.7 (27 Jul 2023 01:17)  HR: 120 (27 Jul 2023 13:40) (97 - 136)  BP: 125/68 (27 Jul 2023 13:40) (107/78 - 139/84)  BP(mean): --  RR: 18 (27 Jul 2023 13:40) (17 - 18)  SpO2: 92% (27 Jul 2023 13:40) (86% - 98%)    Parameters below as of 27 Jul 2023 13:40  Patient On (Oxygen Delivery Method): room air      Daily     Daily     GENERAL: no acute distress  NEURO: alert  HEENT: NCAT, no conjunctival pallor appreciated  CHEST: no respiratory distress, no accessory muscle use  CARDIAC: regular rate, +S1/S2  ABDOMEN: soft, epigastric tenderness to palpation, no rebound or guarding  EXTREMITIES: warm, well perfused  SKIN: no lesions noted    LABS: reviewed                        15.1   17.69 )-----------( 182      ( 27 Jul 2023 05:31 )             45.0     07-27    130<L>  |  96<L>  |  20  ----------------------------<  96  4.3   |  20<L>  |  0.84    Ca    7.4<L>      27 Jul 2023 05:31  Phos  2.1     07-27  Mg     1.40     07-27    TPro  5.9<L>  /  Alb  3.1<L>  /  TBili  1.2  /  DBili  x   /  AST  33<H>  /  ALT  46<H>  /  AlkPhos  61  07-27    LIVER FUNCTIONS - ( 27 Jul 2023 05:31 )  Alb: 3.1 g/dL / Pro: 5.9 g/dL / ALK PHOS: 61 U/L / ALT: 46 U/L / AST: 33 U/L / GGT: x             Interval Diagnostic Studies: see sunrise for full report

## 2023-07-27 NOTE — PROGRESS NOTE ADULT - ASSESSMENT
8 year old female with history of HTN, GERD, s/p cholecystectomy in 2013 presents with post ERCP pancreatitis, hospital course complicated by UTI and constipation. Noted to be tachycardic (130s HR) and hypoxic (89-92% on RA) on 07 likely due to splinting and pain.

## 2023-07-27 NOTE — PROGRESS NOTE ADULT - PROBLEM SELECTOR PLAN 4
- UA shows positive nitrates, few bacteria, leuk esterase, 7 WBCs  - Urine cultures pending.   - Ceftriaxone 1 g IV q daily - UA shows positive nitrates, few bacteria, leuk esterase, 7 WBCs  - Urine cultures pending.   - Ceftriaxone 1 g IV q daily  - Bladder scan of 120-160 cc today after ~200cc urine output.

## 2023-07-27 NOTE — PROGRESS NOTE ADULT - ASSESSMENT
58 year old female with history of HTN, GERD, and elevated liver chemistries who presents with abdominal pain after ERCP.    # Post-ERCP pancreatitis  -EGD/EUS/ERCP on 7/24/2023 that revealed 2cm hiatal hernia, copious CBD sludge, and CBD dilatation to ampulla, stenotic papilla, 5F x 6cm single pigtail PD stent, biliary sphincterotomy, and sludge/small fragments removed using balloon catheter  -upon admission, severe/sharp epigastric pain, lipase >3000, liver chemistries normal, CT A/P with severe acute interstitial edematous pancreatitis with extensive peripancreatic edema/fluid    Recommendations:  -trend clinical symptoms, exam findings, vital signs, CBC, CMP, INR  -complete infectious workup and f/u BCx  -aggressive IVF  -pain control and antiemetics as QTc allows  -UTI workup/management per primary team    Note incomplete until finalized by attending signature/attestation.    Toan Murillo  GI/Hepatology Fellow    MONDAY-FRIDAY 8AM-5PM:  Pager# [phone] ([facility]) or [phone] ([facility])    NON-URGENT CONSULTS:  Please email [email or web address]  AT NIGHT AND ON WEEKENDS:  Contact on-call GI fellow via answering service ([phone]) from 5pm-8am and on weekends/holidays

## 2023-07-28 DIAGNOSIS — R35.0 FREQUENCY OF MICTURITION: ICD-10-CM

## 2023-07-28 LAB
ALBUMIN SERPL ELPH-MCNC: 2.4 G/DL — LOW (ref 3.3–5)
ALP SERPL-CCNC: 54 U/L — SIGNIFICANT CHANGE UP (ref 40–120)
ALT FLD-CCNC: 26 U/L — SIGNIFICANT CHANGE UP (ref 4–33)
ANION GAP SERPL CALC-SCNC: 10 MMOL/L — SIGNIFICANT CHANGE UP (ref 7–14)
ANISOCYTOSIS BLD QL: SLIGHT — SIGNIFICANT CHANGE UP
AST SERPL-CCNC: 21 U/L — SIGNIFICANT CHANGE UP (ref 4–32)
BASOPHILS # BLD AUTO: 0 K/UL — SIGNIFICANT CHANGE UP (ref 0–0.2)
BASOPHILS NFR BLD AUTO: 0 % — SIGNIFICANT CHANGE UP (ref 0–2)
BILIRUB SERPL-MCNC: 0.8 MG/DL — SIGNIFICANT CHANGE UP (ref 0.2–1.2)
BUN SERPL-MCNC: 11 MG/DL — SIGNIFICANT CHANGE UP (ref 7–23)
CALCIUM SERPL-MCNC: 7.2 MG/DL — LOW (ref 8.4–10.5)
CHLORIDE SERPL-SCNC: 98 MMOL/L — SIGNIFICANT CHANGE UP (ref 98–107)
CO2 SERPL-SCNC: 23 MMOL/L — SIGNIFICANT CHANGE UP (ref 22–31)
CREAT SERPL-MCNC: 0.59 MG/DL — SIGNIFICANT CHANGE UP (ref 0.5–1.3)
DACRYOCYTES BLD QL SMEAR: SLIGHT — SIGNIFICANT CHANGE UP
EGFR: 104 ML/MIN/1.73M2 — SIGNIFICANT CHANGE UP
EOSINOPHIL # BLD AUTO: 0 K/UL — SIGNIFICANT CHANGE UP (ref 0–0.5)
EOSINOPHIL NFR BLD AUTO: 0 % — SIGNIFICANT CHANGE UP (ref 0–6)
GLUCOSE SERPL-MCNC: 76 MG/DL — SIGNIFICANT CHANGE UP (ref 70–99)
HCT VFR BLD CALC: 35.3 % — SIGNIFICANT CHANGE UP (ref 34.5–45)
HGB BLD-MCNC: 11.9 G/DL — SIGNIFICANT CHANGE UP (ref 11.5–15.5)
IANC: 9.78 K/UL — HIGH (ref 1.8–7.4)
LYMPHOCYTES # BLD AUTO: 0.59 K/UL — LOW (ref 1–3.3)
LYMPHOCYTES # BLD AUTO: 5.2 % — LOW (ref 13–44)
MACROCYTES BLD QL: SLIGHT — SIGNIFICANT CHANGE UP
MAGNESIUM SERPL-MCNC: 1.6 MG/DL — SIGNIFICANT CHANGE UP (ref 1.6–2.6)
MANUAL SMEAR VERIFICATION: SIGNIFICANT CHANGE UP
MCHC RBC-ENTMCNC: 31.2 PG — SIGNIFICANT CHANGE UP (ref 27–34)
MCHC RBC-ENTMCNC: 33.7 GM/DL — SIGNIFICANT CHANGE UP (ref 32–36)
MCV RBC AUTO: 92.4 FL — SIGNIFICANT CHANGE UP (ref 80–100)
MICROCYTES BLD QL: SLIGHT — SIGNIFICANT CHANGE UP
MONOCYTES # BLD AUTO: 0.69 K/UL — SIGNIFICANT CHANGE UP (ref 0–0.9)
MONOCYTES NFR BLD AUTO: 6.1 % — SIGNIFICANT CHANGE UP (ref 2–14)
NEUTROPHILS # BLD AUTO: 9.97 K/UL — HIGH (ref 1.8–7.4)
NEUTROPHILS NFR BLD AUTO: 66.1 % — SIGNIFICANT CHANGE UP (ref 43–77)
NEUTS BAND # BLD: 21.7 % — CRITICAL HIGH (ref 0–6)
OVALOCYTES BLD QL SMEAR: SLIGHT — SIGNIFICANT CHANGE UP
PHOSPHATE SERPL-MCNC: 1.3 MG/DL — LOW (ref 2.5–4.5)
PLAT MORPH BLD: NORMAL — SIGNIFICANT CHANGE UP
PLATELET # BLD AUTO: 157 K/UL — SIGNIFICANT CHANGE UP (ref 150–400)
PLATELET COUNT - ESTIMATE: NORMAL — SIGNIFICANT CHANGE UP
POIKILOCYTOSIS BLD QL AUTO: SLIGHT — SIGNIFICANT CHANGE UP
POLYCHROMASIA BLD QL SMEAR: SLIGHT — SIGNIFICANT CHANGE UP
POTASSIUM SERPL-MCNC: 4.5 MMOL/L — SIGNIFICANT CHANGE UP (ref 3.5–5.3)
POTASSIUM SERPL-SCNC: 4.5 MMOL/L — SIGNIFICANT CHANGE UP (ref 3.5–5.3)
PROT SERPL-MCNC: 4.9 G/DL — LOW (ref 6–8.3)
RBC # BLD: 3.82 M/UL — SIGNIFICANT CHANGE UP (ref 3.8–5.2)
RBC # FLD: 13 % — SIGNIFICANT CHANGE UP (ref 10.3–14.5)
RBC BLD AUTO: ABNORMAL
SODIUM SERPL-SCNC: 131 MMOL/L — LOW (ref 135–145)
VARIANT LYMPHS # BLD: 0.9 % — SIGNIFICANT CHANGE UP (ref 0–6)
WBC # BLD: 11.36 K/UL — HIGH (ref 3.8–10.5)
WBC # FLD AUTO: 11.36 K/UL — HIGH (ref 3.8–10.5)

## 2023-07-28 PROCEDURE — 99233 SBSQ HOSP IP/OBS HIGH 50: CPT | Mod: GC

## 2023-07-28 PROCEDURE — 99232 SBSQ HOSP IP/OBS MODERATE 35: CPT | Mod: GC

## 2023-07-28 PROCEDURE — 93970 EXTREMITY STUDY: CPT | Mod: 26

## 2023-07-28 PROCEDURE — 71045 X-RAY EXAM CHEST 1 VIEW: CPT | Mod: 26

## 2023-07-28 RX ORDER — PIPERACILLIN AND TAZOBACTAM 4; .5 G/20ML; G/20ML
3.38 INJECTION, POWDER, LYOPHILIZED, FOR SOLUTION INTRAVENOUS ONCE
Refills: 0 | Status: COMPLETED | OUTPATIENT
Start: 2023-07-29 | End: 2023-07-28

## 2023-07-28 RX ORDER — PIPERACILLIN AND TAZOBACTAM 4; .5 G/20ML; G/20ML
3.38 INJECTION, POWDER, LYOPHILIZED, FOR SOLUTION INTRAVENOUS ONCE
Refills: 0 | Status: COMPLETED | OUTPATIENT
Start: 2023-07-28 | End: 2023-07-28

## 2023-07-28 RX ORDER — PIPERACILLIN AND TAZOBACTAM 4; .5 G/20ML; G/20ML
3.38 INJECTION, POWDER, LYOPHILIZED, FOR SOLUTION INTRAVENOUS EVERY 8 HOURS
Refills: 0 | Status: DISCONTINUED | OUTPATIENT
Start: 2023-07-29 | End: 2023-07-31

## 2023-07-28 RX ADMIN — HYDROMORPHONE HYDROCHLORIDE 2 MILLIGRAM(S): 2 INJECTION INTRAMUSCULAR; INTRAVENOUS; SUBCUTANEOUS at 23:24

## 2023-07-28 RX ADMIN — Medication 100 MILLIGRAM(S): at 05:38

## 2023-07-28 RX ADMIN — Medication 100 MILLIGRAM(S): at 14:23

## 2023-07-28 RX ADMIN — Medication 85 MILLIMOLE(S): at 08:30

## 2023-07-28 RX ADMIN — PIPERACILLIN AND TAZOBACTAM 25 GRAM(S): 4; .5 INJECTION, POWDER, LYOPHILIZED, FOR SOLUTION INTRAVENOUS at 19:22

## 2023-07-28 RX ADMIN — HYDROMORPHONE HYDROCHLORIDE 2 MILLIGRAM(S): 2 INJECTION INTRAMUSCULAR; INTRAVENOUS; SUBCUTANEOUS at 13:27

## 2023-07-28 RX ADMIN — HYDROMORPHONE HYDROCHLORIDE 2 MILLIGRAM(S): 2 INJECTION INTRAMUSCULAR; INTRAVENOUS; SUBCUTANEOUS at 23:54

## 2023-07-28 RX ADMIN — HYDROMORPHONE HYDROCHLORIDE 2 MILLIGRAM(S): 2 INJECTION INTRAMUSCULAR; INTRAVENOUS; SUBCUTANEOUS at 16:52

## 2023-07-28 RX ADMIN — SODIUM CHLORIDE 125 MILLILITER(S): 9 INJECTION, SOLUTION INTRAVENOUS at 08:02

## 2023-07-28 RX ADMIN — HYDROMORPHONE HYDROCHLORIDE 2 MILLIGRAM(S): 2 INJECTION INTRAMUSCULAR; INTRAVENOUS; SUBCUTANEOUS at 17:22

## 2023-07-28 RX ADMIN — HYDROMORPHONE HYDROCHLORIDE 2 MILLIGRAM(S): 2 INJECTION INTRAMUSCULAR; INTRAVENOUS; SUBCUTANEOUS at 00:44

## 2023-07-28 RX ADMIN — HYDROMORPHONE HYDROCHLORIDE 2 MILLIGRAM(S): 2 INJECTION INTRAMUSCULAR; INTRAVENOUS; SUBCUTANEOUS at 20:17

## 2023-07-28 RX ADMIN — AMLODIPINE BESYLATE 5 MILLIGRAM(S): 2.5 TABLET ORAL at 05:39

## 2023-07-28 RX ADMIN — PIPERACILLIN AND TAZOBACTAM 200 GRAM(S): 4; .5 INJECTION, POWDER, LYOPHILIZED, FOR SOLUTION INTRAVENOUS at 14:22

## 2023-07-28 RX ADMIN — SODIUM CHLORIDE 125 MILLILITER(S): 9 INJECTION, SOLUTION INTRAVENOUS at 20:16

## 2023-07-28 RX ADMIN — HYDROMORPHONE HYDROCHLORIDE 2 MILLIGRAM(S): 2 INJECTION INTRAMUSCULAR; INTRAVENOUS; SUBCUTANEOUS at 04:20

## 2023-07-28 RX ADMIN — FAMOTIDINE 20 MILLIGRAM(S): 10 INJECTION INTRAVENOUS at 22:16

## 2023-07-28 RX ADMIN — HYDROMORPHONE HYDROCHLORIDE 2 MILLIGRAM(S): 2 INJECTION INTRAMUSCULAR; INTRAVENOUS; SUBCUTANEOUS at 01:30

## 2023-07-28 RX ADMIN — PIPERACILLIN AND TAZOBACTAM 25 GRAM(S): 4; .5 INJECTION, POWDER, LYOPHILIZED, FOR SOLUTION INTRAVENOUS at 23:23

## 2023-07-28 RX ADMIN — SENNA PLUS 1 TABLET(S): 8.6 TABLET ORAL at 22:16

## 2023-07-28 RX ADMIN — HYDROMORPHONE HYDROCHLORIDE 2 MILLIGRAM(S): 2 INJECTION INTRAMUSCULAR; INTRAVENOUS; SUBCUTANEOUS at 08:02

## 2023-07-28 RX ADMIN — HYDROMORPHONE HYDROCHLORIDE 2 MILLIGRAM(S): 2 INJECTION INTRAMUSCULAR; INTRAVENOUS; SUBCUTANEOUS at 09:02

## 2023-07-28 RX ADMIN — HYDROMORPHONE HYDROCHLORIDE 2 MILLIGRAM(S): 2 INJECTION INTRAMUSCULAR; INTRAVENOUS; SUBCUTANEOUS at 12:27

## 2023-07-28 RX ADMIN — HYDROMORPHONE HYDROCHLORIDE 2 MILLIGRAM(S): 2 INJECTION INTRAMUSCULAR; INTRAVENOUS; SUBCUTANEOUS at 05:14

## 2023-07-28 RX ADMIN — HYDROMORPHONE HYDROCHLORIDE 2 MILLIGRAM(S): 2 INJECTION INTRAMUSCULAR; INTRAVENOUS; SUBCUTANEOUS at 20:47

## 2023-07-28 RX ADMIN — PANTOPRAZOLE SODIUM 40 MILLIGRAM(S): 20 TABLET, DELAYED RELEASE ORAL at 14:20

## 2023-07-28 NOTE — PROGRESS NOTE ADULT - PROBLEM SELECTOR PLAN 2
- Noted to have HRs up to 130   - EKG shows ST @ 126 BPM, normal axis, no acute ST-T changes, QTc within normal limits on (07/26)  - Likely due to pain, patient avoided Dilaudid last night (07/26) due to concerns of possible urinary retention as a side effect of Dilaudid.   - HR of 97 this morning (07/27) - Noted to have HRs up to 130   - EKG shows ST @ 126 BPM, normal axis, no acute ST-T changes, QTc within normal limits on (07/26)  - Likely due to pain, patient avoided Dilaudid last night (07/26) due to concerns of possible urinary retention as a side effect of Dilaudid.   - HR of 118 this morning, after walking in room (07/28) - Likely due to constipation, symptoms improved s/p bowel movement with enema.  - UA shows leuk esterase and bacteria, however epithelial cells. Likely not a clean catch.   - Urine culture shows no growth, Patient has received 3 doses of Ceftriaxone during admission.  - Patient remains afebrile, low likelihood of UTI, will discontinue Ceftriaxone.

## 2023-07-28 NOTE — PROGRESS NOTE ADULT - SUBJECTIVE AND OBJECTIVE BOX
PROGRESS NOTE:   Authored by Jose Fong MD  Internal Medicine      Patient is a 58y old  Female who presents with a chief complaint of Severe abdominal pain s/p ERCP yesterday (28 Jul 2023 06:50)      SUBJECTIVE / OVERNIGHT EVENTS:   Received smog enema and had loose bowel movement.    Patient seen and examined at bedside  Suprapubic pressure has improved. Has been able to urinate with good output. Denies sensation of urinary retention.  Abdominal pain persistent located in the epigastric region and radiates to the back.  Does not wish to eat/advance diet due to fullness but willing to try.  Denies shortness of breath, fever, chest pain, dysuria.     MEDICATIONS  (STANDING):  amLODIPine   Tablet 5 milliGRAM(s) Oral daily  cefTRIAXone   IVPB 1000 milliGRAM(s) IV Intermittent every 24 hours  cefTRIAXone   IVPB      famotidine    Tablet 20 milliGRAM(s) Oral at bedtime  lactated ringers. 1000 milliLiter(s) (125 mL/Hr) IV Continuous <Continuous>  pantoprazole  Injectable 40 milliGRAM(s) IV Push daily  pantoprazole  Injectable      phenazopyridine 100 milliGRAM(s) Oral three times a day  polyethylene glycol 3350 17 Gram(s) Oral daily  senna 1 Tablet(s) Oral at bedtime  sodium phosphate 30 milliMole(s)/500 mL IVPB 30 milliMole(s) IV Intermittent once    MEDICATIONS  (PRN):  acetaminophen     Tablet .. 650 milliGRAM(s) Oral every 6 hours PRN Temp greater or equal to 38C (100.4F), Mild Pain (1 - 3)  aluminum hydroxide/magnesium hydroxide/simethicone Suspension 30 milliLiter(s) Oral every 4 hours PRN Heartburn  bisacodyl Suppository 10 milliGRAM(s) Rectal daily PRN Constipation  HYDROmorphone  Injectable 1 milliGRAM(s) IV Push every 3 hours PRN Moderate Pain (4 - 6)  HYDROmorphone  Injectable 2 milliGRAM(s) IV Push every 3 hours PRN Severe Pain (7 - 10)  melatonin 3 milliGRAM(s) Oral at bedtime PRN Insomnia  ondansetron Injectable 4 milliGRAM(s) IV Push every 8 hours PRN Nausea and/or Vomiting  prochlorperazine   Injectable 5 milliGRAM(s) IV Push every 4 hours PRN Nausea & Vomiting  simethicone 80 milliGRAM(s) Chew every 6 hours PRN Gas      CAPILLARY BLOOD GLUCOSE        I&O's Summary    27 Jul 2023 07:01  -  28 Jul 2023 07:00  --------------------------------------------------------  IN: 1870 mL / OUT: 750 mL / NET: 1120 mL        PHYSICAL EXAM:  Vital Signs Last 24 Hrs  T(C): 36.8 (28 Jul 2023 05:30), Max: 37.7 (27 Jul 2023 17:26)  T(F): 98.2 (28 Jul 2023 05:30), Max: 99.8 (27 Jul 2023 17:26)  HR: 118 (28 Jul 2023 05:30) (114 - 125)  BP: 132/74 (28 Jul 2023 05:30) (118/78 - 135/77)  BP(mean): --  RR: 18 (28 Jul 2023 05:30) (18 - 19)  SpO2: 95% (28 Jul 2023 05:30) (92% - 96%)    Parameters below as of 28 Jul 2023 05:30  Patient On (Oxygen Delivery Method): room air      CONSTITUTIONAL: Well-groomed, in no apparent distress. Sitting up on chair  EYES: No conjunctival or scleral injection, non-icteric;   ENMT: No external nasal lesions; MMM  RESPIRATORY: Breathing comfortably; lungs CTA without wheeze/rhonchi/rales  CARDIOVASCULAR: +S1S2, RRR, no M/G/R; (+) right lower calf appears to be minimally larger than the left, patient unsure if baseline, no erythema, no tenderness.   GASTROINTESTINAL: No palpable masses, (+) epigastric tenderness, (+) distention, +BS throughout  PSYCHIATRIC: A+O x 3; mood and affect appropriate; appropriate insight and judgment    LABS:                        11.9   11.36 )-----------( 157      ( 28 Jul 2023 04:35 )             35.3     07-28    131<L>  |  98  |  11  ----------------------------<  76  4.5   |  23  |  0.59    Ca    7.2<L>      28 Jul 2023 04:35  Phos  1.3     07-28  Mg     1.60     07-28    TPro  4.9<L>  /  Alb  2.4<L>  /  TBili  0.8  /  DBili  x   /  AST  21  /  ALT  26  /  AlkPhos  54  07-28          Urinalysis Basic - ( 28 Jul 2023 04:35 )    Color: x / Appearance: x / SG: x / pH: x  Gluc: 76 mg/dL / Ketone: x  / Bili: x / Urobili: x   Blood: x / Protein: x / Nitrite: x   Leuk Esterase: x / RBC: x / WBC x   Sq Epi: x / Non Sq Epi: x / Bacteria: x        Culture - Urine (collected 26 Jul 2023 16:40)  Source: Clean Catch Clean Catch (Midstream)  Final Report (27 Jul 2023 23:25):    <10,000 CFU/mL Normal Urogenital Zulma    Culture - Blood (collected 25 Jul 2023 16:20)  Source: .Blood Blood-Peripheral  Preliminary Report (27 Jul 2023 20:02):    No growth at 48 Hours        RADIOLOGY & ADDITIONAL TESTS:  TECHNIQUE: One view of the abdomen.    COMPARISON: Radiograph abdomen 7/25/2023    FINDINGS:  Pancreatic stent in the right lower quadrant, unchanged in position from   prior. Right upper quadrant surgical clips.    Nonobstructive bowel gas pattern.    Visualized osseous structures are unremarkable.    IMPRESSION:  Unchanged pancreatic stent in the right lower quadrant.    Nonobstructive bowel gas pattern        COORDINATION OF CARE:  Care Discussed with Consultants/Other Providers [Y/N]: Yes, GI  Prior or Outpatient Records Reviewed [Y/N]: Yes   PROGRESS NOTE:   Authored by Jose Lomax MD  Internal Medicine      Patient is a 58y old  Female who presents with a chief complaint of Severe abdominal pain s/p ERCP yesterday (28 Jul 2023 06:50)      SUBJECTIVE / OVERNIGHT EVENTS:   Received smog enema and had loose bowel movement.    Patient seen and examined at bedside  Suprapubic pressure has improved. Has been able to urinate with good output. Denies sensation of urinary retention.  Abdominal pain persistent located in the epigastric region and radiates to the back.  Does not wish to eat/advance diet due to fullness but willing to try.  Denies shortness of breath, fever, chest pain, dysuria.     MEDICATIONS  (STANDING):  amLODIPine   Tablet 5 milliGRAM(s) Oral daily  cefTRIAXone   IVPB 1000 milliGRAM(s) IV Intermittent every 24 hours  cefTRIAXone   IVPB      famotidine    Tablet 20 milliGRAM(s) Oral at bedtime  lactated ringers. 1000 milliLiter(s) (125 mL/Hr) IV Continuous <Continuous>  pantoprazole  Injectable 40 milliGRAM(s) IV Push daily  pantoprazole  Injectable      phenazopyridine 100 milliGRAM(s) Oral three times a day  polyethylene glycol 3350 17 Gram(s) Oral daily  senna 1 Tablet(s) Oral at bedtime  sodium phosphate 30 milliMole(s)/500 mL IVPB 30 milliMole(s) IV Intermittent once    MEDICATIONS  (PRN):  acetaminophen     Tablet .. 650 milliGRAM(s) Oral every 6 hours PRN Temp greater or equal to 38C (100.4F), Mild Pain (1 - 3)  aluminum hydroxide/magnesium hydroxide/simethicone Suspension 30 milliLiter(s) Oral every 4 hours PRN Heartburn  bisacodyl Suppository 10 milliGRAM(s) Rectal daily PRN Constipation  HYDROmorphone  Injectable 1 milliGRAM(s) IV Push every 3 hours PRN Moderate Pain (4 - 6)  HYDROmorphone  Injectable 2 milliGRAM(s) IV Push every 3 hours PRN Severe Pain (7 - 10)  melatonin 3 milliGRAM(s) Oral at bedtime PRN Insomnia  ondansetron Injectable 4 milliGRAM(s) IV Push every 8 hours PRN Nausea and/or Vomiting  prochlorperazine   Injectable 5 milliGRAM(s) IV Push every 4 hours PRN Nausea & Vomiting  simethicone 80 milliGRAM(s) Chew every 6 hours PRN Gas      CAPILLARY BLOOD GLUCOSE        I&O's Summary    27 Jul 2023 07:01  -  28 Jul 2023 07:00  --------------------------------------------------------  IN: 1870 mL / OUT: 750 mL / NET: 1120 mL        PHYSICAL EXAM:  Vital Signs Last 24 Hrs  T(C): 36.8 (28 Jul 2023 05:30), Max: 37.7 (27 Jul 2023 17:26)  T(F): 98.2 (28 Jul 2023 05:30), Max: 99.8 (27 Jul 2023 17:26)  HR: 118 (28 Jul 2023 05:30) (114 - 125)  BP: 132/74 (28 Jul 2023 05:30) (118/78 - 135/77)  BP(mean): --  RR: 18 (28 Jul 2023 05:30) (18 - 19)  SpO2: 95% (28 Jul 2023 05:30) (92% - 96%)    Parameters below as of 28 Jul 2023 05:30  Patient On (Oxygen Delivery Method): room air      CONSTITUTIONAL: Well-groomed, in no apparent distress. Sitting up on chair  EYES: No conjunctival or scleral injection, non-icteric;   ENMT: No external nasal lesions; MMM  RESPIRATORY: Breathing comfortably; lungs CTA without wheeze/rhonchi/rales  CARDIOVASCULAR: +S1S2, RRR, no M/G/R; (+) right lower calf appears to be minimally larger than the left, patient unsure if baseline, no erythema, no tenderness.   GASTROINTESTINAL: No palpable masses, (+) epigastric tenderness, (+) distention, +BS throughout  PSYCHIATRIC: A+O x 3; mood and affect appropriate; appropriate insight and judgment    LABS:                        11.9   11.36 )-----------( 157      ( 28 Jul 2023 04:35 )             35.3     07-28    131<L>  |  98  |  11  ----------------------------<  76  4.5   |  23  |  0.59    Ca    7.2<L>      28 Jul 2023 04:35  Phos  1.3     07-28  Mg     1.60     07-28    TPro  4.9<L>  /  Alb  2.4<L>  /  TBili  0.8  /  DBili  x   /  AST  21  /  ALT  26  /  AlkPhos  54  07-28          Urinalysis Basic - ( 28 Jul 2023 04:35 )    Color: x / Appearance: x / SG: x / pH: x  Gluc: 76 mg/dL / Ketone: x  / Bili: x / Urobili: x   Blood: x / Protein: x / Nitrite: x   Leuk Esterase: x / RBC: x / WBC x   Sq Epi: x / Non Sq Epi: x / Bacteria: x        Culture - Urine (collected 26 Jul 2023 16:40)  Source: Clean Catch Clean Catch (Midstream)  Final Report (27 Jul 2023 23:25):    <10,000 CFU/mL Normal Urogenital Zulma    Culture - Blood (collected 25 Jul 2023 16:20)  Source: .Blood Blood-Peripheral  Preliminary Report (27 Jul 2023 20:02):    No growth at 48 Hours        RADIOLOGY & ADDITIONAL TESTS:  TECHNIQUE: One view of the abdomen.    COMPARISON: Radiograph abdomen 7/25/2023    FINDINGS:  Pancreatic stent in the right lower quadrant, unchanged in position from   prior. Right upper quadrant surgical clips.    Nonobstructive bowel gas pattern.    Visualized osseous structures are unremarkable.    IMPRESSION:  Unchanged pancreatic stent in the right lower quadrant.    Nonobstructive bowel gas pattern        ACC: [id number] EXAM:  US DPLX LWR EXT VEINS COMPL BI   ORDERED BY: JOSE LOMAX     PROCEDURE DATE:  07/28/2023          INTERPRETATION:  CLINICAL INFORMATION: Hypoxia and lower extremity edema.    COMPARISON: None available.    TECHNIQUE: Duplex sonography of the BILATERAL LOWER extremity veins with   color and spectral Doppler, with and without compression.    FINDINGS:    RIGHT:  Normal compressibility of the RIGHT common femoral, femoral and popliteal   veins.  Doppler examination shows normal spontaneous and phasic flow.  No RIGHT calf vein thrombosis is detected.    LEFT:  Normal compressibility of the LEFT common femoral, femoral and popliteal   veins.  Doppler examination shows normal spontaneous and phasic flow.  No LEFT calf veinthrombosis is detected.    IMPRESSION:  No evidence of deep venous thrombosis in either lower extremity.      COORDINATION OF CARE:  Care Discussed with Consultants/Other Providers [Y/N]: Yes, GI  Prior or Outpatient Records Reviewed [Y/N]: Yes

## 2023-07-28 NOTE — PROGRESS NOTE ADULT - ASSESSMENT
58 year old female with history of HTN, GERD, and elevated liver chemistries who presents with abdominal pain after ERCP.    # Post-ERCP pancreatitis  -EGD/EUS/ERCP on 7/24/2023 that revealed 2cm hiatal hernia, copious CBD sludge, and CBD dilatation to ampulla, stenotic papilla, 5F x 6cm single pigtail PD stent, biliary sphincterotomy, and sludge/small fragments removed using balloon catheter  -upon admission, severe/sharp epigastric pain, lipase >3000, liver chemistries normal, CT A/P with severe acute interstitial edematous pancreatitis with extensive peripancreatic edema/fluid    Recommendations:  -trend clinical symptoms, exam findings, vital signs, CBC, CMP, INR  -aggressive IVF  -pain control and antiemetics as QTc allows  -UTI workup/management per primary team  -enemas as needed for constipation    Note incomplete until finalized by attending signature/attestation.    Toan Murillo  GI/Hepatology Fellow    MONDAY-FRIDAY 8AM-5PM:  Pager# [phone] ([facility]) or [phone] ([facility])    NON-URGENT CONSULTS:  Please email [email or web address]  AT NIGHT AND ON WEEKENDS:  Contact on-call GI fellow via answering service ([phone]) from 5pm-8am and on weekends/holidays

## 2023-07-28 NOTE — PROVIDER CONTACT NOTE (OTHER) - ASSESSMENT
patient has tachy 136, patient has O2 sat 86%, patient urine output orange
patient is starting to get upset that the CT scan has not been done
patient has tachy 133, patient has O2 sat 96%, patient urine output orange
Pt is A&Ox4  Pt BP is stable, but is tachycardic. SPO2 92.   Tachy 120.
patient crying in pain, appears very uncomfortable.

## 2023-07-28 NOTE — PROVIDER CONTACT NOTE (OTHER) - REASON
Pt is tachy
ANM tried calling CT scan to expedite patients CT scan and got no response
Patient complaining of pain, crying, appears uncomfortable
patient has tachy 136
patient has tachy 133,  bladder pressure when standing

## 2023-07-28 NOTE — PROGRESS NOTE ADULT - SUBJECTIVE AND OBJECTIVE BOX
Interval Events:   No acute events overnight.  Patient continues to endorse abdominal pain, however slightly improved from prior.    ROS:   12 point review of systems performed and negative except otherwise noted in HPI.    Hospital Medications:  acetaminophen     Tablet .. 650 milliGRAM(s) Oral every 6 hours PRN  aluminum hydroxide/magnesium hydroxide/simethicone Suspension 30 milliLiter(s) Oral every 4 hours PRN  amLODIPine   Tablet 5 milliGRAM(s) Oral daily  bisacodyl Suppository 10 milliGRAM(s) Rectal daily PRN  cefTRIAXone   IVPB 1000 milliGRAM(s) IV Intermittent every 24 hours  cefTRIAXone   IVPB      famotidine    Tablet 20 milliGRAM(s) Oral at bedtime  HYDROmorphone  Injectable 1 milliGRAM(s) IV Push every 3 hours PRN  HYDROmorphone  Injectable 2 milliGRAM(s) IV Push every 3 hours PRN  lactated ringers. 1000 milliLiter(s) IV Continuous <Continuous>  melatonin 3 milliGRAM(s) Oral at bedtime PRN  ondansetron Injectable 4 milliGRAM(s) IV Push every 8 hours PRN  pantoprazole  Injectable 40 milliGRAM(s) IV Push daily  pantoprazole  Injectable      phenazopyridine 100 milliGRAM(s) Oral three times a day  polyethylene glycol 3350 17 Gram(s) Oral daily  prochlorperazine   Injectable 5 milliGRAM(s) IV Push every 4 hours PRN  senna 1 Tablet(s) Oral at bedtime  simethicone 80 milliGRAM(s) Chew every 6 hours PRN      PHYSICAL EXAM:   Vital Signs:  Vital Signs Last 24 Hrs  T(C): 36.8 (28 Jul 2023 05:30), Max: 37.7 (27 Jul 2023 17:26)  T(F): 98.2 (28 Jul 2023 05:30), Max: 99.8 (27 Jul 2023 17:26)  HR: 118 (28 Jul 2023 05:30) (114 - 125)  BP: 132/74 (28 Jul 2023 05:30) (118/78 - 135/77)  BP(mean): --  RR: 18 (28 Jul 2023 05:30) (18 - 19)  SpO2: 95% (28 Jul 2023 05:30) (92% - 96%)    Parameters below as of 28 Jul 2023 05:30  Patient On (Oxygen Delivery Method): room air      Daily     Daily     GENERAL: no acute distress  NEURO: alert  HEENT: NCAT, no conjunctival pallor appreciated  CHEST: no respiratory distress, no accessory muscle use  CARDIAC: regular rate, +S1/S2  ABDOMEN: soft, epigastric tenderness, no rebound or guarding  EXTREMITIES: warm, well perfused  SKIN: no lesions noted    LABS: reviewed                        11.9   11.36 )-----------( 157      ( 28 Jul 2023 04:35 )             35.3     07-28    131<L>  |  98  |  11  ----------------------------<  76  4.5   |  23  |  0.59    Ca    7.2<L>      28 Jul 2023 04:35  Phos  1.3     07-28  Mg     1.60     07-28    TPro  4.9<L>  /  Alb  2.4<L>  /  TBili  0.8  /  DBili  x   /  AST  21  /  ALT  26  /  AlkPhos  54  07-28    LIVER FUNCTIONS - ( 28 Jul 2023 04:35 )  Alb: 2.4 g/dL / Pro: 4.9 g/dL / ALK PHOS: 54 U/L / ALT: 26 U/L / AST: 21 U/L / GGT: x             Interval Diagnostic Studies: see sunrise for full report

## 2023-07-28 NOTE — PROGRESS NOTE ADULT - PROBLEM SELECTOR PLAN 7
DVT ppx: SCDs, promote movement  GI ppx: Pantoprazole 40 mg daily, Pepcid 20 mg at bedtime .   Diet: NPO with ice and water, advance as tolerated. DVT ppx: SCDs, promote movement  GI ppx: Pantoprazole 40 mg daily, Pepcid 20 mg at bedtime .   Diet: Clear liquid, advance as tolerated.

## 2023-07-28 NOTE — PROGRESS NOTE ADULT - PROBLEM SELECTOR PLAN 1
- symptoms began 1 day s/p ERCP (07/24), likely induced to recent procedure  - Dr. Park, GI, is following. Recs appreciated  - Follow up infectious workup; blood cultures, UA, urine culture  - Trend CBC, BMP, coags.   - Zofran 4 mg q 8 hours PRN for nausea   - Prochlorperazine 5 mg IV q4 hours PRN for nausea  - Pain management:   Mild pain: Tylenol 650 mg PO PRN q 6 hours    Moderate pain: Dilaudid 1 mg IV PRN q 3 hours    Severe pain: Dilaudid 2 mg IV PRN q 3 hours   - IVF hydration; NS at 125 cc/hour standing.  - Incentive spirometry  - Encouraged PO intake   - advance diet as tolerated - symptoms began 1 day s/p ERCP (07/24), likely induced to recent procedure  - Given no resolution of symptoms s/p 3 day admissions with 21% bands on labs, repeat CT abdomen/pelvis w IV contrast ordered. Zosyn started empirically.    - Dr. Park, GI, is following. Recs appreciated  - Follow up infectious workup; blood cultures, UA, urine culture  - Trend CBC, BMP, coags.   - Zofran 4 mg q 8 hours PRN for nausea   - Prochlorperazine 5 mg IV q4 hours PRN for nausea  - Pain management:   Mild pain: Tylenol 650 mg PO PRN q 6 hours    Moderate pain: Dilaudid 1 mg IV PRN q 3 hours    Severe pain: Dilaudid 2 mg IV PRN q 3 hours   - IVF hydration; NS at 125 cc/hour standing.  - Incentive spirometry  - Encouraged PO intake   - advance diet as tolerated

## 2023-07-28 NOTE — PROVIDER CONTACT NOTE (OTHER) - ACTION/TREATMENT ORDERED:
will re-evaluate pain management regimen
send U/A and UC, breathing exercises, incentive spirometer, ambulated to BR and on unit, then sat up in recliner for improved pO2/relief of bladder pressure when standing
MD made aware. Said day team tried to get in contact as well with no response. She will also try now to get a hold of CT scan.
send U/A and UC, breathing exercises, incentive spirometer, ambulated to BR and then sat up in recliner for improved pO2
Will order  for patient. Patient assessed. No supplemental oxygen.

## 2023-07-28 NOTE — PROGRESS NOTE ADULT - PROBLEM SELECTOR PLAN 4
- UA shows positive nitrates, few bacteria, leuk esterase, 7 WBCs  - Urine cultures pending.   - Ceftriaxone 1 g IV q daily  - Bladder scan of 120-160 cc today after ~200cc urine output. - UA shows positive nitrates, few bacteria, leuk esterase, 7 WBCs  - Urine cultures with no growth to date, following cultures.   - Ceftriaxone 1 g IV q daily - Noted to have O2 sat of 89-92% on room air  - Patient has thick nail polish, moved pulse ox reader to earlobe, O2 sat improved to 92%  - Instructed patient to get up and move to chair from bed. O2 improved to 96-98%  - Transient hypoxia likely a result of positioning/splinting.   - Denies shortness of breath.  - Saturating 97%-98% on room air consistently (07/27-07/28) - Noted to have O2 sat of 89-92% on room air  - Patient has thick nail polish, moved pulse ox reader to earlobe, O2 sat improved to 92%  - Instructed patient to get up and move to chair from bed. O2 improved to 96-98%  - Transient hypoxia likely a result of positioning/splinting.   - Denies shortness of breath.  - Saturating 97%-98% on room air consistently (07/27-07/28)  - US DVT is negative

## 2023-07-28 NOTE — PROGRESS NOTE ADULT - PROBLEM SELECTOR PLAN 5
- Has not passed flatus nor stool since admission  - Standing Miralax and Senna  - PRN Simethicone, Maalox, Dulcolax suppository - XR abdomen with no sign of obstruction  - Enema available as per patient preference   - Standing Miralax and Senna  - PRN Simethicone, Maalox, Dulcolax suppository  - PRN enema

## 2023-07-28 NOTE — PROVIDER CONTACT NOTE (OTHER) - RECOMMENDATIONS
Oxygen or 
MD to see if she can expedite as well
MD made aware, EKG sent
MD made aware, urine had been sent for U/A, UC

## 2023-07-28 NOTE — PROVIDER CONTACT NOTE (OTHER) - SITUATION
patient has tachy 133
For Pt David Pedro Rm 426A. Pt HR is 120- see other vitals in flowsheet.
Patient complaining of pain, crying, appears uncomfortable
ANM tried calling CT scan to expedite patients CT scan and got no response
patient has tachy 136

## 2023-07-28 NOTE — PROGRESS NOTE ADULT - PROBLEM SELECTOR PLAN 3
- Noted to have O2 sat of 89-92% on room air  - Patient has thick nail polish, moved pulse ox reader to earlobe, O2 sat improved to 92%  - Instructed patient to get up and move to chair from bed. O2 improved to 96-98%  - Transient hypoxia likely a result of positioning/splinting.   - Denies shortness of breath. - Noted to have O2 sat of 89-92% on room air  - Patient has thick nail polish, moved pulse ox reader to earlobe, O2 sat improved to 92%  - Instructed patient to get up and move to chair from bed. O2 improved to 96-98%  - Transient hypoxia likely a result of positioning/splinting.   - Denies shortness of breath.  - Saturating 97%-98% on room air consistently (07/27-07/28) - Noted to have HRs up to 130   - EKG shows ST @ 126 BPM, normal axis, no acute ST-T changes, QTc within normal limits on (07/26)  - Likely due to pain  - HR of 118 this morning, after walking in room (07/28)

## 2023-07-29 LAB
ALBUMIN SERPL ELPH-MCNC: 2.7 G/DL — LOW (ref 3.3–5)
ALP SERPL-CCNC: 56 U/L — SIGNIFICANT CHANGE UP (ref 40–120)
ALT FLD-CCNC: 24 U/L — SIGNIFICANT CHANGE UP (ref 4–33)
ANION GAP SERPL CALC-SCNC: 11 MMOL/L — SIGNIFICANT CHANGE UP (ref 7–14)
AST SERPL-CCNC: 22 U/L — SIGNIFICANT CHANGE UP (ref 4–32)
BASOPHILS # BLD AUTO: 0.05 K/UL — SIGNIFICANT CHANGE UP (ref 0–0.2)
BASOPHILS NFR BLD AUTO: 0.5 % — SIGNIFICANT CHANGE UP (ref 0–2)
BILIRUB SERPL-MCNC: 0.9 MG/DL — SIGNIFICANT CHANGE UP (ref 0.2–1.2)
BUN SERPL-MCNC: 8 MG/DL — SIGNIFICANT CHANGE UP (ref 7–23)
CALCIUM SERPL-MCNC: 7.5 MG/DL — LOW (ref 8.4–10.5)
CHLORIDE SERPL-SCNC: 96 MMOL/L — LOW (ref 98–107)
CO2 SERPL-SCNC: 25 MMOL/L — SIGNIFICANT CHANGE UP (ref 22–31)
CREAT SERPL-MCNC: 0.6 MG/DL — SIGNIFICANT CHANGE UP (ref 0.5–1.3)
EGFR: 104 ML/MIN/1.73M2 — SIGNIFICANT CHANGE UP
EOSINOPHIL # BLD AUTO: 0.04 K/UL — SIGNIFICANT CHANGE UP (ref 0–0.5)
EOSINOPHIL NFR BLD AUTO: 0.4 % — SIGNIFICANT CHANGE UP (ref 0–6)
GLUCOSE SERPL-MCNC: 95 MG/DL — SIGNIFICANT CHANGE UP (ref 70–99)
HCT VFR BLD CALC: 35.5 % — SIGNIFICANT CHANGE UP (ref 34.5–45)
HGB BLD-MCNC: 11.7 G/DL — SIGNIFICANT CHANGE UP (ref 11.5–15.5)
IANC: 9.19 K/UL — HIGH (ref 1.8–7.4)
IMM GRANULOCYTES NFR BLD AUTO: 0.5 % — SIGNIFICANT CHANGE UP (ref 0–0.9)
LYMPHOCYTES # BLD AUTO: 0.7 K/UL — LOW (ref 1–3.3)
LYMPHOCYTES # BLD AUTO: 6.3 % — LOW (ref 13–44)
MAGNESIUM SERPL-MCNC: 2 MG/DL — SIGNIFICANT CHANGE UP (ref 1.6–2.6)
MCHC RBC-ENTMCNC: 30.3 PG — SIGNIFICANT CHANGE UP (ref 27–34)
MCHC RBC-ENTMCNC: 33 GM/DL — SIGNIFICANT CHANGE UP (ref 32–36)
MCV RBC AUTO: 92 FL — SIGNIFICANT CHANGE UP (ref 80–100)
MONOCYTES # BLD AUTO: 1.05 K/UL — HIGH (ref 0–0.9)
MONOCYTES NFR BLD AUTO: 9.5 % — SIGNIFICANT CHANGE UP (ref 2–14)
NEUTROPHILS # BLD AUTO: 9.19 K/UL — HIGH (ref 1.8–7.4)
NEUTROPHILS NFR BLD AUTO: 82.8 % — HIGH (ref 43–77)
NRBC # BLD: 0 /100 WBCS — SIGNIFICANT CHANGE UP (ref 0–0)
NRBC # FLD: 0 K/UL — SIGNIFICANT CHANGE UP (ref 0–0)
PHOSPHATE SERPL-MCNC: 1.7 MG/DL — LOW (ref 2.5–4.5)
PLATELET # BLD AUTO: 175 K/UL — SIGNIFICANT CHANGE UP (ref 150–400)
POTASSIUM SERPL-MCNC: 3.4 MMOL/L — LOW (ref 3.5–5.3)
POTASSIUM SERPL-SCNC: 3.4 MMOL/L — LOW (ref 3.5–5.3)
PROT SERPL-MCNC: 5.5 G/DL — LOW (ref 6–8.3)
RBC # BLD: 3.86 M/UL — SIGNIFICANT CHANGE UP (ref 3.8–5.2)
RBC # FLD: 12.9 % — SIGNIFICANT CHANGE UP (ref 10.3–14.5)
SODIUM SERPL-SCNC: 132 MMOL/L — LOW (ref 135–145)
WBC # BLD: 11.09 K/UL — HIGH (ref 3.8–10.5)
WBC # FLD AUTO: 11.09 K/UL — HIGH (ref 3.8–10.5)

## 2023-07-29 PROCEDURE — 74177 CT ABD & PELVIS W/CONTRAST: CPT | Mod: 26

## 2023-07-29 PROCEDURE — 99232 SBSQ HOSP IP/OBS MODERATE 35: CPT | Mod: GC

## 2023-07-29 PROCEDURE — 99221 1ST HOSP IP/OBS SF/LOW 40: CPT

## 2023-07-29 RX ORDER — POTASSIUM PHOSPHATE, MONOBASIC POTASSIUM PHOSPHATE, DIBASIC 236; 224 MG/ML; MG/ML
30 INJECTION, SOLUTION INTRAVENOUS ONCE
Refills: 0 | Status: COMPLETED | OUTPATIENT
Start: 2023-07-29 | End: 2023-07-29

## 2023-07-29 RX ORDER — SODIUM CHLORIDE 9 MG/ML
1000 INJECTION, SOLUTION INTRAVENOUS
Refills: 0 | Status: DISCONTINUED | OUTPATIENT
Start: 2023-07-29 | End: 2023-07-29

## 2023-07-29 RX ORDER — KETOROLAC TROMETHAMINE 30 MG/ML
15 SYRINGE (ML) INJECTION EVERY 6 HOURS
Refills: 0 | Status: DISCONTINUED | OUTPATIENT
Start: 2023-07-29 | End: 2023-07-31

## 2023-07-29 RX ORDER — IPRATROPIUM/ALBUTEROL SULFATE 18-103MCG
3 AEROSOL WITH ADAPTER (GRAM) INHALATION EVERY 6 HOURS
Refills: 0 | Status: DISCONTINUED | OUTPATIENT
Start: 2023-07-29 | End: 2023-07-31

## 2023-07-29 RX ADMIN — Medication 15 MILLIGRAM(S): at 17:13

## 2023-07-29 RX ADMIN — HYDROMORPHONE HYDROCHLORIDE 2 MILLIGRAM(S): 2 INJECTION INTRAMUSCULAR; INTRAVENOUS; SUBCUTANEOUS at 02:53

## 2023-07-29 RX ADMIN — Medication 3 MILLILITER(S): at 20:18

## 2023-07-29 RX ADMIN — HYDROMORPHONE HYDROCHLORIDE 2 MILLIGRAM(S): 2 INJECTION INTRAMUSCULAR; INTRAVENOUS; SUBCUTANEOUS at 11:39

## 2023-07-29 RX ADMIN — HYDROMORPHONE HYDROCHLORIDE 2 MILLIGRAM(S): 2 INJECTION INTRAMUSCULAR; INTRAVENOUS; SUBCUTANEOUS at 23:21

## 2023-07-29 RX ADMIN — Medication 15 MILLIGRAM(S): at 23:21

## 2023-07-29 RX ADMIN — PIPERACILLIN AND TAZOBACTAM 25 GRAM(S): 4; .5 INJECTION, POWDER, LYOPHILIZED, FOR SOLUTION INTRAVENOUS at 22:12

## 2023-07-29 RX ADMIN — HYDROMORPHONE HYDROCHLORIDE 2 MILLIGRAM(S): 2 INJECTION INTRAMUSCULAR; INTRAVENOUS; SUBCUTANEOUS at 17:12

## 2023-07-29 RX ADMIN — FAMOTIDINE 20 MILLIGRAM(S): 10 INJECTION INTRAVENOUS at 22:12

## 2023-07-29 RX ADMIN — PIPERACILLIN AND TAZOBACTAM 25 GRAM(S): 4; .5 INJECTION, POWDER, LYOPHILIZED, FOR SOLUTION INTRAVENOUS at 06:07

## 2023-07-29 RX ADMIN — HYDROMORPHONE HYDROCHLORIDE 2 MILLIGRAM(S): 2 INJECTION INTRAMUSCULAR; INTRAVENOUS; SUBCUTANEOUS at 03:23

## 2023-07-29 RX ADMIN — POTASSIUM PHOSPHATE, MONOBASIC POTASSIUM PHOSPHATE, DIBASIC 83.33 MILLIMOLE(S): 236; 224 INJECTION, SOLUTION INTRAVENOUS at 12:13

## 2023-07-29 RX ADMIN — HYDROMORPHONE HYDROCHLORIDE 2 MILLIGRAM(S): 2 INJECTION INTRAMUSCULAR; INTRAVENOUS; SUBCUTANEOUS at 14:00

## 2023-07-29 RX ADMIN — HYDROMORPHONE HYDROCHLORIDE 2 MILLIGRAM(S): 2 INJECTION INTRAMUSCULAR; INTRAVENOUS; SUBCUTANEOUS at 06:41

## 2023-07-29 RX ADMIN — PIPERACILLIN AND TAZOBACTAM 25 GRAM(S): 4; .5 INJECTION, POWDER, LYOPHILIZED, FOR SOLUTION INTRAVENOUS at 13:59

## 2023-07-29 RX ADMIN — Medication 3 MILLILITER(S): at 16:06

## 2023-07-29 RX ADMIN — HYDROMORPHONE HYDROCHLORIDE 2 MILLIGRAM(S): 2 INJECTION INTRAMUSCULAR; INTRAVENOUS; SUBCUTANEOUS at 20:20

## 2023-07-29 RX ADMIN — HYDROMORPHONE HYDROCHLORIDE 2 MILLIGRAM(S): 2 INJECTION INTRAMUSCULAR; INTRAVENOUS; SUBCUTANEOUS at 10:39

## 2023-07-29 RX ADMIN — HYDROMORPHONE HYDROCHLORIDE 2 MILLIGRAM(S): 2 INJECTION INTRAMUSCULAR; INTRAVENOUS; SUBCUTANEOUS at 06:11

## 2023-07-29 RX ADMIN — SENNA PLUS 1 TABLET(S): 8.6 TABLET ORAL at 22:12

## 2023-07-29 RX ADMIN — PANTOPRAZOLE SODIUM 40 MILLIGRAM(S): 20 TABLET, DELAYED RELEASE ORAL at 12:13

## 2023-07-29 RX ADMIN — Medication 3 MILLILITER(S): at 10:04

## 2023-07-29 RX ADMIN — HYDROMORPHONE HYDROCHLORIDE 2 MILLIGRAM(S): 2 INJECTION INTRAMUSCULAR; INTRAVENOUS; SUBCUTANEOUS at 23:51

## 2023-07-29 RX ADMIN — HYDROMORPHONE HYDROCHLORIDE 2 MILLIGRAM(S): 2 INJECTION INTRAMUSCULAR; INTRAVENOUS; SUBCUTANEOUS at 20:50

## 2023-07-29 RX ADMIN — Medication 15 MILLIGRAM(S): at 23:51

## 2023-07-29 RX ADMIN — AMLODIPINE BESYLATE 5 MILLIGRAM(S): 2.5 TABLET ORAL at 06:07

## 2023-07-29 RX ADMIN — HYDROMORPHONE HYDROCHLORIDE 2 MILLIGRAM(S): 2 INJECTION INTRAMUSCULAR; INTRAVENOUS; SUBCUTANEOUS at 18:12

## 2023-07-29 RX ADMIN — Medication 15 MILLIGRAM(S): at 18:13

## 2023-07-29 RX ADMIN — HYDROMORPHONE HYDROCHLORIDE 2 MILLIGRAM(S): 2 INJECTION INTRAMUSCULAR; INTRAVENOUS; SUBCUTANEOUS at 15:00

## 2023-07-29 NOTE — PROGRESS NOTE ADULT - SUBJECTIVE AND OBJECTIVE BOX
PROGRESS NOTE:   Authored by Jose Fong MD  Internal Medicine    Patient is a 58y old  Female who presents with a chief complaint of Severe abdominal pain s/p ERCP yesterday (28 Jul 2023 09:39)    SUBJECTIVE / OVERNIGHT EVENTS:  Wheezing and spO2 of 89 overnight.  Duonebs ordered but not given. Overnight nursing unable to get hold of respiratory therapy?  Patient seen and examined  Patient reports sensation to the abdomen/epigastric region but unsure if it is pain or distension/fluid build up due to fluids.   No nausea, vomiting.   Denies shortness of breath. However, has had cough with clear sputum.   Edema of the lower extremities present.   No chest pain, no dysuria, fevers, chills.     MEDICATIONS  (STANDING):  albuterol/ipratropium for Nebulization 3 milliLiter(s) Nebulizer every 6 hours  amLODIPine   Tablet 5 milliGRAM(s) Oral daily  famotidine    Tablet 20 milliGRAM(s) Oral at bedtime  lactated ringers. 1000 milliLiter(s) (100 mL/Hr) IV Continuous <Continuous>  pantoprazole  Injectable 40 milliGRAM(s) IV Push daily  pantoprazole  Injectable      piperacillin/tazobactam IVPB.. 3.375 Gram(s) IV Intermittent every 8 hours  polyethylene glycol 3350 17 Gram(s) Oral daily  potassium phosphate IVPB 30 milliMole(s) IV Intermittent once  senna 1 Tablet(s) Oral at bedtime    MEDICATIONS  (PRN):  acetaminophen     Tablet .. 650 milliGRAM(s) Oral every 6 hours PRN Temp greater or equal to 38C (100.4F), Mild Pain (1 - 3)  aluminum hydroxide/magnesium hydroxide/simethicone Suspension 30 milliLiter(s) Oral every 4 hours PRN Heartburn  bisacodyl Suppository 10 milliGRAM(s) Rectal daily PRN Constipation  HYDROmorphone  Injectable 1 milliGRAM(s) IV Push every 3 hours PRN Moderate Pain (4 - 6)  HYDROmorphone  Injectable 2 milliGRAM(s) IV Push every 3 hours PRN Severe Pain (7 - 10)  melatonin 3 milliGRAM(s) Oral at bedtime PRN Insomnia  ondansetron Injectable 4 milliGRAM(s) IV Push every 8 hours PRN Nausea and/or Vomiting  prochlorperazine   Injectable 5 milliGRAM(s) IV Push every 4 hours PRN Nausea & Vomiting  simethicone 80 milliGRAM(s) Chew every 6 hours PRN Gas  sorbitol 70%/mineral oil/magnesium hydroxide/glycerin Enema 120 milliLiter(s) Rectal daily PRN Constipation    CAPILLARY BLOOD GLUCOSE    I&O's Summary    28 Jul 2023 07:01  -  29 Jul 2023 07:00  --------------------------------------------------------  IN: 1980 mL / OUT: 1950 mL / NET: 30 mL    PHYSICAL EXAM:  Vital Signs Last 24 Hrs  T(C): 37.3 (29 Jul 2023 06:10), Max: 37.8 (28 Jul 2023 21:37)  T(F): 99.1 (29 Jul 2023 06:10), Max: 100.1 (28 Jul 2023 21:37)  HR: 125 (29 Jul 2023 06:10) (113 - 129)  BP: 141/82 (29 Jul 2023 06:10) (124/75 - 147/85)  BP(mean): --  RR: 18 (29 Jul 2023 06:10) (17 - 19)  SpO2: 96% (29 Jul 2023 06:10) (95% - 100%)    Parameters below as of 29 Jul 2023 06:10  Patient On (Oxygen Delivery Method): room air    CONSTITUTIONAL: Well-groomed, in no apparent distress  EYES: No conjunctival or scleral injection, non-icteric;   ENMT: No external nasal lesions; MMM  NECK: Trachea midline without palpable neck mass; thyroid not enlarged and non-tender  RESPIRATORY: Breathing comfortably; no dullness to percussion; lungs CTA without wheeze/rhonchi/rales  CARDIOVASCULAR: +S1S2, RRR, no M/G/R; pedal pulses full and symmetric; no lower extremity edema  GASTROINTESTINAL: No palpable masses or tenderness, +BS throughout, no rebound/guarding; no hepatosplenomegaly; no hernia palpated  LYMPHATIC: No cervical LAD or tenderness  SKIN: No rashes or ulcers noted  NEUROLOGIC: CN II-XII intact; sensation intact in LEs b/l to light touch  PSYCHIATRIC: A+O x 3; mood and affect appropriate; appropriate insight and judgment    LABS:                        11.7   11.09 )-----------( 175      ( 29 Jul 2023 06:15 )             35.5     07-29    132<L>  |  96<L>  |  8   ----------------------------<  95  3.4<L>   |  25  |  0.60    Ca    7.5<L>      29 Jul 2023 06:15  Phos  1.7     07-29  Mg     2.00     07-29    TPro  5.5<L>  /  Alb  2.7<L>  /  TBili  0.9  /  DBili  x   /  AST  22  /  ALT  24  /  AlkPhos  56  07-29          Urinalysis Basic - ( 29 Jul 2023 06:15 )    Color: x / Appearance: x / SG: x / pH: x  Gluc: 95 mg/dL / Ketone: x  / Bili: x / Urobili: x   Blood: x / Protein: x / Nitrite: x   Leuk Esterase: x / RBC: x / WBC x   Sq Epi: x / Non Sq Epi: x / Bacteria: x        Culture - Urine (collected 26 Jul 2023 16:40)  Source: Clean Catch Clean Catch (Midstream)  Final Report (27 Jul 2023 23:25):    <10,000 CFU/mL Normal Urogenital Zulma        RADIOLOGY & ADDITIONAL TESTS:  Results Reviewed:   Imaging Personally Reviewed:  Electrocardiogram Personally Reviewed:    COORDINATION OF CARE:  Care Discussed with Consultants/Other Providers [Y/N]:  Prior or Outpatient Records Reviewed [Y/N]:   PROGRESS NOTE:   Authored by Jose Fong MD  Internal Medicine    Patient is a 58y old  Female who presents with a chief complaint of Severe abdominal pain s/p ERCP yesterday (28 Jul 2023 09:39)    SUBJECTIVE / OVERNIGHT EVENTS:  Wheezing and spO2 of 89 overnight.  Duonebs ordered but not given. Overnight nursing unable to get hold of respiratory therapy?  Patient seen and examined  Patient reports sensation to the abdomen/epigastric region but unsure if it is pain or distension/fluid build up due to fluids.   No nausea, vomiting.   Denies shortness of breath. However, has had cough with clear sputum.   Edema of the lower extremities present.   No chest pain, no dysuria, fevers, chills.     MEDICATIONS  (STANDING):  albuterol/ipratropium for Nebulization 3 milliLiter(s) Nebulizer every 6 hours  amLODIPine   Tablet 5 milliGRAM(s) Oral daily  famotidine    Tablet 20 milliGRAM(s) Oral at bedtime  lactated ringers. 1000 milliLiter(s) (100 mL/Hr) IV Continuous <Continuous>  pantoprazole  Injectable 40 milliGRAM(s) IV Push daily  pantoprazole  Injectable      piperacillin/tazobactam IVPB.. 3.375 Gram(s) IV Intermittent every 8 hours  polyethylene glycol 3350 17 Gram(s) Oral daily  potassium phosphate IVPB 30 milliMole(s) IV Intermittent once  senna 1 Tablet(s) Oral at bedtime    MEDICATIONS  (PRN):  acetaminophen     Tablet .. 650 milliGRAM(s) Oral every 6 hours PRN Temp greater or equal to 38C (100.4F), Mild Pain (1 - 3)  aluminum hydroxide/magnesium hydroxide/simethicone Suspension 30 milliLiter(s) Oral every 4 hours PRN Heartburn  bisacodyl Suppository 10 milliGRAM(s) Rectal daily PRN Constipation  HYDROmorphone  Injectable 1 milliGRAM(s) IV Push every 3 hours PRN Moderate Pain (4 - 6)  HYDROmorphone  Injectable 2 milliGRAM(s) IV Push every 3 hours PRN Severe Pain (7 - 10)  melatonin 3 milliGRAM(s) Oral at bedtime PRN Insomnia  ondansetron Injectable 4 milliGRAM(s) IV Push every 8 hours PRN Nausea and/or Vomiting  prochlorperazine   Injectable 5 milliGRAM(s) IV Push every 4 hours PRN Nausea & Vomiting  simethicone 80 milliGRAM(s) Chew every 6 hours PRN Gas  sorbitol 70%/mineral oil/magnesium hydroxide/glycerin Enema 120 milliLiter(s) Rectal daily PRN Constipation    CAPILLARY BLOOD GLUCOSE    I&O's Summary    28 Jul 2023 07:01  -  29 Jul 2023 07:00  --------------------------------------------------------  IN: 1980 mL / OUT: 1950 mL / NET: 30 mL    PHYSICAL EXAM:  Vital Signs Last 24 Hrs  T(C): 37.3 (29 Jul 2023 06:10), Max: 37.8 (28 Jul 2023 21:37)  T(F): 99.1 (29 Jul 2023 06:10), Max: 100.1 (28 Jul 2023 21:37)  HR: 125 (29 Jul 2023 06:10) (113 - 129)  BP: 141/82 (29 Jul 2023 06:10) (124/75 - 147/85)  BP(mean): --  RR: 18 (29 Jul 2023 06:10) (17 - 19)  SpO2: 96% (29 Jul 2023 06:10) (95% - 100%)    Parameters below as of 29 Jul 2023 06:10  Patient On (Oxygen Delivery Method): room air    CONSTITUTIONAL: Well-groomed, in no apparent distress. Walking around room.   EYES: No conjunctival or scleral injection, non-icteric;   ENMT: No external nasal lesions; MMM  RESPIRATORY: Breathing comfortably; no dullness to percussion; (+) wheezing noted bilaterally throughout lung fields.   CARDIOVASCULAR: +S1S2, RRR, no M/G/R; pedal pulses full and symmetric; (+) bilateral lower extremity edema.   GASTROINTESTINAL: (+) significant abdominal distention, (+) generalized tenderness, more apparent to the epigastric region,  +BS throughout  PSYCHIATRIC: A+O x 3; mood and affect appropriate; appropriate insight and judgment    LABS:                        11.7   11.09 )-----------( 175      ( 29 Jul 2023 06:15 )             35.5     07-29    132<L>  |  96<L>  |  8   ----------------------------<  95  3.4<L>   |  25  |  0.60    Ca    7.5<L>      29 Jul 2023 06:15  Phos  1.7     07-29  Mg     2.00     07-29    TPro  5.5<L>  /  Alb  2.7<L>  /  TBili  0.9  /  DBili  x   /  AST  22  /  ALT  24  /  AlkPhos  56  07-29          Urinalysis Basic - ( 29 Jul 2023 06:15 )    Color: x / Appearance: x / SG: x / pH: x  Gluc: 95 mg/dL / Ketone: x  / Bili: x / Urobili: x   Blood: x / Protein: x / Nitrite: x   Leuk Esterase: x / RBC: x / WBC x   Sq Epi: x / Non Sq Epi: x / Bacteria: x        Culture - Urine (collected 26 Jul 2023 16:40)  Source: Clean Catch Clean Catch (Midstream)  Final Report (27 Jul 2023 23:25):    <10,000 CFU/mL Normal Urogenital Zulma        RADIOLOGY & ADDITIONAL TESTS:  Results Reviewed:   Imaging Personally Reviewed:  Electrocardiogram Personally Reviewed:    COORDINATION OF CARE:  Care Discussed with Consultants/Other Providers [Y/N]: Yes, GI  Prior or Outpatient Records Reviewed [Y/N]: Yes   PROGRESS NOTE:   Authored by Jose Lomax MD  Internal Medicine    Patient is a 58y old  Female who presents with a chief complaint of Severe abdominal pain s/p ERCP yesterday (28 Jul 2023 09:39)    SUBJECTIVE / OVERNIGHT EVENTS:  Wheezing and spO2 of 89 overnight.  Duonebs ordered but not given. Overnight nursing unable to get hold of respiratory therapy?  Patient seen and examined  Patient reports sensation to the abdomen/epigastric region but unsure if it is pain or distension/fluid build up due to fluids.   No nausea, vomiting.   Denies shortness of breath. However, has had cough with clear sputum.   Edema of the lower extremities present.   No chest pain, no dysuria, fevers, chills.     MEDICATIONS  (STANDING):  albuterol/ipratropium for Nebulization 3 milliLiter(s) Nebulizer every 6 hours  amLODIPine   Tablet 5 milliGRAM(s) Oral daily  famotidine    Tablet 20 milliGRAM(s) Oral at bedtime  lactated ringers. 1000 milliLiter(s) (100 mL/Hr) IV Continuous <Continuous>  pantoprazole  Injectable 40 milliGRAM(s) IV Push daily  pantoprazole  Injectable      piperacillin/tazobactam IVPB.. 3.375 Gram(s) IV Intermittent every 8 hours  polyethylene glycol 3350 17 Gram(s) Oral daily  potassium phosphate IVPB 30 milliMole(s) IV Intermittent once  senna 1 Tablet(s) Oral at bedtime    MEDICATIONS  (PRN):  acetaminophen     Tablet .. 650 milliGRAM(s) Oral every 6 hours PRN Temp greater or equal to 38C (100.4F), Mild Pain (1 - 3)  aluminum hydroxide/magnesium hydroxide/simethicone Suspension 30 milliLiter(s) Oral every 4 hours PRN Heartburn  bisacodyl Suppository 10 milliGRAM(s) Rectal daily PRN Constipation  HYDROmorphone  Injectable 1 milliGRAM(s) IV Push every 3 hours PRN Moderate Pain (4 - 6)  HYDROmorphone  Injectable 2 milliGRAM(s) IV Push every 3 hours PRN Severe Pain (7 - 10)  melatonin 3 milliGRAM(s) Oral at bedtime PRN Insomnia  ondansetron Injectable 4 milliGRAM(s) IV Push every 8 hours PRN Nausea and/or Vomiting  prochlorperazine   Injectable 5 milliGRAM(s) IV Push every 4 hours PRN Nausea & Vomiting  simethicone 80 milliGRAM(s) Chew every 6 hours PRN Gas  sorbitol 70%/mineral oil/magnesium hydroxide/glycerin Enema 120 milliLiter(s) Rectal daily PRN Constipation    CAPILLARY BLOOD GLUCOSE    I&O's Summary    28 Jul 2023 07:01  -  29 Jul 2023 07:00  --------------------------------------------------------  IN: 1980 mL / OUT: 1950 mL / NET: 30 mL    PHYSICAL EXAM:  Vital Signs Last 24 Hrs  T(C): 37.3 (29 Jul 2023 06:10), Max: 37.8 (28 Jul 2023 21:37)  T(F): 99.1 (29 Jul 2023 06:10), Max: 100.1 (28 Jul 2023 21:37)  HR: 125 (29 Jul 2023 06:10) (113 - 129)  BP: 141/82 (29 Jul 2023 06:10) (124/75 - 147/85)  BP(mean): --  RR: 18 (29 Jul 2023 06:10) (17 - 19)  SpO2: 96% (29 Jul 2023 06:10) (95% - 100%)    Parameters below as of 29 Jul 2023 06:10  Patient On (Oxygen Delivery Method): room air    CONSTITUTIONAL: Well-groomed, in no apparent distress. Walking around room.   EYES: No conjunctival or scleral injection, non-icteric;   ENMT: No external nasal lesions; MMM  RESPIRATORY: Breathing comfortably; no dullness to percussion; (+) wheezing noted bilaterally throughout lung fields.   CARDIOVASCULAR: +S1S2, RRR, no M/G/R; pedal pulses full and symmetric; (+) bilateral lower extremity edema.   GASTROINTESTINAL: (+) significant abdominal distention, (+) generalized tenderness, more apparent to the epigastric region,  +BS throughout  PSYCHIATRIC: A+O x 3; mood and affect appropriate; appropriate insight and judgment    LABS:                        11.7   11.09 )-----------( 175      ( 29 Jul 2023 06:15 )             35.5     07-29    132<L>  |  96<L>  |  8   ----------------------------<  95  3.4<L>   |  25  |  0.60    Ca    7.5<L>      29 Jul 2023 06:15  Phos  1.7     07-29  Mg     2.00     07-29    TPro  5.5<L>  /  Alb  2.7<L>  /  TBili  0.9  /  DBili  x   /  AST  22  /  ALT  24  /  AlkPhos  56  07-29          Urinalysis Basic - ( 29 Jul 2023 06:15 )    Color: x / Appearance: x / SG: x / pH: x  Gluc: 95 mg/dL / Ketone: x  / Bili: x / Urobili: x   Blood: x / Protein: x / Nitrite: x   Leuk Esterase: x / RBC: x / WBC x   Sq Epi: x / Non Sq Epi: x / Bacteria: x        Culture - Urine (collected 26 Jul 2023 16:40)  Source: Clean Catch Clean Catch (Midstream)  Final Report (27 Jul 2023 23:25):    <10,000 CFU/mL Normal Urogenital Zulma        RADIOLOGY & ADDITIONAL TESTS:  ACC: [id number] EXAM:  XR CHEST PORTABLE ROUTINE 1V   ORDERED BY: JOSE LOMAX     PROCEDURE DATE:  07/28/2023          INTERPRETATION:  AP chest on July 28, 2023 at 3:31 PM. Patient has   pancreatitis with cough and productive sputum.    COMPARISON: None available.    Heart size is within normal limits.    There are bibasilar lower lung infiltrates left greater than right with   associated pleural reactions which are small.    IMPRESSION: Bibasilar infiltrates with associated small pleural   reactions. This is greater on the right.    --- End of Report ---        COORDINATION OF CARE:  Care Discussed with Consultants/Other Providers [Y/N]: Yes, GI  Prior or Outpatient Records Reviewed [Y/N]: Yes

## 2023-07-29 NOTE — CONSULT NOTE ADULT - ASSESSMENT
Patient is a 58 year old F w/ PMHx of GERD, HTN, s/p cholecystectomy in 2013 who presented to the ED on 6/25 with severe sharp 10/10 generalized epigastric pain radiating to her back upon waking this morning found to have post- ERCP pancreatitis. Patient underwent EGD/EUS/ERCP with Dr. Park GI, on 7/24/2023 that revealed 2 cm hiatal hernia, copious CBD sludge, and CBD dilatation to ampulla, stenotic papilla, 5F x 6cm single pigtail PD stent, biliary sphincterotomy, and sludge/small fragments removed using balloon catheter.  Patient initially tolerated procedure well. Patient recovering on the floor, however developed worsening abdominal pain and difficulty breathing found to have necrotizing pancreatitis of the anterior pancreatic head, no collections. Increased mild to moderate ascites and small bilateral pleural effusions. Surgery consulted for evaluation.    Recommendations:   - No acute surgical intervention indicated      - Patient HDS, afebrile, leukocytosis improving on abx, LFTs wnl, abdominal pain stable  - Continue medical management of pancreatitis   - Multimodal pain control: consider basal tylenol + toradol. prn opiates   - Okay to advance diet as tolerated   - Will continue to monitor closely   - Remainder care per primary     Plan discussed with Dr. Dwain MENDIOLA Team Surgery   [id number]

## 2023-07-29 NOTE — PROGRESS NOTE ADULT - PROBLEM SELECTOR PLAN 6
- Continue home Amlodipine 5 mg PO daily DVT ppx: SCDs, promote movement  GI ppx: Pantoprazole 40 mg daily, Pepcid 20 mg at bedtime .   Diet: Clear liquid, advance as tolerated.

## 2023-07-29 NOTE — PROGRESS NOTE ADULT - PROBLEM SELECTOR PLAN 3
- Noted to have HRs up to 130   - EKG shows ST @ 126 BPM, normal axis, no acute ST-T changes, QTc within normal limits on (07/26)  - Likely due to pain  - HR of 118 this morning, after walking in room (07/28) - Noted to have O2 sat of 89-92% on room air  - Patient has thick nail polish, moved pulse ox reader to earlobe, O2 sat improved to 92%  - Instructed patient to get up and move to chair from bed. O2 improved to 96-98%  - Transient hypoxia likely a result of positioning/splinting.   - Denies shortness of breath.  - Saturating 97%-98% on room air consistently (07/27-07/28)  - US DVT is negative

## 2023-07-29 NOTE — PROGRESS NOTE ADULT - PROBLEM SELECTOR PLAN 1
- symptoms began 1 day s/p ERCP (07/24), likely induced to recent procedure  - Given no resolution of symptoms s/p 3 day admissions with 21% bands on labs, repeat CT abdomen/pelvis w IV contrast ordered. Zosyn started empirically.    - Dr. Park, GI, is following. Recs appreciated  - Follow up infectious workup; blood cultures, UA, urine culture  - Trend CBC, BMP, coags.   - Zofran 4 mg q 8 hours PRN for nausea   - Prochlorperazine 5 mg IV q4 hours PRN for nausea  - Pain management:   Mild pain: Tylenol 650 mg PO PRN q 6 hours    Moderate pain: Dilaudid 1 mg IV PRN q 3 hours    Severe pain: Dilaudid 2 mg IV PRN q 3 hours   - IVF hydration; LR at 100 cc/hour standing.  - Incentive spirometry  - Encouraged PO intake   - advance diet as tolerated - symptoms began 1 day s/p ERCP (07/24), likely induced to recent procedure  - Given no resolution of symptoms s/p 3 day admissions with 21% bands on labs, repeat CT abdomen/pelvis w IV contrast ordered. Zosyn started empirically.  - D/c fluids due to hypervolemia on exam (07/29)  - Dr. Park, GI, is following. Recs appreciated  - Follow up infectious workup; blood cultures, UA, urine culture  - Trend CBC, BMP, coags.   - Zofran 4 mg q 8 hours PRN for nausea   - Prochlorperazine 5 mg IV q4 hours PRN for nausea  - Pain management:   Mild pain: Tylenol 650 mg PO PRN q 6 hours    Moderate pain: Dilaudid 1 mg IV PRN q 3 hours    Severe pain: Dilaudid 2 mg IV PRN q 3 hours   - Incentive spirometry  - Encouraged PO intake   - advance diet as tolerated

## 2023-07-29 NOTE — PROGRESS NOTE ADULT - SUBJECTIVE AND OBJECTIVE BOX
Interval Events:   No acute events overnight.  Patient endorses improvement in sharp epigastric pain that brought her to the hospital, however endorses issues with abdominal distention, peripheral edema, and wheezing overnight.    ROS:   12 point review of systems performed and negative except otherwise noted in HPI.    Hospital Medications:  acetaminophen     Tablet .. 650 milliGRAM(s) Oral every 6 hours PRN  albuterol/ipratropium for Nebulization 3 milliLiter(s) Nebulizer every 6 hours  aluminum hydroxide/magnesium hydroxide/simethicone Suspension 30 milliLiter(s) Oral every 4 hours PRN  amLODIPine   Tablet 5 milliGRAM(s) Oral daily  bisacodyl Suppository 10 milliGRAM(s) Rectal daily PRN  famotidine    Tablet 20 milliGRAM(s) Oral at bedtime  HYDROmorphone  Injectable 1 milliGRAM(s) IV Push every 3 hours PRN  HYDROmorphone  Injectable 2 milliGRAM(s) IV Push every 3 hours PRN  melatonin 3 milliGRAM(s) Oral at bedtime PRN  ondansetron Injectable 4 milliGRAM(s) IV Push every 8 hours PRN  pantoprazole  Injectable 40 milliGRAM(s) IV Push daily  pantoprazole  Injectable      piperacillin/tazobactam IVPB.. 3.375 Gram(s) IV Intermittent every 8 hours  polyethylene glycol 3350 17 Gram(s) Oral daily  prochlorperazine   Injectable 5 milliGRAM(s) IV Push every 4 hours PRN  senna 1 Tablet(s) Oral at bedtime  simethicone 80 milliGRAM(s) Chew every 6 hours PRN  sorbitol 70%/mineral oil/magnesium hydroxide/glycerin Enema 120 milliLiter(s) Rectal daily PRN      PHYSICAL EXAM:   Vital Signs:  Vital Signs Last 24 Hrs  T(C): 37.3 (29 Jul 2023 06:10), Max: 37.8 (28 Jul 2023 21:37)  T(F): 99.1 (29 Jul 2023 06:10), Max: 100.1 (28 Jul 2023 21:37)  HR: 135 (29 Jul 2023 10:04) (109 - 136)  BP: 140/91 (29 Jul 2023 09:40) (132/77 - 147/85)  BP(mean): --  RR: 18 (29 Jul 2023 09:40) (18 - 19)  SpO2: 94% (29 Jul 2023 10:04) (94% - 100%)    Parameters below as of 29 Jul 2023 10:04  Patient On (Oxygen Delivery Method): room air      Daily     Daily     GENERAL: no acute distress  NEURO: alert  HEENT: NCAT, no conjunctival pallor appreciated  CHEST: no respiratory distress, no accessory muscle use  CARDIAC: regular rate, +S1/S2  ABDOMEN: soft, epigastric tenderness present but improved from prior, no rebound or guarding  EXTREMITIES: warm, well perfused, edematous  SKIN: no lesions noted    LABS: reviewed                        11.7   11.09 )-----------( 175      ( 29 Jul 2023 06:15 )             35.5     07-29    132<L>  |  96<L>  |  8   ----------------------------<  95  3.4<L>   |  25  |  0.60    Ca    7.5<L>      29 Jul 2023 06:15  Phos  1.7     07-29  Mg     2.00     07-29    TPro  5.5<L>  /  Alb  2.7<L>  /  TBili  0.9  /  DBili  x   /  AST  22  /  ALT  24  /  AlkPhos  56  07-29    LIVER FUNCTIONS - ( 29 Jul 2023 06:15 )  Alb: 2.7 g/dL / Pro: 5.5 g/dL / ALK PHOS: 56 U/L / ALT: 24 U/L / AST: 22 U/L / GGT: x             Interval Diagnostic Studies: see sunrise for full report

## 2023-07-29 NOTE — PROGRESS NOTE ADULT - PROBLEM SELECTOR PLAN 5
- XR abdomen with no sign of obstruction  - Enema available as per patient preference   - Standing Miralax and Senna  - PRN Simethicone, Maalox, Dulcolax suppository  - PRN enema - Continue home Amlodipine 5 mg PO daily

## 2023-07-29 NOTE — PROGRESS NOTE ADULT - ASSESSMENT
58 year old female with history of HTN, GERD, s/p cholecystectomy in 2013 presents with post ERCP pancreatitis, hospital course complicated by UTI and constipation. Noted to be tachycardic (130s HR) and hypoxic (89-92% on RA) on 07 likely due to splinting and pain.      58 year old female with history of HTN, GERD, s/p cholecystectomy in 2013 presents with post ERCP pancreatitis, hospital course complicated by urinary frequency and constipation. Noted to be tachycardic (130s HR) and hypoxic (89-92% on RA) on 07 likely due to splinting and pain.

## 2023-07-29 NOTE — CONSULT NOTE ADULT - SUBJECTIVE AND OBJECTIVE BOX
HPI:  HAY ROMAN is a 58 year old female with history of HTN, GERD, and elevated liver chemistries who presents with abdominal pain after ERCP.    Patient has history of intermittent abdominal pain and elevated liver chemistries, eventually prompting EGD/EUS/ERCP on 7/24/2023 that revealed 2cm hiatal hernia, copious CBD sludge, and CBD dilatation to ampulla, stenotic papilla, 5F x 6cm single pigtail PD stent, biliary sphincterotomy, and sludge/small fragments removed using balloon catheter.  The following day, patient presents with severe, sharp epigastric pain.  No radiation to back.  Otherwise, patient denies fevers, chills, weight loss, dysphagia, odynophagia, early satiety, diarrhea, melena, hematemesis, hematochezia.    ROS:   General:  No fevers, chills, night sweats, fatigue  Eyes:  Good vision, no reported pain  ENT:  No sore throat, pain, runny nose  CV:  No pain, palpitations  Pulm:  No dyspnea, cough  GI:  See HPI, otherwise negative  :  No  incontinence, nocturia  Muscle:  No pain, weakness  Neuro:  No memory problems  Psych:  No insomnia, mood problems, depression  Endocrine:  No polyuria, polydipsia, cold/heat intolerance  Heme:  No petechiae, ecchymosis, easy bruisability  Skin:  No rash    PMHX/PSHX:    Hypertension    GERD (gastroesophageal reflux disease)    Lumbar herniated disc    Abnormal levels of other serum enzymes    Osteoarthritis    History of cholecystectomy    Left trimalleolar fracture, sequela    H/O colonoscopy      Allergies:  No Known Allergies      Home Medications: reviewed  Hospital Medications:  acetaminophen     Tablet .. 650 milliGRAM(s) Oral every 6 hours PRN  aluminum hydroxide/magnesium hydroxide/simethicone Suspension 30 milliLiter(s) Oral every 4 hours PRN  lactated ringers. 1000 milliLiter(s) IV Continuous <Continuous>  melatonin 3 milliGRAM(s) Oral at bedtime PRN  ondansetron Injectable 4 milliGRAM(s) IV Push every 8 hours PRN      Social History:   Tobacco: denies  Alcohol: denies  Recreational drugs: denies    Family history:    FH: hypertension (Father, Mother)    FH: heart disease (Mother)    FH: lung cancer (Father)      Denies family history of colon cancer/polyps, stomach cancer/polyps, pancreatic cancer/masses, liver cancer/disease, ovarian cancer and endometrial cancer.    PHYSICAL EXAM:   Vital Signs:  Vital Signs Last 24 Hrs  T(C): 36.6 (25 Jul 2023 08:38), Max: 36.6 (25 Jul 2023 08:38)  T(F): 97.9 (25 Jul 2023 08:38), Max: 97.9 (25 Jul 2023 08:38)  HR: 86 (25 Jul 2023 12:40) (73 - 86)  BP: 133/86 (25 Jul 2023 12:40) (113/91 - 133/86)  BP(mean): --  RR: 17 (25 Jul 2023 12:40) (17 - 18)  SpO2: 100% (25 Jul 2023 12:40) (100% - 100%)    Parameters below as of 25 Jul 2023 12:40  Patient On (Oxygen Delivery Method): room air      Daily Height in cm: 165.1 (25 Jul 2023 08:38)    Daily     GENERAL: no acute distress  NEURO: alert  HEENT: NCAT, no conjunctival pallor appreciated  CHEST: no respiratory distress, no accessory muscle use  CARDIAC: regular rate, +S1/S2  ABDOMEN: soft, epigastric tenderness, no rebound or guarding  EXTREMITIES: warm, well perfused  SKIN: no lesions noted    LABS: reviewed                        14.9   10.38 )-----------( 213      ( 25 Jul 2023 09:41 )             43.7     07-25    138  |  103  |  10  ----------------------------<  118<H>  4.1   |  19<L>  |  0.71    Ca    9.2      25 Jul 2023 09:41  Phos  3.9     07-25  Mg     1.90     07-25    TPro  6.6  /  Alb  3.9  /  TBili  0.6  /  DBili  x   /  AST  21  /  ALT  24  /  AlkPhos  68  07-25    LIVER FUNCTIONS - ( 25 Jul 2023 09:41 )  Alb: 3.9 g/dL / Pro: 6.6 g/dL / ALK PHOS: 68 U/L / ALT: 24 U/L / AST: 21 U/L / GGT: x               Diagnostic Studies: see sunrise for full report        
GENERAL SURGERY CONSULT NOTE      HPI:  Patient is a 58 year old F w/ PMHx of GERD, HTN, s/p cholecystectomy in 2013 who presented to the ED on 6/25 with severe sharp 10/10 generalized epigastric pain radiating to her back upon waking this morning found to have post- ERCP pancreatitis. Patient underwent EGD/EUS/ERCP with Dr. Park GI, on 7/24/2023 that revealed 2 cm hiatal hernia, copious CBD sludge, and CBD dilatation to ampulla, stenotic papilla, 5F x 6cm single pigtail PD stent, biliary sphincterotomy, and sludge/small fragments removed using balloon catheter.  Patient initially tolerated procedure well. Patient recovering on the floor, however yesterday developed worsening abdominal pain and difficulty breathing.     CT A/P on 7/29 shows necrotizing pancreatitis of the anterior pancreatic head, no collections. Increased mild to moderate ascites and small bilateral pleural effusions. Surgery consulted for evaluation. Patient continues to endorse sever intermittent abdominal pain, however stable. Continues to pass flatus/+BM. No N/V. Tolerating FLD         PAST MEDICAL HISTORY:  Hypertension    GERD (gastroesophageal reflux disease)    Lumbar herniated disc    Abnormal levels of other serum enzymes    Osteoarthritis        PAST SURGICAL HISTORY:  History of cholecystectomy    Left trimalleolar fracture, sequela    H/O colonoscopy        MEDICATIONS:  acetaminophen     Tablet .. 650 milliGRAM(s) Oral every 6 hours PRN  albuterol/ipratropium for Nebulization 3 milliLiter(s) Nebulizer every 6 hours  aluminum hydroxide/magnesium hydroxide/simethicone Suspension 30 milliLiter(s) Oral every 4 hours PRN  amLODIPine   Tablet 5 milliGRAM(s) Oral daily  bisacodyl Suppository 10 milliGRAM(s) Rectal daily PRN  famotidine    Tablet 20 milliGRAM(s) Oral at bedtime  HYDROmorphone  Injectable 1 milliGRAM(s) IV Push every 3 hours PRN  HYDROmorphone  Injectable 2 milliGRAM(s) IV Push every 3 hours PRN  ketorolac   Injectable 15 milliGRAM(s) IV Push every 6 hours  melatonin 3 milliGRAM(s) Oral at bedtime PRN  ondansetron Injectable 4 milliGRAM(s) IV Push every 8 hours PRN  pantoprazole  Injectable      pantoprazole  Injectable 40 milliGRAM(s) IV Push daily  piperacillin/tazobactam IVPB.. 3.375 Gram(s) IV Intermittent every 8 hours  polyethylene glycol 3350 17 Gram(s) Oral daily  prochlorperazine   Injectable 5 milliGRAM(s) IV Push every 4 hours PRN  senna 1 Tablet(s) Oral at bedtime  simethicone 80 milliGRAM(s) Chew every 6 hours PRN  sorbitol 70%/mineral oil/magnesium hydroxide/glycerin Enema 120 milliLiter(s) Rectal daily PRN      ALLERGIES:  No Known Allergies      VITALS & I/Os:  Vital Signs Last 24 Hrs  T(C): 37.3 (29 Jul 2023 06:10), Max: 37.8 (28 Jul 2023 21:37)  T(F): 99.1 (29 Jul 2023 06:10), Max: 100.1 (28 Jul 2023 21:37)  HR: 135 (29 Jul 2023 10:04) (109 - 136)  BP: 140/91 (29 Jul 2023 09:40) (132/77 - 147/85)  BP(mean): --  RR: 18 (29 Jul 2023 09:40) (18 - 19)  SpO2: 94% (29 Jul 2023 10:04) (94% - 100%)    Parameters below as of 29 Jul 2023 10:04  Patient On (Oxygen Delivery Method): room air        I&O's Summary    28 Jul 2023 07:01  -  29 Jul 2023 07:00  --------------------------------------------------------  IN: 1980 mL / OUT: 1950 mL / NET: 30 mL        PHYSICAL EXAM:  General: No acute distress  Respiratory: on 1L NC   Cardiovascular: RRR  Abdominal: Minimally tender to palpation diffusely, distractable. Soft, nondistended. No rebound or guarding. No organomegaly, no palpable mass.  Extremities: Warm    LABS:                        11.7   11.09 )-----------( 175      ( 29 Jul 2023 06:15 )             35.5     07-29    132<L>  |  96<L>  |  8   ----------------------------<  95  3.4<L>   |  25  |  0.60    Ca    7.5<L>      29 Jul 2023 06:15  Phos  1.7     07-29  Mg     2.00     07-29    TPro  5.5<L>  /  Alb  2.7<L>  /  TBili  0.9  /  DBili  x   /  AST  22  /  ALT  24  /  AlkPhos  56  07-29    Lactate:              Urinalysis Basic - ( 29 Jul 2023 06:15 )    Color: x / Appearance: x / SG: x / pH: x  Gluc: 95 mg/dL / Ketone: x  / Bili: x / Urobili: x   Blood: x / Protein: x / Nitrite: x   Leuk Esterase: x / RBC: x / WBC x   Sq Epi: x / Non Sq Epi: x / Bacteria: x        IMAGING:

## 2023-07-29 NOTE — PROGRESS NOTE ADULT - PROBLEM SELECTOR PLAN 7
DVT ppx: SCDs, promote movement  GI ppx: Pantoprazole 40 mg daily, Pepcid 20 mg at bedtime .   Diet: Clear liquid, advance as tolerated.

## 2023-07-29 NOTE — CONSULT NOTE ADULT - ATTENDING COMMENTS
Patient with necrotizing pancreatitis s/p ercp. Patient hemodynamically stable, with pleural effusions, with bisap score of 1.   medical management   supportive care  serial abdominal exams  early enteral nutrition  no abx  will continue to follow    I have personally interviewed and examined this patient, reviewed pertinent labs and imaging, and discussed the case with colleagues, residents, and physician assistants on B Team rounds.    The active care issues are:  1. necrotizing pancreatitis    The Acute Care Surgery (B Team) Attending Group Practice:  Dr. Pedro Villagomez, Dr. Dung Ramos, Dr. Kenny Ling, Dr. Jarett Prakash,     urgent issues - spectra [id number]  nonurgent issues - [phone]  patient appointments or afterhours - [phone]

## 2023-07-29 NOTE — PROGRESS NOTE ADULT - PROBLEM SELECTOR PLAN 2
- Likely due to constipation, symptoms improved s/p bowel movement with enema.  - UA shows leuk esterase and bacteria, however epithelial cells. Likely not a clean catch.   - Urine culture shows no growth, Patient has received 3 doses of Ceftriaxone during admission.  - Patient remains afebrile, low likelihood of UTI, will discontinue Ceftriaxone. - Noted to have HRs up to 130   - EKG shows ST @ 126 BPM, normal axis, no acute ST-T changes, QTc within normal limits on (07/26)  - Likely due to pain  - HR of 118 this morning, after walking in room (07/28)

## 2023-07-29 NOTE — PROGRESS NOTE ADULT - ASSESSMENT
58 year old female with history of HTN, GERD, and elevated liver chemistries who presents with abdominal pain after ERCP.    # Post-ERCP pancreatitis  -EGD/EUS/ERCP on 7/24/2023 that revealed 2cm hiatal hernia, copious CBD sludge, and CBD dilatation to ampulla, stenotic papilla, 5F x 6cm single pigtail PD stent, biliary sphincterotomy, and sludge/small fragments removed using balloon catheter  -upon admission, severe/sharp epigastric pain, lipase >3000, liver chemistries normal, CT A/P with severe acute interstitial edematous pancreatitis with extensive peripancreatic edema/fluid    Recommendations:  -trend clinical symptoms, exam findings, vital signs, CBC, CMP, INR  -discontinue IVF given edema, wheezing, and pulmonary infiltrates on CXR  -f/u read of CT A/P  -pain control and antiemetics as QTc allows  -UTI workup/management per primary team  -enemas as needed for constipation    Note incomplete until finalized by attending signature/attestation.    Toan Murillo  GI/Hepatology Fellow    MONDAY-FRIDAY 8AM-5PM:  Pager# [phone] ([facility]) or [phone] ([facility])    NON-URGENT CONSULTS:  Please email [email or web address]  AT NIGHT AND ON WEEKENDS:  Contact on-call GI fellow via answering service ([phone]) from 5pm-8am and on weekends/holidays

## 2023-07-29 NOTE — PROGRESS NOTE ADULT - PROBLEM SELECTOR PLAN 4
- Noted to have O2 sat of 89-92% on room air  - Patient has thick nail polish, moved pulse ox reader to earlobe, O2 sat improved to 92%  - Instructed patient to get up and move to chair from bed. O2 improved to 96-98%  - Transient hypoxia likely a result of positioning/splinting.   - Denies shortness of breath.  - Saturating 97%-98% on room air consistently (07/27-07/28)  - US DVT is negative - XR abdomen with no sign of obstruction  - Enema available as per patient preference   - Standing Miralax and Senna  - PRN Simethicone, Maalox, Dulcolax suppository  - PRN enema

## 2023-07-30 LAB
ALBUMIN SERPL ELPH-MCNC: 2.8 G/DL — LOW (ref 3.3–5)
ALP SERPL-CCNC: 70 U/L — SIGNIFICANT CHANGE UP (ref 40–120)
ALT FLD-CCNC: 28 U/L — SIGNIFICANT CHANGE UP (ref 4–33)
ANION GAP SERPL CALC-SCNC: 13 MMOL/L — SIGNIFICANT CHANGE UP (ref 7–14)
AST SERPL-CCNC: 26 U/L — SIGNIFICANT CHANGE UP (ref 4–32)
BASOPHILS # BLD AUTO: 0.04 K/UL — SIGNIFICANT CHANGE UP (ref 0–0.2)
BASOPHILS NFR BLD AUTO: 0.4 % — SIGNIFICANT CHANGE UP (ref 0–2)
BILIRUB SERPL-MCNC: 0.9 MG/DL — SIGNIFICANT CHANGE UP (ref 0.2–1.2)
BUN SERPL-MCNC: 7 MG/DL — SIGNIFICANT CHANGE UP (ref 7–23)
CALCIUM SERPL-MCNC: 8.2 MG/DL — LOW (ref 8.4–10.5)
CHLORIDE SERPL-SCNC: 97 MMOL/L — LOW (ref 98–107)
CO2 SERPL-SCNC: 25 MMOL/L — SIGNIFICANT CHANGE UP (ref 22–31)
CREAT SERPL-MCNC: 0.65 MG/DL — SIGNIFICANT CHANGE UP (ref 0.5–1.3)
CULTURE RESULTS: SIGNIFICANT CHANGE UP
EGFR: 102 ML/MIN/1.73M2 — SIGNIFICANT CHANGE UP
EOSINOPHIL # BLD AUTO: 0.19 K/UL — SIGNIFICANT CHANGE UP (ref 0–0.5)
EOSINOPHIL NFR BLD AUTO: 1.9 % — SIGNIFICANT CHANGE UP (ref 0–6)
GLUCOSE SERPL-MCNC: 93 MG/DL — SIGNIFICANT CHANGE UP (ref 70–99)
HCT VFR BLD CALC: 34.2 % — LOW (ref 34.5–45)
HGB BLD-MCNC: 11.3 G/DL — LOW (ref 11.5–15.5)
IANC: 7.53 K/UL — HIGH (ref 1.8–7.4)
IMM GRANULOCYTES NFR BLD AUTO: 1.4 % — HIGH (ref 0–0.9)
LYMPHOCYTES # BLD AUTO: 0.85 K/UL — LOW (ref 1–3.3)
LYMPHOCYTES # BLD AUTO: 8.5 % — LOW (ref 13–44)
MAGNESIUM SERPL-MCNC: 2.1 MG/DL — SIGNIFICANT CHANGE UP (ref 1.6–2.6)
MCHC RBC-ENTMCNC: 30.6 PG — SIGNIFICANT CHANGE UP (ref 27–34)
MCHC RBC-ENTMCNC: 33 GM/DL — SIGNIFICANT CHANGE UP (ref 32–36)
MCV RBC AUTO: 92.7 FL — SIGNIFICANT CHANGE UP (ref 80–100)
MONOCYTES # BLD AUTO: 1.2 K/UL — HIGH (ref 0–0.9)
MONOCYTES NFR BLD AUTO: 12.1 % — SIGNIFICANT CHANGE UP (ref 2–14)
NEUTROPHILS # BLD AUTO: 7.53 K/UL — HIGH (ref 1.8–7.4)
NEUTROPHILS NFR BLD AUTO: 75.7 % — SIGNIFICANT CHANGE UP (ref 43–77)
NRBC # BLD: 0 /100 WBCS — SIGNIFICANT CHANGE UP (ref 0–0)
NRBC # FLD: 0 K/UL — SIGNIFICANT CHANGE UP (ref 0–0)
PHOSPHATE SERPL-MCNC: 2.3 MG/DL — LOW (ref 2.5–4.5)
PLATELET # BLD AUTO: 200 K/UL — SIGNIFICANT CHANGE UP (ref 150–400)
POTASSIUM SERPL-MCNC: 4.1 MMOL/L — SIGNIFICANT CHANGE UP (ref 3.5–5.3)
POTASSIUM SERPL-SCNC: 4.1 MMOL/L — SIGNIFICANT CHANGE UP (ref 3.5–5.3)
PROT SERPL-MCNC: 5.7 G/DL — LOW (ref 6–8.3)
RBC # BLD: 3.69 M/UL — LOW (ref 3.8–5.2)
RBC # FLD: 12.9 % — SIGNIFICANT CHANGE UP (ref 10.3–14.5)
SODIUM SERPL-SCNC: 135 MMOL/L — SIGNIFICANT CHANGE UP (ref 135–145)
SPECIMEN SOURCE: SIGNIFICANT CHANGE UP
WBC # BLD: 9.95 K/UL — SIGNIFICANT CHANGE UP (ref 3.8–10.5)
WBC # FLD AUTO: 9.95 K/UL — SIGNIFICANT CHANGE UP (ref 3.8–10.5)

## 2023-07-30 PROCEDURE — 99232 SBSQ HOSP IP/OBS MODERATE 35: CPT | Mod: GC

## 2023-07-30 RX ORDER — SODIUM,POTASSIUM PHOSPHATES 278-250MG
1 POWDER IN PACKET (EA) ORAL ONCE
Refills: 0 | Status: COMPLETED | OUTPATIENT
Start: 2023-07-30 | End: 2023-07-30

## 2023-07-30 RX ADMIN — Medication 15 MILLIGRAM(S): at 06:02

## 2023-07-30 RX ADMIN — HYDROMORPHONE HYDROCHLORIDE 2 MILLIGRAM(S): 2 INJECTION INTRAMUSCULAR; INTRAVENOUS; SUBCUTANEOUS at 14:46

## 2023-07-30 RX ADMIN — POLYETHYLENE GLYCOL 3350 17 GRAM(S): 17 POWDER, FOR SOLUTION ORAL at 11:47

## 2023-07-30 RX ADMIN — HYDROMORPHONE HYDROCHLORIDE 2 MILLIGRAM(S): 2 INJECTION INTRAMUSCULAR; INTRAVENOUS; SUBCUTANEOUS at 02:52

## 2023-07-30 RX ADMIN — Medication 15 MILLIGRAM(S): at 23:12

## 2023-07-30 RX ADMIN — Medication 15 MILLIGRAM(S): at 18:00

## 2023-07-30 RX ADMIN — PIPERACILLIN AND TAZOBACTAM 25 GRAM(S): 4; .5 INJECTION, POWDER, LYOPHILIZED, FOR SOLUTION INTRAVENOUS at 13:59

## 2023-07-30 RX ADMIN — SIMETHICONE 80 MILLIGRAM(S): 80 TABLET, CHEWABLE ORAL at 11:52

## 2023-07-30 RX ADMIN — Medication 3 MILLILITER(S): at 04:16

## 2023-07-30 RX ADMIN — Medication 15 MILLIGRAM(S): at 17:33

## 2023-07-30 RX ADMIN — HYDROMORPHONE HYDROCHLORIDE 2 MILLIGRAM(S): 2 INJECTION INTRAMUSCULAR; INTRAVENOUS; SUBCUTANEOUS at 06:02

## 2023-07-30 RX ADMIN — SIMETHICONE 80 MILLIGRAM(S): 80 TABLET, CHEWABLE ORAL at 18:14

## 2023-07-30 RX ADMIN — Medication 15 MILLIGRAM(S): at 06:32

## 2023-07-30 RX ADMIN — Medication 15 MILLIGRAM(S): at 12:02

## 2023-07-30 RX ADMIN — Medication 1 PACKET(S): at 11:48

## 2023-07-30 RX ADMIN — HYDROMORPHONE HYDROCHLORIDE 2 MILLIGRAM(S): 2 INJECTION INTRAMUSCULAR; INTRAVENOUS; SUBCUTANEOUS at 06:32

## 2023-07-30 RX ADMIN — HYDROMORPHONE HYDROCHLORIDE 2 MILLIGRAM(S): 2 INJECTION INTRAMUSCULAR; INTRAVENOUS; SUBCUTANEOUS at 21:40

## 2023-07-30 RX ADMIN — AMLODIPINE BESYLATE 5 MILLIGRAM(S): 2.5 TABLET ORAL at 06:02

## 2023-07-30 RX ADMIN — HYDROMORPHONE HYDROCHLORIDE 2 MILLIGRAM(S): 2 INJECTION INTRAMUSCULAR; INTRAVENOUS; SUBCUTANEOUS at 15:01

## 2023-07-30 RX ADMIN — PANTOPRAZOLE SODIUM 40 MILLIGRAM(S): 20 TABLET, DELAYED RELEASE ORAL at 11:47

## 2023-07-30 RX ADMIN — HYDROMORPHONE HYDROCHLORIDE 2 MILLIGRAM(S): 2 INJECTION INTRAMUSCULAR; INTRAVENOUS; SUBCUTANEOUS at 21:10

## 2023-07-30 RX ADMIN — FAMOTIDINE 20 MILLIGRAM(S): 10 INJECTION INTRAVENOUS at 21:10

## 2023-07-30 RX ADMIN — Medication 3 MILLILITER(S): at 22:08

## 2023-07-30 RX ADMIN — SENNA PLUS 1 TABLET(S): 8.6 TABLET ORAL at 21:10

## 2023-07-30 RX ADMIN — PIPERACILLIN AND TAZOBACTAM 25 GRAM(S): 4; .5 INJECTION, POWDER, LYOPHILIZED, FOR SOLUTION INTRAVENOUS at 21:11

## 2023-07-30 RX ADMIN — PIPERACILLIN AND TAZOBACTAM 25 GRAM(S): 4; .5 INJECTION, POWDER, LYOPHILIZED, FOR SOLUTION INTRAVENOUS at 06:00

## 2023-07-30 RX ADMIN — Medication 15 MILLIGRAM(S): at 11:47

## 2023-07-30 RX ADMIN — Medication 15 MILLIGRAM(S): at 23:42

## 2023-07-30 RX ADMIN — Medication 3 MILLILITER(S): at 10:56

## 2023-07-30 RX ADMIN — Medication 3 MILLILITER(S): at 16:29

## 2023-07-30 RX ADMIN — HYDROMORPHONE HYDROCHLORIDE 2 MILLIGRAM(S): 2 INJECTION INTRAMUSCULAR; INTRAVENOUS; SUBCUTANEOUS at 03:22

## 2023-07-30 NOTE — PROGRESS NOTE ADULT - PROBLEM SELECTOR PLAN 1
- symptoms began 1 day s/p ERCP (07/24), likely induced to recent procedure  - Given no resolution of symptoms s/p 3 day admissions with 21% bands on labs, repeat CT abdomen/pelvis w IV contrast ordered. Zosyn started empirically.  - D/c fluids due to hypervolemia on exam (07/29)  - Dr. Park, GI, is following. Recs appreciated  - Follow up infectious workup; blood cultures, UA, urine culture  - Trend CBC, BMP, coags.   - Zofran 4 mg q 8 hours PRN for nausea   - Prochlorperazine 5 mg IV q4 hours PRN for nausea  - Pain management:   Mild pain: Tylenol 650 mg PO PRN q 6 hours    Moderate pain: Dilaudid 1 mg IV PRN q 3 hours    Severe pain: Dilaudid 2 mg IV PRN q 3 hours   - Incentive spirometry  - Encouraged PO intake   - advance diet as tolerated - symptoms began 1 day s/p ERCP (07/24), likely induced to recent procedure  - Given no resolution of symptoms s/p 3 day admissions with 21% bands on labs.   - repeat CT abdomen/pelvis w IV contrast on 07/29 shows necrosis, general surgery consulted, no surgical intervention at this time.   - Zosyn empirically.  - D/c fluids due to hypervolemia on exam (07/29)  - Blood and urine cultures no growth to date.   - Trend CBC, BMP, coags.   - Zofran 4 mg q 8 hours PRN for nausea   - Prochlorperazine 5 mg IV q4 hours PRN for nausea  - Pain management:   Standing Toradol 15 mg   Mild pain: Tylenol 650 mg PO PRN q 6 hours    Moderate pain: Dilaudid 1 mg IV PRN q 3 hours    Severe pain: Dilaudid 2 mg IV PRN q 3 hours   - Incentive spirometry  - Encouraged PO intake   - advance diet as tolerated  - appreciate GI and general surgery recs.

## 2023-07-30 NOTE — PROGRESS NOTE ADULT - PROBLEM SELECTOR PLAN 3
- Noted to have O2 sat of 89-92% on room air  - Patient has thick nail polish, moved pulse ox reader to earlobe, O2 sat improved to 92%  - Instructed patient to get up and move to chair from bed. O2 improved to 96-98%  - Transient hypoxia likely a result of positioning/splinting.   - Denies shortness of breath.  - Saturating 97%-98% on room air consistently (07/27-07/28)  - US DVT is negative - Noted to have O2 sat of 89-92% on room air  - Instructed patient to get up and move to chair from bed. O2 improved to 96-98%  - Saturating 97%-98% on room air consistently (07/27-07/30),  - US DVT is negative  - CXR on 07/28: Bibasilar infiltrates with associated small pleural reactions. This is greater on the right.  finding likely due to IVF hydration. Fluids d/c on 07/29  - Initial episodes of hypoxia likely due to pain however now combination of splinting and pleural processes.   - Duonebs PRN

## 2023-07-30 NOTE — PROGRESS NOTE ADULT - SUBJECTIVE AND OBJECTIVE BOX
Interval Events:   No acute events overnight.  Patient ambulating in the room.  She endorses improvement in epigastric pain.  Still endorses some wheezing.    ROS:   12 point review of systems performed and negative except otherwise noted in HPI.    Hospital Medications:  acetaminophen     Tablet .. 650 milliGRAM(s) Oral every 6 hours PRN  albuterol/ipratropium for Nebulization 3 milliLiter(s) Nebulizer every 6 hours  aluminum hydroxide/magnesium hydroxide/simethicone Suspension 30 milliLiter(s) Oral every 4 hours PRN  amLODIPine   Tablet 5 milliGRAM(s) Oral daily  bisacodyl Suppository 10 milliGRAM(s) Rectal daily PRN  famotidine    Tablet 20 milliGRAM(s) Oral at bedtime  HYDROmorphone  Injectable 1 milliGRAM(s) IV Push every 3 hours PRN  HYDROmorphone  Injectable 2 milliGRAM(s) IV Push every 3 hours PRN  ketorolac   Injectable 15 milliGRAM(s) IV Push every 6 hours  melatonin 3 milliGRAM(s) Oral at bedtime PRN  ondansetron Injectable 4 milliGRAM(s) IV Push every 8 hours PRN  pantoprazole  Injectable 40 milliGRAM(s) IV Push daily  pantoprazole  Injectable      piperacillin/tazobactam IVPB.. 3.375 Gram(s) IV Intermittent every 8 hours  polyethylene glycol 3350 17 Gram(s) Oral daily  prochlorperazine   Injectable 5 milliGRAM(s) IV Push every 4 hours PRN  senna 1 Tablet(s) Oral at bedtime  simethicone 80 milliGRAM(s) Chew every 6 hours PRN  sorbitol 70%/mineral oil/magnesium hydroxide/glycerin Enema 120 milliLiter(s) Rectal daily PRN      PHYSICAL EXAM:   Vital Signs:  Vital Signs Last 24 Hrs  T(C): 36.4 (30 Jul 2023 10:21), Max: 37.2 (29 Jul 2023 14:00)  T(F): 97.5 (30 Jul 2023 10:21), Max: 99 (29 Jul 2023 22:06)  HR: 123 (30 Jul 2023 10:21) (100 - 131)  BP: 122/79 (30 Jul 2023 10:21) (122/79 - 135/78)  BP(mean): --  RR: 17 (30 Jul 2023 10:21) (16 - 20)  SpO2: 99% (30 Jul 2023 10:21) (91% - 99%)    Parameters below as of 30 Jul 2023 10:21  Patient On (Oxygen Delivery Method): room air      Daily     Daily     GENERAL: no acute distress  NEURO: alert  HEENT: NCAT, no conjunctival pallor appreciated  CHEST: no respiratory distress, no accessory muscle use  CARDIAC: regular rate, +S1/S2  ABDOMEN: soft, epigastric tenderness improved, no rebound or guarding  EXTREMITIES: warm, well perfused  SKIN: no lesions noted    LABS: reviewed                        11.3   9.95  )-----------( 200      ( 30 Jul 2023 06:02 )             34.2     07-30    135  |  97<L>  |  7   ----------------------------<  93  4.1   |  25  |  0.65    Ca    8.2<L>      30 Jul 2023 06:02  Phos  2.3     07-30  Mg     2.10     07-30    TPro  5.7<L>  /  Alb  2.8<L>  /  TBili  0.9  /  DBili  x   /  AST  26  /  ALT  28  /  AlkPhos  70  07-30    LIVER FUNCTIONS - ( 30 Jul 2023 06:02 )  Alb: 2.8 g/dL / Pro: 5.7 g/dL / ALK PHOS: 70 U/L / ALT: 28 U/L / AST: 26 U/L / GGT: x             Interval Diagnostic Studies: see sunrise for full report

## 2023-07-30 NOTE — PROGRESS NOTE ADULT - ASSESSMENT
58 year old female with history of HTN, GERD, and elevated liver chemistries who presents with abdominal pain after ERCP.    # Post-ERCP pancreatitis  -EGD/EUS/ERCP on 7/24/2023 that revealed 2cm hiatal hernia, copious CBD sludge, and CBD dilatation to ampulla, stenotic papilla, 5F x 6cm single pigtail PD stent, biliary sphincterotomy, and sludge/small fragments removed using balloon catheter  -upon admission, severe/sharp epigastric pain, lipase >3000, liver chemistries normal, CT A/P with severe acute interstitial edematous pancreatitis with extensive peripancreatic edema/fluid  -CT A/P 7/29/2023 with small hypoenhancement in pancreas c/w necrotizing pancreatitis, no pancreatic collections, mild to moderate ascites, and small bilateral pleural effusions    Recommendations:  -trend clinical symptoms, exam findings, vital signs, CBC, CMP, INR  -discontinued IVF given edema, wheezing, and pulmonary infiltrates on CXR  -would not recommend antibiotics for small hypoenhancement of pancreas read as necrotizing pancreatitis  -pain control and antiemetics as QTc allows, which patient is clinically improving    Note incomplete until finalized by attending signature/attestation.    Toan Murillo  GI/Hepatology Fellow    MONDAY-FRIDAY 8AM-5PM:  Pager# [phone] ([facility]) or [phone] ([facility])    NON-URGENT CONSULTS:  Please email [email or web address]  AT NIGHT AND ON WEEKENDS:  Contact on-call GI fellow via answering service ([phone]) from 5pm-8am and on weekends/holidays

## 2023-07-30 NOTE — PROGRESS NOTE ADULT - ASSESSMENT
58 year old female with history of HTN, GERD, s/p cholecystectomy in 2013 presents with post ERCP pancreatitis, hospital course complicated by urinary frequency and constipation. Noted to be tachycardic (130s HR) and hypoxic (89-92% on RA) on 07 likely due to splinting and pain.      58 year old female with history of HTN, GERD, s/p cholecystectomy in 2013 presents with post ERCP pancreatitis, hospital course complicated by opiate induced constipation. Noted to be tachycardic (130s HR) and hypoxic (89-92% on RA) on 07 likely due to splinting and pain. CTAP findings of necrosis on 07/29, however, no surgical intervention indicated at this time.

## 2023-07-30 NOTE — PROGRESS NOTE ADULT - PROBLEM SELECTOR PLAN 4
- XR abdomen with no sign of obstruction  - Enema available as per patient preference   - Standing Miralax and Senna  - PRN Simethicone, Maalox, Dulcolax suppository  - PRN enema

## 2023-07-30 NOTE — PROGRESS NOTE ADULT - PROBLEM SELECTOR PLAN 2
- Noted to have HRs up to 130   - EKG shows ST @ 126 BPM, normal axis, no acute ST-T changes, QTc within normal limits on (07/26)  - Likely due to pain  - HR of 118 this morning, after walking in room (07/28)

## 2023-07-30 NOTE — PROGRESS NOTE ADULT - SUBJECTIVE AND OBJECTIVE BOX
PROGRESS NOTE:   Authored by Jose Lomax MD  Internal Medicine      Patient is a 58y old  Female who presents with a chief complaint of Severe abdominal pain s/p ERCP yesterday (30 Jul 2023 06:55)    SUBJECTIVE / OVERNIGHT EVENTS:   NAEO, patient seen. Patient walking around room.   Patient states the pain has improved.   Was able to tolerate soup and scones she got from [employer] yesterday.   Wheezing has improved with Duoneb treatment.   Denies fever, chills, dysuria, hematuria, nausea, vomiting.     MEDICATIONS  (STANDING):  albuterol/ipratropium for Nebulization 3 milliLiter(s) Nebulizer every 6 hours  amLODIPine   Tablet 5 milliGRAM(s) Oral daily  famotidine    Tablet 20 milliGRAM(s) Oral at bedtime  ketorolac   Injectable 15 milliGRAM(s) IV Push every 6 hours  pantoprazole  Injectable 40 milliGRAM(s) IV Push daily  pantoprazole  Injectable      piperacillin/tazobactam IVPB.. 3.375 Gram(s) IV Intermittent every 8 hours  polyethylene glycol 3350 17 Gram(s) Oral daily  potassium phosphate / sodium phosphate Powder (PHOS-NaK) 1 Packet(s) Oral once  senna 1 Tablet(s) Oral at bedtime    MEDICATIONS  (PRN):  acetaminophen     Tablet .. 650 milliGRAM(s) Oral every 6 hours PRN Temp greater or equal to 38C (100.4F), Mild Pain (1 - 3)  aluminum hydroxide/magnesium hydroxide/simethicone Suspension 30 milliLiter(s) Oral every 4 hours PRN Heartburn  bisacodyl Suppository 10 milliGRAM(s) Rectal daily PRN Constipation  HYDROmorphone  Injectable 1 milliGRAM(s) IV Push every 3 hours PRN Moderate Pain (4 - 6)  HYDROmorphone  Injectable 2 milliGRAM(s) IV Push every 3 hours PRN Severe Pain (7 - 10)  melatonin 3 milliGRAM(s) Oral at bedtime PRN Insomnia  ondansetron Injectable 4 milliGRAM(s) IV Push every 8 hours PRN Nausea and/or Vomiting  prochlorperazine   Injectable 5 milliGRAM(s) IV Push every 4 hours PRN Nausea & Vomiting  simethicone 80 milliGRAM(s) Chew every 6 hours PRN Gas  sorbitol 70%/mineral oil/magnesium hydroxide/glycerin Enema 120 milliLiter(s) Rectal daily PRN Constipation      CAPILLARY BLOOD GLUCOSE        I&O's Summary    29 Jul 2023 07:01  -  30 Jul 2023 07:00  --------------------------------------------------------  IN: 340 mL / OUT: 2650 mL / NET: -2310 mL        PHYSICAL EXAM:  Vital Signs Last 24 Hrs  T(C): 37.1 (30 Jul 2023 06:02), Max: 37.2 (29 Jul 2023 14:00)  T(F): 98.7 (30 Jul 2023 06:02), Max: 99 (29 Jul 2023 22:06)  HR: 115 (30 Jul 2023 06:02) (100 - 136)  BP: 127/76 (30 Jul 2023 06:02) (124/82 - 140/91)  BP(mean): --  RR: 17 (30 Jul 2023 06:02) (16 - 20)  SpO2: 97% (30 Jul 2023 06:02) (91% - 99%)    Parameters below as of 30 Jul 2023 06:02  Patient On (Oxygen Delivery Method): room air      CONSTITUTIONAL: Well-groomed, in no apparent distress. Walking around room.   EYES: No conjunctival or scleral injection, non-icteric;   ENMT: No external nasal lesions; MMM  RESPIRATORY: Breathing comfortably; no dullness to percussion; Lungs CTA  CARDIOVASCULAR: +S1S2, RRR, no M/G/R; pedal pulses full and symmetric; (+) bilateral lower extremity edema.   GASTROINTESTINAL: (+) significant abdominal distention, (+) epigastric tenderness,  +BS throughout  PSYCHIATRIC: A+O x 3; mood and affect appropriate; appropriate insight and judgment    LABS:                        11.3   9.95  )-----------( 200      ( 30 Jul 2023 06:02 )             34.2     07-30    135  |  97<L>  |  7   ----------------------------<  93  4.1   |  25  |  0.65    Ca    8.2<L>      30 Jul 2023 06:02  Phos  2.3     07-30  Mg     2.10     07-30    TPro  5.7<L>  /  Alb  2.8<L>  /  TBili  0.9  /  DBili  x   /  AST  26  /  ALT  28  /  AlkPhos  70  07-30          Urinalysis Basic - ( 30 Jul 2023 06:02 )    Color: x / Appearance: x / SG: x / pH: x  Gluc: 93 mg/dL / Ketone: x  / Bili: x / Urobili: x   Blood: x / Protein: x / Nitrite: x   Leuk Esterase: x / RBC: x / WBC x   Sq Epi: x / Non Sq Epi: x / Bacteria: x          RADIOLOGY & ADDITIONAL TESTS:  ACC: [id number] EXAM:  CT ABDOMEN AND PELVIS IC   ORDERED BY: JOSE LOMAX     PROCEDURE DATE:  07/29/2023          INTERPRETATION:  CLINICAL INFORMATION: Pancreatitis status post ERCP,   continued abdominal pain, evaluate for necrotizing pancreatitis    COMPARISON: CT abdomen pelvis 7/25/2023    CONTRAST/COMPLICATIONS:  IV Contrast: Omnipaque 350  95 cc administered   5 cc discarded  Oral Contrast: NONE  Complications: None reported at time of study completion    PROCEDURE:  CT of the Abdomen and Pelvis was performed.  Arterial and Portal Venous phases were acquired.  Sagittal and coronal reformats were performed.    FINDINGS:  LOWER CHEST: New small bilateral pleural effusions with associated   atelectasis..    LIVER: Within normal limits.  BILE DUCTS: Normal caliber.  GALLBLADDER: Cholecystectomy.  SPLEEN: Similar innumerable hypodensities.  PANCREAS: Increased fluid surrounding the pancreas compared to prior.   Hypoenhancement of the anterior pancreatic head. No pancreatic ductal   dilatation. No loculated fluid collections.  ADRENALS: Within normal limits.  KIDNEYS/URETERS: Bilateral cysts. No hydronephrosis.    BLADDER: Within normal limits.  REPRODUCTIVE ORGANS: Uterus and adnexa within normal limits.    BOWEL: Small hiatal hernia. Pancreatic stent in the first colon. No bowel   obstruction. Appendix is not visualized. Slightly edematous sigmoid and   descending colon.  PERITONEUM: Increased mild to moderate ascites.  VESSELS: No splenic vein thrombosis. No splenic artery aneurysms.  RETROPERITONEUM/LYMPH NODES: No lymphadenopathy.  ABDOMINAL WALL: Increased subcutaneous edema in the lower anterior   abdominal wall and hip regions.  BONES: Degenerative changes.    IMPRESSION:    Necrotizing pancreatitis. Increased mild to moderate ascites and small   bilateral pleural effusions.    No pancreatic collections. No splenic vein thrombosis. No splenic artery   pseudoaneurysms.    The migrated pancreatic stent is now in the transverse colon.      COORDINATION OF CARE:  Care Discussed with Consultants/Other Providers [Y/N]: Yes, general surgery, GI  Prior or Outpatient Records Reviewed [Y/N]: Yes

## 2023-07-30 NOTE — PROGRESS NOTE ADULT - PROBLEM SELECTOR PLAN 6
DVT ppx: SCDs, promote movement  GI ppx: Pantoprazole 40 mg daily, Pepcid 20 mg at bedtime .   Diet: Clear liquid, advance as tolerated. DVT ppx: SCDs, promote movement  GI ppx: Pantoprazole 40 mg daily, Pepcid 20 mg at bedtime .   Diet: Regular diet

## 2023-07-31 LAB
ALBUMIN SERPL ELPH-MCNC: 3 G/DL — LOW (ref 3.3–5)
ALP SERPL-CCNC: 83 U/L — SIGNIFICANT CHANGE UP (ref 40–120)
ALT FLD-CCNC: 31 U/L — SIGNIFICANT CHANGE UP (ref 4–33)
ANION GAP SERPL CALC-SCNC: 16 MMOL/L — HIGH (ref 7–14)
AST SERPL-CCNC: 31 U/L — SIGNIFICANT CHANGE UP (ref 4–32)
BASOPHILS # BLD AUTO: 0.05 K/UL — SIGNIFICANT CHANGE UP (ref 0–0.2)
BASOPHILS NFR BLD AUTO: 0.5 % — SIGNIFICANT CHANGE UP (ref 0–2)
BILIRUB SERPL-MCNC: 0.7 MG/DL — SIGNIFICANT CHANGE UP (ref 0.2–1.2)
BUN SERPL-MCNC: 6 MG/DL — LOW (ref 7–23)
CA-I BLD-SCNC: 1.11 MMOL/L — LOW (ref 1.15–1.29)
CALCIUM SERPL-MCNC: 8.3 MG/DL — LOW (ref 8.4–10.5)
CHLORIDE SERPL-SCNC: 97 MMOL/L — LOW (ref 98–107)
CO2 SERPL-SCNC: 24 MMOL/L — SIGNIFICANT CHANGE UP (ref 22–31)
CREAT SERPL-MCNC: 0.64 MG/DL — SIGNIFICANT CHANGE UP (ref 0.5–1.3)
EGFR: 102 ML/MIN/1.73M2 — SIGNIFICANT CHANGE UP
EOSINOPHIL # BLD AUTO: 0.3 K/UL — SIGNIFICANT CHANGE UP (ref 0–0.5)
EOSINOPHIL NFR BLD AUTO: 2.8 % — SIGNIFICANT CHANGE UP (ref 0–6)
GLUCOSE SERPL-MCNC: 103 MG/DL — HIGH (ref 70–99)
HCT VFR BLD CALC: 35.8 % — SIGNIFICANT CHANGE UP (ref 34.5–45)
HGB BLD-MCNC: 11.6 G/DL — SIGNIFICANT CHANGE UP (ref 11.5–15.5)
IANC: 7.46 K/UL — HIGH (ref 1.8–7.4)
IMM GRANULOCYTES NFR BLD AUTO: 2.8 % — HIGH (ref 0–0.9)
LYMPHOCYTES # BLD AUTO: 1.33 K/UL — SIGNIFICANT CHANGE UP (ref 1–3.3)
LYMPHOCYTES # BLD AUTO: 12.2 % — LOW (ref 13–44)
MAGNESIUM SERPL-MCNC: 2.1 MG/DL — SIGNIFICANT CHANGE UP (ref 1.6–2.6)
MCHC RBC-ENTMCNC: 30 PG — SIGNIFICANT CHANGE UP (ref 27–34)
MCHC RBC-ENTMCNC: 32.4 GM/DL — SIGNIFICANT CHANGE UP (ref 32–36)
MCV RBC AUTO: 92.5 FL — SIGNIFICANT CHANGE UP (ref 80–100)
MONOCYTES # BLD AUTO: 1.44 K/UL — HIGH (ref 0–0.9)
MONOCYTES NFR BLD AUTO: 13.2 % — SIGNIFICANT CHANGE UP (ref 2–14)
NEUTROPHILS # BLD AUTO: 7.46 K/UL — HIGH (ref 1.8–7.4)
NEUTROPHILS NFR BLD AUTO: 68.5 % — SIGNIFICANT CHANGE UP (ref 43–77)
NRBC # BLD: 0 /100 WBCS — SIGNIFICANT CHANGE UP (ref 0–0)
NRBC # FLD: 0 K/UL — SIGNIFICANT CHANGE UP (ref 0–0)
PHOSPHATE SERPL-MCNC: 2.7 MG/DL — SIGNIFICANT CHANGE UP (ref 2.5–4.5)
PLATELET # BLD AUTO: 219 K/UL — SIGNIFICANT CHANGE UP (ref 150–400)
POTASSIUM SERPL-MCNC: 3.4 MMOL/L — LOW (ref 3.5–5.3)
POTASSIUM SERPL-SCNC: 3.4 MMOL/L — LOW (ref 3.5–5.3)
PROT SERPL-MCNC: 6 G/DL — SIGNIFICANT CHANGE UP (ref 6–8.3)
RBC # BLD: 3.87 M/UL — SIGNIFICANT CHANGE UP (ref 3.8–5.2)
RBC # FLD: 12.9 % — SIGNIFICANT CHANGE UP (ref 10.3–14.5)
SODIUM SERPL-SCNC: 137 MMOL/L — SIGNIFICANT CHANGE UP (ref 135–145)
WBC # BLD: 10.89 K/UL — HIGH (ref 3.8–10.5)
WBC # FLD AUTO: 10.89 K/UL — HIGH (ref 3.8–10.5)

## 2023-07-31 PROCEDURE — 99233 SBSQ HOSP IP/OBS HIGH 50: CPT | Mod: GC

## 2023-07-31 PROCEDURE — 99232 SBSQ HOSP IP/OBS MODERATE 35: CPT | Mod: GC

## 2023-07-31 RX ORDER — HYDROMORPHONE HYDROCHLORIDE 2 MG/ML
1 INJECTION INTRAMUSCULAR; INTRAVENOUS; SUBCUTANEOUS EVERY 4 HOURS
Refills: 0 | Status: DISCONTINUED | OUTPATIENT
Start: 2023-07-31 | End: 2023-08-02

## 2023-07-31 RX ORDER — IBUPROFEN 200 MG
400 TABLET ORAL EVERY 6 HOURS
Refills: 0 | Status: COMPLETED | OUTPATIENT
Start: 2023-07-31 | End: 2023-08-02

## 2023-07-31 RX ORDER — LIDOCAINE 4 G/100G
1 CREAM TOPICAL EVERY 24 HOURS
Refills: 0 | Status: DISCONTINUED | OUTPATIENT
Start: 2023-07-31 | End: 2023-08-04

## 2023-07-31 RX ORDER — HYDROMORPHONE HYDROCHLORIDE 2 MG/ML
0.5 INJECTION INTRAMUSCULAR; INTRAVENOUS; SUBCUTANEOUS EVERY 4 HOURS
Refills: 0 | Status: DISCONTINUED | OUTPATIENT
Start: 2023-07-31 | End: 2023-08-02

## 2023-07-31 RX ORDER — CYCLOBENZAPRINE HYDROCHLORIDE 10 MG/1
5 TABLET, FILM COATED ORAL THREE TIMES A DAY
Refills: 0 | Status: DISCONTINUED | OUTPATIENT
Start: 2023-07-31 | End: 2023-08-01

## 2023-07-31 RX ORDER — ACETAMINOPHEN 500 MG
1000 TABLET ORAL EVERY 6 HOURS
Refills: 0 | Status: DISCONTINUED | OUTPATIENT
Start: 2023-07-31 | End: 2023-07-31

## 2023-07-31 RX ORDER — OXYCODONE HYDROCHLORIDE 5 MG/1
10 TABLET ORAL EVERY 6 HOURS
Refills: 0 | Status: DISCONTINUED | OUTPATIENT
Start: 2023-07-31 | End: 2023-07-31

## 2023-07-31 RX ORDER — IBUPROFEN 200 MG
400 TABLET ORAL EVERY 6 HOURS
Refills: 0 | Status: DISCONTINUED | OUTPATIENT
Start: 2023-07-31 | End: 2023-07-31

## 2023-07-31 RX ORDER — ACETAMINOPHEN 500 MG
1000 TABLET ORAL EVERY 8 HOURS
Refills: 0 | Status: COMPLETED | OUTPATIENT
Start: 2023-07-31 | End: 2023-08-02

## 2023-07-31 RX ORDER — PANTOPRAZOLE SODIUM 20 MG/1
40 TABLET, DELAYED RELEASE ORAL
Refills: 0 | Status: DISCONTINUED | OUTPATIENT
Start: 2023-07-31 | End: 2023-08-04

## 2023-07-31 RX ADMIN — Medication 400 MILLIGRAM(S): at 19:53

## 2023-07-31 RX ADMIN — LIDOCAINE 1 PATCH: 4 CREAM TOPICAL at 22:34

## 2023-07-31 RX ADMIN — Medication 1000 MILLIGRAM(S): at 15:00

## 2023-07-31 RX ADMIN — Medication 1000 MILLIGRAM(S): at 23:00

## 2023-07-31 RX ADMIN — Medication 400 MILLIGRAM(S): at 12:25

## 2023-07-31 RX ADMIN — CYCLOBENZAPRINE HYDROCHLORIDE 5 MILLIGRAM(S): 10 TABLET, FILM COATED ORAL at 22:34

## 2023-07-31 RX ADMIN — Medication 1000 MILLIGRAM(S): at 22:32

## 2023-07-31 RX ADMIN — AMLODIPINE BESYLATE 5 MILLIGRAM(S): 2.5 TABLET ORAL at 05:48

## 2023-07-31 RX ADMIN — SIMETHICONE 80 MILLIGRAM(S): 80 TABLET, CHEWABLE ORAL at 19:54

## 2023-07-31 RX ADMIN — Medication 15 MILLIGRAM(S): at 05:48

## 2023-07-31 RX ADMIN — HYDROMORPHONE HYDROCHLORIDE 1 MILLIGRAM(S): 2 INJECTION INTRAMUSCULAR; INTRAVENOUS; SUBCUTANEOUS at 11:52

## 2023-07-31 RX ADMIN — PIPERACILLIN AND TAZOBACTAM 25 GRAM(S): 4; .5 INJECTION, POWDER, LYOPHILIZED, FOR SOLUTION INTRAVENOUS at 05:48

## 2023-07-31 RX ADMIN — SIMETHICONE 80 MILLIGRAM(S): 80 TABLET, CHEWABLE ORAL at 13:55

## 2023-07-31 RX ADMIN — HYDROMORPHONE HYDROCHLORIDE 1 MILLIGRAM(S): 2 INJECTION INTRAMUSCULAR; INTRAVENOUS; SUBCUTANEOUS at 12:25

## 2023-07-31 RX ADMIN — HYDROMORPHONE HYDROCHLORIDE 2 MILLIGRAM(S): 2 INJECTION INTRAMUSCULAR; INTRAVENOUS; SUBCUTANEOUS at 01:59

## 2023-07-31 RX ADMIN — Medication 15 MILLIGRAM(S): at 06:18

## 2023-07-31 RX ADMIN — FAMOTIDINE 20 MILLIGRAM(S): 10 INJECTION INTRAVENOUS at 22:32

## 2023-07-31 RX ADMIN — HYDROMORPHONE HYDROCHLORIDE 2 MILLIGRAM(S): 2 INJECTION INTRAMUSCULAR; INTRAVENOUS; SUBCUTANEOUS at 02:29

## 2023-07-31 RX ADMIN — Medication 3 MILLILITER(S): at 04:25

## 2023-07-31 RX ADMIN — HYDROMORPHONE HYDROCHLORIDE 2 MILLIGRAM(S): 2 INJECTION INTRAMUSCULAR; INTRAVENOUS; SUBCUTANEOUS at 05:47

## 2023-07-31 RX ADMIN — HYDROMORPHONE HYDROCHLORIDE 2 MILLIGRAM(S): 2 INJECTION INTRAMUSCULAR; INTRAVENOUS; SUBCUTANEOUS at 06:17

## 2023-07-31 RX ADMIN — CYCLOBENZAPRINE HYDROCHLORIDE 5 MILLIGRAM(S): 10 TABLET, FILM COATED ORAL at 14:07

## 2023-07-31 RX ADMIN — Medication 400 MILLIGRAM(S): at 11:58

## 2023-07-31 RX ADMIN — Medication 1000 MILLIGRAM(S): at 14:07

## 2023-07-31 NOTE — PROGRESS NOTE ADULT - SUBJECTIVE AND OBJECTIVE BOX
Interval Events:   No acute events overnight.  Patient states she "finally feels like there is some improvement."  Endorses her epigastric pain is much better today.    ROS:   12 point review of systems performed and negative except otherwise noted in HPI.    Hospital Medications:  acetaminophen     Tablet .. 1000 milliGRAM(s) Oral every 6 hours  aluminum hydroxide/magnesium hydroxide/simethicone Suspension 30 milliLiter(s) Oral every 4 hours PRN  amLODIPine   Tablet 5 milliGRAM(s) Oral daily  bisacodyl Suppository 10 milliGRAM(s) Rectal daily PRN  famotidine    Tablet 20 milliGRAM(s) Oral at bedtime  HYDROmorphone  Injectable 1 milliGRAM(s) IV Push every 4 hours PRN  ibuprofen  Tablet. 400 milliGRAM(s) Oral every 6 hours  melatonin 3 milliGRAM(s) Oral at bedtime PRN  ondansetron Injectable 4 milliGRAM(s) IV Push every 8 hours PRN  oxyCODONE    IR 10 milliGRAM(s) Oral every 6 hours  polyethylene glycol 3350 17 Gram(s) Oral daily  senna 1 Tablet(s) Oral at bedtime  simethicone 80 milliGRAM(s) Chew every 6 hours PRN  sorbitol 70%/mineral oil/magnesium hydroxide/glycerin Enema 120 milliLiter(s) Rectal daily PRN      PHYSICAL EXAM:   Vital Signs:  Vital Signs Last 24 Hrs  T(C): 36.3 (31 Jul 2023 09:29), Max: 37.2 (30 Jul 2023 17:58)  T(F): 97.4 (31 Jul 2023 09:29), Max: 98.9 (30 Jul 2023 17:58)  HR: 119 (31 Jul 2023 09:29) (111 - 123)  BP: 123/70 (31 Jul 2023 09:29) (112/70 - 149/82)  BP(mean): --  RR: 18 (31 Jul 2023 09:29) (17 - 18)  SpO2: 99% (31 Jul 2023 09:29) (94% - 99%)    Parameters below as of 31 Jul 2023 09:29  Patient On (Oxygen Delivery Method): room air      Daily     Daily     GENERAL: no acute distress  NEURO: alert  HEENT: NCAT, no conjunctival pallor appreciated  CHEST: no respiratory distress, no accessory muscle use  CARDIAC: regular rate, +S1/S2  ABDOMEN: soft, mild epigastric tenderness but improved, no rebound or guarding  EXTREMITIES: warm, well perfused  SKIN: no lesions noted    LABS: reviewed                        11.6   10.89 )-----------( 219      ( 31 Jul 2023 05:43 )             35.8     07-31    137  |  97<L>  |  6<L>  ----------------------------<  103<H>  3.4<L>   |  24  |  0.64    Ca    8.3<L>      31 Jul 2023 05:43  Phos  2.7     07-31  Mg     2.10     07-31    TPro  6.0  /  Alb  3.0<L>  /  TBili  0.7  /  DBili  x   /  AST  31  /  ALT  31  /  AlkPhos  83  07-31    LIVER FUNCTIONS - ( 31 Jul 2023 05:43 )  Alb: 3.0 g/dL / Pro: 6.0 g/dL / ALK PHOS: 83 U/L / ALT: 31 U/L / AST: 31 U/L / GGT: x             Interval Diagnostic Studies: see sunrise for full report

## 2023-07-31 NOTE — PROGRESS NOTE ADULT - ASSESSMENT
ASSESSMENT/RECOMMENDATIONS: Patient is a 58 year old F w/ PMHx of GERD, HTN, s/p cholecystectomy in 2013 who presented to the admitted for management of acute interstitial pancreatitis s/p ERCP, found to be necrotizing on repeat CT 07/29. Patient tolerating diet, pain improving    - No acute surgical intervention indicated   - Continue medical management of pancreatitis   - Multimodal pain control: consider basal tylenol + toradol. prn opiates   - Okay to advance diet as tolerated   - Will continue to monitor closely       B Team Surgery   [id number]

## 2023-07-31 NOTE — PROGRESS NOTE ADULT - PROBLEM SELECTOR PLAN 4
- XR abdomen with no sign of obstruction  - Enema available as per patient preference   - Standing Miralax and Senna  - PRN Simethicone, Maalox, Dulcolax suppository  - PRN enema DVT ppx: SCDs, promote movement  GI ppx: Pantoprazole 40 mg daily, Pepcid 20 mg at bedtime .   Diet: Regular diet

## 2023-07-31 NOTE — PROGRESS NOTE ADULT - SUBJECTIVE AND OBJECTIVE BOX
Progress Note    07-25-23 (6d)    Patient is a 58y old  Female who presents with a chief complaint of Severe abdominal pain s/p ERCP (30 Jul 2023 13:59)      Subjective / Overnight Events :  - No acute events overnight.  - Pt seen and examined at bedside.     Additional ROS (if any):    MEDICATIONS  (STANDING):  acetaminophen     Tablet .. 1000 milliGRAM(s) Oral every 6 hours  amLODIPine   Tablet 5 milliGRAM(s) Oral daily  famotidine    Tablet 20 milliGRAM(s) Oral at bedtime  ibuprofen  Tablet. 400 milliGRAM(s) Oral every 6 hours  oxyCODONE    IR 10 milliGRAM(s) Oral every 6 hours  polyethylene glycol 3350 17 Gram(s) Oral daily  senna 1 Tablet(s) Oral at bedtime    MEDICATIONS  (PRN):  aluminum hydroxide/magnesium hydroxide/simethicone Suspension 30 milliLiter(s) Oral every 4 hours PRN Heartburn  bisacodyl Suppository 10 milliGRAM(s) Rectal daily PRN Constipation  HYDROmorphone  Injectable 1 milliGRAM(s) IV Push every 4 hours PRN Severe Pain (7 - 10)  melatonin 3 milliGRAM(s) Oral at bedtime PRN Insomnia  ondansetron Injectable 4 milliGRAM(s) IV Push every 8 hours PRN Nausea and/or Vomiting  simethicone 80 milliGRAM(s) Chew every 6 hours PRN Gas  sorbitol 70%/mineral oil/magnesium hydroxide/glycerin Enema 120 milliLiter(s) Rectal daily PRN Constipation      CAPILLARY BLOOD GLUCOSE        I&O's Summary    30 Jul 2023 07:01  -  31 Jul 2023 07:00  --------------------------------------------------------  IN: 940 mL / OUT: 3000 mL / NET: -2060 mL        PHYSICAL EXAM:  Vital Signs Last 24 Hrs  T(C): 36.3 (31 Jul 2023 09:29), Max: 37.2 (30 Jul 2023 17:58)  T(F): 97.4 (31 Jul 2023 09:29), Max: 98.9 (30 Jul 2023 17:58)  HR: 119 (31 Jul 2023 09:29) (111 - 123)  BP: 123/70 (31 Jul 2023 09:29) (112/70 - 149/82)  BP(mean): --  RR: 18 (31 Jul 2023 09:29) (17 - 18)  SpO2: 99% (31 Jul 2023 09:29) (94% - 99%)    Parameters below as of 31 Jul 2023 09:29  Patient On (Oxygen Delivery Method): room air        General: NAD, non-toxic appearing   HEENT: PERRLA, EOMi, no scleral icterus  CV: RRR, normal S1 and S2, no m/r/g  Lungs: normal respiratory effort. CTAB, no wheezes, rales, or rhonchi  Abd: soft, nontender, nondistended  Ext: no edema, 2+ peripheral pulses   Pysch: AAOx3, appropriate affect   Neuro: grossly non-focal, moving all extremities spontaneously   Skin: no rashes or lesions     LABS:                          11.6   10.89 )-----------( 219      ( 31 Jul 2023 05:43 )             35.8       WBC Trend: 10.89<--, 9.95<--, 11.09<--  Hb Trend: 11.6<--, 11.3<--, 11.7<--, 11.9<--, 15.1<--    07-31    137  |  97<L>  |  6<L>  ----------------------------<  103<H>  3.4<L>   |  24  |  0.64    Ca    8.3<L>      31 Jul 2023 05:43  Phos  2.7     07-31  Mg     2.10     07-31    TPro  6.0  /  Alb  3.0<L>  /  TBili  0.7  /  DBili  x   /  AST  31  /  ALT  31  /  AlkPhos  83  07-31          Urinalysis Basic - ( 31 Jul 2023 05:43 )    Color: x / Appearance: x / SG: x / pH: x  Gluc: 103 mg/dL / Ketone: x  / Bili: x / Urobili: x   Blood: x / Protein: x / Nitrite: x   Leuk Esterase: x / RBC: x / WBC x   Sq Epi: x / Non Sq Epi: x / Bacteria: x            RADIOLOGY & ADDITIONAL TESTS: Reviewed Progress Note    07-25-23 (6d)    Patient is a 58y old  Female who presents with a chief complaint of Severe abdominal pain s/p ERCP (30 Jul 2023 13:59)      Subjective / Overnight Events :  - No acute events overnight. Improved. No nausea. 3 BM yesterday.   - Pt seen and examined at bedside.     Additional ROS (if any):    MEDICATIONS  (STANDING):  acetaminophen     Tablet .. 1000 milliGRAM(s) Oral every 6 hours  amLODIPine   Tablet 5 milliGRAM(s) Oral daily  famotidine    Tablet 20 milliGRAM(s) Oral at bedtime  ibuprofen  Tablet. 400 milliGRAM(s) Oral every 6 hours  oxyCODONE    IR 10 milliGRAM(s) Oral every 6 hours  polyethylene glycol 3350 17 Gram(s) Oral daily  senna 1 Tablet(s) Oral at bedtime    MEDICATIONS  (PRN):  aluminum hydroxide/magnesium hydroxide/simethicone Suspension 30 milliLiter(s) Oral every 4 hours PRN Heartburn  bisacodyl Suppository 10 milliGRAM(s) Rectal daily PRN Constipation  HYDROmorphone  Injectable 1 milliGRAM(s) IV Push every 4 hours PRN Severe Pain (7 - 10)  melatonin 3 milliGRAM(s) Oral at bedtime PRN Insomnia  ondansetron Injectable 4 milliGRAM(s) IV Push every 8 hours PRN Nausea and/or Vomiting  simethicone 80 milliGRAM(s) Chew every 6 hours PRN Gas  sorbitol 70%/mineral oil/magnesium hydroxide/glycerin Enema 120 milliLiter(s) Rectal daily PRN Constipation      CAPILLARY BLOOD GLUCOSE        I&O's Summary    30 Jul 2023 07:01  -  31 Jul 2023 07:00  --------------------------------------------------------  IN: 940 mL / OUT: 3000 mL / NET: -2060 mL        PHYSICAL EXAM:  Vital Signs Last 24 Hrs  T(C): 36.3 (31 Jul 2023 09:29), Max: 37.2 (30 Jul 2023 17:58)  T(F): 97.4 (31 Jul 2023 09:29), Max: 98.9 (30 Jul 2023 17:58)  HR: 119 (31 Jul 2023 09:29) (111 - 123)  BP: 123/70 (31 Jul 2023 09:29) (112/70 - 149/82)  BP(mean): --  RR: 18 (31 Jul 2023 09:29) (17 - 18)  SpO2: 99% (31 Jul 2023 09:29) (94% - 99%)    Parameters below as of 31 Jul 2023 09:29  Patient On (Oxygen Delivery Method): room air    GENERAL: Alert. No acute distress.   EYES: EOMI grossly normal. Anicteric.   HENT: Moist mucous membranes.   RESP: No conversation dyspnea, no resp distress, CTAB   CARDIOVASCULAR: RRR, no m/g/r  ABDOMEN: soft but mildly firm with deep palpation. Not rigid. Generalized TTP  MSK: ROM grossly normal in all 4 extremities. No deformities  SKIN: warm and dry  NEUROLOGIC: Alert and oriented x3  PSYCHIATRIC: Cooperative. Appropriate mood and affect      LABS:                          11.6   10.89 )-----------( 219      ( 31 Jul 2023 05:43 )             35.8       WBC Trend: 10.89<--, 9.95<--, 11.09<--  Hb Trend: 11.6<--, 11.3<--, 11.7<--, 11.9<--, 15.1<--    07-31    137  |  97<L>  |  6<L>  ----------------------------<  103<H>  3.4<L>   |  24  |  0.64    Ca    8.3<L>      31 Jul 2023 05:43  Phos  2.7     07-31  Mg     2.10     07-31    TPro  6.0  /  Alb  3.0<L>  /  TBili  0.7  /  DBili  x   /  AST  31  /  ALT  31  /  AlkPhos  83  07-31          Urinalysis Basic - ( 31 Jul 2023 05:43 )    Color: x / Appearance: x / SG: x / pH: x  Gluc: 103 mg/dL / Ketone: x  / Bili: x / Urobili: x   Blood: x / Protein: x / Nitrite: x   Leuk Esterase: x / RBC: x / WBC x   Sq Epi: x / Non Sq Epi: x / Bacteria: x            RADIOLOGY & ADDITIONAL TESTS: Reviewed

## 2023-07-31 NOTE — PROGRESS NOTE ADULT - SUBJECTIVE AND OBJECTIVE BOX
SURGERY PROGRESS NOTE    SUBJECTIVE / 24H EVENTS:  Patient seen and examined on morning rounds. No acute events overnight. Patient reports feeling better this morning. Tolerated part of a tuna sandwich last night without pain, nausea, and vomiting. Passing flatus, had 3 BM yesterday      OBJECTIVE:  VITAL SIGNS:  T(C): 37.1 (07-31-23 @ 11:52), Max: 37.2 (07-30-23 @ 17:58)  HR: 104 (07-31-23 @ 12:25) (104 - 128)  BP: 123/76 (07-31-23 @ 12:25) (112/70 - 149/82)  RR: 22 (07-31-23 @ 11:52) (18 - 22)  SpO2: 96% (07-31-23 @ 11:52) (94% - 99%)  Daily     Daily       PHYSICAL EXAM:  Gen: NAD  LS: Respirations unlabored on RA   GI: Soft. Distended. Nontender to palpation       07-30-23 @ 07:01  -  07-31-23 @ 07:00  --------------------------------------------------------  IN:    Oral Fluid: 940 mL  Total IN: 940 mL    OUT:    Voided (mL): 3000 mL  Total OUT: 3000 mL    Total NET: -2060 mL          LAB VALUES:  07-31    137  |  97<L>  |  6<L>  ----------------------------<  103<H>  3.4<L>   |  24  |  0.64    Ca    8.3<L>      31 Jul 2023 05:43  Phos  2.7     07-31  Mg     2.10     07-31    TPro  6.0  /  Alb  3.0<L>  /  TBili  0.7  /  DBili  x   /  AST  31  /  ALT  31  /  AlkPhos  83  07-31                               11.6   10.89 )-----------( 219      ( 31 Jul 2023 05:43 )             35.8     LIVER FUNCTIONS - ( 31 Jul 2023 05:43 )  Alb: 3.0 g/dL / Pro: 6.0 g/dL / ALK PHOS: 83 U/L / ALT: 31 U/L / AST: 31 U/L / GGT: x                   Urinalysis Basic - ( 31 Jul 2023 05:43 )    Color: x / Appearance: x / SG: x / pH: x  Gluc: 103 mg/dL / Ketone: x  / Bili: x / Urobili: x   Blood: x / Protein: x / Nitrite: x   Leuk Esterase: x / RBC: x / WBC x   Sq Epi: x / Non Sq Epi: x / Bacteria: x      MEDICATIONS  (STANDING):  acetaminophen     Tablet .. 1000 milliGRAM(s) Oral every 8 hours  amLODIPine   Tablet 5 milliGRAM(s) Oral daily  cyclobenzaprine 5 milliGRAM(s) Oral three times a day  famotidine    Tablet 20 milliGRAM(s) Oral at bedtime  ibuprofen  Tablet. 400 milliGRAM(s) Oral every 6 hours  lidocaine   4% Patch 1 Patch Transdermal every 24 hours  pantoprazole    Tablet 40 milliGRAM(s) Oral before breakfast  polyethylene glycol 3350 17 Gram(s) Oral daily  senna 1 Tablet(s) Oral at bedtime    MEDICATIONS  (PRN):  aluminum hydroxide/magnesium hydroxide/simethicone Suspension 30 milliLiter(s) Oral every 4 hours PRN Heartburn  bisacodyl Suppository 10 milliGRAM(s) Rectal daily PRN Constipation  HYDROmorphone  Injectable 1 milliGRAM(s) IV Push every 4 hours PRN Severe Pain (7 - 10)  HYDROmorphone  Injectable 0.5 milliGRAM(s) IV Push every 4 hours PRN Moderate Pain (4 - 6)  melatonin 3 milliGRAM(s) Oral at bedtime PRN Insomnia  ondansetron Injectable 4 milliGRAM(s) IV Push every 8 hours PRN Nausea and/or Vomiting  simethicone 80 milliGRAM(s) Chew every 6 hours PRN Gas

## 2023-07-31 NOTE — PROGRESS NOTE ADULT - PROBLEM SELECTOR PROBLEM 4
Clinical Indication: Pain, lower GI bleed

 

Technique:   No oral contrast utilized, per emergency room physician request  IV

administration nonionic contrast. Venous phase spiral acquisition obtained through

the abdomen and pelvis. Multiplanar reconstructions were generated. Total dose length

product 196 mGycm. CTDIvol(s) 3 mGy. Dose reduction achieved using automated exposure

control

 

 

Comparison: none

 

Findings: No evidence of diverticulosis or diverticulitis. The appendix is only

questionably visualized, but no findings to suggest acute appendicitis are evident.

No free or loculated peritoneal gas or fluid is evident. Distal esophagus, stomach,

duodenum are unremarkable.

 

The liver, gallbladder, bile ducts, pancreas, spleen, adrenals, kidneys are

unremarkable. No retroperitoneal or mesenteric mass or adenopathy. No pelvic mass or

adenopathy. No renal or ureteral calculi, hydronephrosis, nor hydroureter. The

included lung bases are clear. The bones are unremarkable.

 

Impression: Negative

 

 

 

 

 

 

 

 

 

The CT scanner at [facility] is accredited by the American College of

Radiology and the scans are performed using protocols designed to limit radiation

exposure to as low as reasonably achievable to attain images of sufficient resolution

adequate for diagnostic evaluation. Constipation Prophylactic measure

## 2023-07-31 NOTE — PROGRESS NOTE ADULT - PROBLEM SELECTOR PLAN 6
DVT ppx: SCDs, promote movement  GI ppx: Pantoprazole 40 mg daily, Pepcid 20 mg at bedtime .   Diet: Regular diet

## 2023-07-31 NOTE — PROGRESS NOTE ADULT - ASSESSMENT
58 year old female with history of HTN, GERD, and elevated liver chemistries who presents with abdominal pain after ERCP.    # Post-ERCP pancreatitis  -EGD/EUS/ERCP on 7/24/2023 that revealed 2cm hiatal hernia, copious CBD sludge, and CBD dilatation to ampulla, stenotic papilla, 5F x 6cm single pigtail PD stent, biliary sphincterotomy, and sludge/small fragments removed using balloon catheter  -upon admission, severe/sharp epigastric pain, lipase >3000, liver chemistries normal, CT A/P with severe acute interstitial edematous pancreatitis with extensive peripancreatic edema/fluid  -CT A/P 7/29/2023 with small hypoenhancement in pancreas c/w necrotizing pancreatitis, no pancreatic collections, mild to moderate ascites, and small bilateral pleural effusions    Recommendations:  -trend clinical symptoms, exam findings, vital signs, CBC, CMP, INR  -discontinued IVF given edema, wheezing, and pulmonary infiltrates on CXR  -would not recommend antibiotics for small hypoenhancement of pancreas read as necrotizing pancreatitis  -pain control and antiemetics as QTc allows, which patient is clinically improving on - hopeful for transition to oral pain regimen tomorrow    Note incomplete until finalized by attending signature/attestation.    Toan Murillo  GI/Hepatology Fellow    MONDAY-FRIDAY 8AM-5PM:  Pager# [phone] ([facility]) or [phone] ([facility])    NON-URGENT CONSULTS:  Please email [email or web address]  AT NIGHT AND ON WEEKENDS:  Contact on-call GI fellow via answering service ([phone]) from 5pm-8am and on weekends/holidays

## 2023-07-31 NOTE — PROGRESS NOTE ADULT - PROBLEM SELECTOR PLAN 1
- symptoms began 1 day s/p ERCP (07/24), likely induced to recent procedure  - Given no resolution of symptoms s/p 3 day admissions with 21% bands on labs.   - repeat CT abdomen/pelvis w IV contrast on 07/29 shows necrosis, general surgery consulted, no surgical intervention at this time.   - Zosyn empirically.  - D/c fluids due to hypervolemia on exam (07/29)  - Blood and urine cultures no growth to date.   - Trend CBC, BMP, coags.   - Zofran 4 mg q 8 hours PRN for nausea   - Prochlorperazine 5 mg IV q4 hours PRN for nausea  - Pain management:   Standing Toradol 15 mg   Mild pain: Tylenol 650 mg PO PRN q 6 hours    Moderate pain: Dilaudid 1 mg IV PRN q 3 hours    Severe pain: Dilaudid 2 mg IV PRN q 3 hours   - Incentive spirometry  - Encouraged PO intake   - advance diet as tolerated  - appreciate GI and general surgery recs. surgery and gI consulted. Do not agree with necrotizing pancreatitis. Both also recs no Abx.   Fluid overload improved. no wheezing, no bilateral pedal edema on exam today    Pain regimen  - tylenol 1000ml, mortin 400 mg, standing q6 for 2 days  - dilaudid 0.5 mg IV q4 PRN for moderate pain  - dilaudid 1 mg IV q4 PRN for severe pain.     - Encouraged PO intake

## 2023-07-31 NOTE — PROGRESS NOTE ADULT - ASSESSMENT
58 year old female with history of HTN, GERD, s/p cholecystectomy in 2013 presents with post ERCP pancreatitis, hospital course complicated by opiate induced constipation. Noted to be tachycardic (130s HR) and hypoxic (89-92% on RA) on 07 likely due to splinting and pain. CTAP findings of necrosis on 07/29, however, no surgical intervention indicated at this time.

## 2023-07-31 NOTE — PROGRESS NOTE ADULT - PROBLEM SELECTOR PLAN 2
- Noted to have HRs up to 130   - EKG shows ST @ 126 BPM, normal axis, no acute ST-T changes, QTc within normal limits on (07/26)  - Likely due to pain  - HR of 118 this morning, after walking in room (07/28) - XR abdomen with no sign of obstruction. improved  - Enema available as per patient preference   - Standing Miralax and Senna  - PRN Simethicone, Maalox, Dulcolax suppository  - PRN enema

## 2023-08-01 LAB
ALBUMIN SERPL ELPH-MCNC: 2.5 G/DL — LOW (ref 3.3–5)
ALP SERPL-CCNC: 76 U/L — SIGNIFICANT CHANGE UP (ref 40–120)
ALT FLD-CCNC: 23 U/L — SIGNIFICANT CHANGE UP (ref 4–33)
ANION GAP SERPL CALC-SCNC: 12 MMOL/L — SIGNIFICANT CHANGE UP (ref 7–14)
APTT BLD: 25.8 SEC — SIGNIFICANT CHANGE UP (ref 24.5–35.6)
AST SERPL-CCNC: 20 U/L — SIGNIFICANT CHANGE UP (ref 4–32)
BASOPHILS # BLD AUTO: 0.07 K/UL — SIGNIFICANT CHANGE UP (ref 0–0.2)
BASOPHILS NFR BLD AUTO: 0.7 % — SIGNIFICANT CHANGE UP (ref 0–2)
BILIRUB SERPL-MCNC: 0.6 MG/DL — SIGNIFICANT CHANGE UP (ref 0.2–1.2)
BUN SERPL-MCNC: 6 MG/DL — LOW (ref 7–23)
CALCIUM SERPL-MCNC: 8.2 MG/DL — LOW (ref 8.4–10.5)
CHLORIDE SERPL-SCNC: 103 MMOL/L — SIGNIFICANT CHANGE UP (ref 98–107)
CO2 SERPL-SCNC: 23 MMOL/L — SIGNIFICANT CHANGE UP (ref 22–31)
CREAT SERPL-MCNC: 0.62 MG/DL — SIGNIFICANT CHANGE UP (ref 0.5–1.3)
EGFR: 103 ML/MIN/1.73M2 — SIGNIFICANT CHANGE UP
EOSINOPHIL # BLD AUTO: 0.41 K/UL — SIGNIFICANT CHANGE UP (ref 0–0.5)
EOSINOPHIL NFR BLD AUTO: 4 % — SIGNIFICANT CHANGE UP (ref 0–6)
GLUCOSE SERPL-MCNC: 102 MG/DL — HIGH (ref 70–99)
HCT VFR BLD CALC: 31.3 % — LOW (ref 34.5–45)
HGB BLD-MCNC: 10.6 G/DL — LOW (ref 11.5–15.5)
IANC: 7.05 K/UL — SIGNIFICANT CHANGE UP (ref 1.8–7.4)
IMM GRANULOCYTES NFR BLD AUTO: 4.1 % — HIGH (ref 0–0.9)
INR BLD: 1.15 RATIO — SIGNIFICANT CHANGE UP (ref 0.85–1.18)
LYMPHOCYTES # BLD AUTO: 1.17 K/UL — SIGNIFICANT CHANGE UP (ref 1–3.3)
LYMPHOCYTES # BLD AUTO: 11.3 % — LOW (ref 13–44)
MAGNESIUM SERPL-MCNC: 2.1 MG/DL — SIGNIFICANT CHANGE UP (ref 1.6–2.6)
MCHC RBC-ENTMCNC: 30.9 PG — SIGNIFICANT CHANGE UP (ref 27–34)
MCHC RBC-ENTMCNC: 33.9 GM/DL — SIGNIFICANT CHANGE UP (ref 32–36)
MCV RBC AUTO: 91.3 FL — SIGNIFICANT CHANGE UP (ref 80–100)
MONOCYTES # BLD AUTO: 1.25 K/UL — HIGH (ref 0–0.9)
MONOCYTES NFR BLD AUTO: 12.1 % — SIGNIFICANT CHANGE UP (ref 2–14)
NEUTROPHILS # BLD AUTO: 7.05 K/UL — SIGNIFICANT CHANGE UP (ref 1.8–7.4)
NEUTROPHILS NFR BLD AUTO: 67.8 % — SIGNIFICANT CHANGE UP (ref 43–77)
NRBC # BLD: 0 /100 WBCS — SIGNIFICANT CHANGE UP (ref 0–0)
NRBC # FLD: 0 K/UL — SIGNIFICANT CHANGE UP (ref 0–0)
PHOSPHATE SERPL-MCNC: 3.6 MG/DL — SIGNIFICANT CHANGE UP (ref 2.5–4.5)
PLATELET # BLD AUTO: 244 K/UL — SIGNIFICANT CHANGE UP (ref 150–400)
POTASSIUM SERPL-MCNC: 3.3 MMOL/L — LOW (ref 3.5–5.3)
POTASSIUM SERPL-SCNC: 3.3 MMOL/L — LOW (ref 3.5–5.3)
PROT SERPL-MCNC: 5.3 G/DL — LOW (ref 6–8.3)
PROTHROM AB SERPL-ACNC: 12.9 SEC — SIGNIFICANT CHANGE UP (ref 9.5–13)
RBC # BLD: 3.43 M/UL — LOW (ref 3.8–5.2)
RBC # FLD: 13 % — SIGNIFICANT CHANGE UP (ref 10.3–14.5)
SODIUM SERPL-SCNC: 138 MMOL/L — SIGNIFICANT CHANGE UP (ref 135–145)
WBC # BLD: 10.37 K/UL — SIGNIFICANT CHANGE UP (ref 3.8–10.5)
WBC # FLD AUTO: 10.37 K/UL — SIGNIFICANT CHANGE UP (ref 3.8–10.5)

## 2023-08-01 PROCEDURE — 99232 SBSQ HOSP IP/OBS MODERATE 35: CPT | Mod: GC

## 2023-08-01 PROCEDURE — 99233 SBSQ HOSP IP/OBS HIGH 50: CPT | Mod: GC

## 2023-08-01 RX ORDER — CYCLOBENZAPRINE HYDROCHLORIDE 10 MG/1
5 TABLET, FILM COATED ORAL AT BEDTIME
Refills: 0 | Status: COMPLETED | OUTPATIENT
Start: 2023-08-01 | End: 2023-08-02

## 2023-08-01 RX ORDER — POTASSIUM CHLORIDE 20 MEQ
40 PACKET (EA) ORAL EVERY 4 HOURS
Refills: 0 | Status: COMPLETED | OUTPATIENT
Start: 2023-08-01 | End: 2023-08-01

## 2023-08-01 RX ADMIN — Medication 1000 MILLIGRAM(S): at 22:15

## 2023-08-01 RX ADMIN — Medication 400 MILLIGRAM(S): at 13:00

## 2023-08-01 RX ADMIN — Medication 400 MILLIGRAM(S): at 02:29

## 2023-08-01 RX ADMIN — LIDOCAINE 1 PATCH: 4 CREAM TOPICAL at 07:03

## 2023-08-01 RX ADMIN — Medication 400 MILLIGRAM(S): at 01:59

## 2023-08-01 RX ADMIN — FAMOTIDINE 20 MILLIGRAM(S): 10 INJECTION INTRAVENOUS at 21:15

## 2023-08-01 RX ADMIN — Medication 1000 MILLIGRAM(S): at 21:15

## 2023-08-01 RX ADMIN — CYCLOBENZAPRINE HYDROCHLORIDE 5 MILLIGRAM(S): 10 TABLET, FILM COATED ORAL at 21:14

## 2023-08-01 RX ADMIN — Medication 400 MILLIGRAM(S): at 18:36

## 2023-08-01 RX ADMIN — Medication 1000 MILLIGRAM(S): at 16:30

## 2023-08-01 RX ADMIN — SENNA PLUS 1 TABLET(S): 8.6 TABLET ORAL at 21:14

## 2023-08-01 RX ADMIN — SIMETHICONE 80 MILLIGRAM(S): 80 TABLET, CHEWABLE ORAL at 17:31

## 2023-08-01 RX ADMIN — Medication 1000 MILLIGRAM(S): at 05:44

## 2023-08-01 RX ADMIN — HYDROMORPHONE HYDROCHLORIDE 0.5 MILLIGRAM(S): 2 INJECTION INTRAMUSCULAR; INTRAVENOUS; SUBCUTANEOUS at 02:14

## 2023-08-01 RX ADMIN — HYDROMORPHONE HYDROCHLORIDE 1 MILLIGRAM(S): 2 INJECTION INTRAMUSCULAR; INTRAVENOUS; SUBCUTANEOUS at 11:45

## 2023-08-01 RX ADMIN — Medication 400 MILLIGRAM(S): at 12:32

## 2023-08-01 RX ADMIN — HYDROMORPHONE HYDROCHLORIDE 1 MILLIGRAM(S): 2 INJECTION INTRAMUSCULAR; INTRAVENOUS; SUBCUTANEOUS at 11:18

## 2023-08-01 RX ADMIN — Medication 40 MILLIEQUIVALENT(S): at 11:00

## 2023-08-01 RX ADMIN — HYDROMORPHONE HYDROCHLORIDE 0.5 MILLIGRAM(S): 2 INJECTION INTRAMUSCULAR; INTRAVENOUS; SUBCUTANEOUS at 01:59

## 2023-08-01 RX ADMIN — Medication 400 MILLIGRAM(S): at 17:31

## 2023-08-01 RX ADMIN — Medication 1000 MILLIGRAM(S): at 06:44

## 2023-08-01 RX ADMIN — Medication 40 MILLIEQUIVALENT(S): at 15:50

## 2023-08-01 RX ADMIN — POLYETHYLENE GLYCOL 3350 17 GRAM(S): 17 POWDER, FOR SOLUTION ORAL at 12:32

## 2023-08-01 RX ADMIN — PANTOPRAZOLE SODIUM 40 MILLIGRAM(S): 20 TABLET, DELAYED RELEASE ORAL at 05:49

## 2023-08-01 RX ADMIN — AMLODIPINE BESYLATE 5 MILLIGRAM(S): 2.5 TABLET ORAL at 05:44

## 2023-08-01 RX ADMIN — Medication 1000 MILLIGRAM(S): at 15:50

## 2023-08-01 NOTE — DIETITIAN INITIAL EVALUATION ADULT - PERTINENT MEDS FT
MEDICATIONS  (STANDING):  acetaminophen     Tablet .. 1000 milliGRAM(s) Oral every 8 hours  amLODIPine   Tablet 5 milliGRAM(s) Oral daily  cyclobenzaprine 5 milliGRAM(s) Oral at bedtime  famotidine    Tablet 20 milliGRAM(s) Oral at bedtime  ibuprofen  Tablet. 400 milliGRAM(s) Oral every 6 hours  lidocaine   4% Patch 1 Patch Transdermal every 24 hours  pantoprazole    Tablet 40 milliGRAM(s) Oral before breakfast  polyethylene glycol 3350 17 Gram(s) Oral daily  potassium chloride    Tablet ER 40 milliEquivalent(s) Oral every 4 hours  senna 1 Tablet(s) Oral at bedtime    MEDICATIONS  (PRN):  aluminum hydroxide/magnesium hydroxide/simethicone Suspension 30 milliLiter(s) Oral every 4 hours PRN Heartburn  bisacodyl Suppository 10 milliGRAM(s) Rectal daily PRN Constipation  HYDROmorphone  Injectable 1 milliGRAM(s) IV Push every 4 hours PRN Severe Pain (7 - 10)  HYDROmorphone  Injectable 0.5 milliGRAM(s) IV Push every 4 hours PRN Moderate Pain (4 - 6)  melatonin 3 milliGRAM(s) Oral at bedtime PRN Insomnia  ondansetron Injectable 4 milliGRAM(s) IV Push every 8 hours PRN Nausea and/or Vomiting  simethicone 80 milliGRAM(s) Chew every 6 hours PRN Gas

## 2023-08-01 NOTE — PROGRESS NOTE ADULT - PROBLEM SELECTOR PLAN 1
surgery and gI consulted. Do not agree with necrotizing pancreatitis. Both also recs no Abx.   Fluid overload improved. no wheezing, no bilateral pedal edema on exam today    Pain regimen  - tylenol 1000ml, mortin 400 mg, standing q6 for 2 days  - dilaudid 0.5 mg IV q4 PRN for moderate pain  - dilaudid 1 mg IV q4 PRN for severe pain.     - Encouraged PO intake surgery and gI consulted. Do not agree with necrotizing pancreatitis. Both also recs no Abx.   Fluid overload improved. no wheezing, no bilateral pedal edema on exam today    Pain regimen  - tylenol 1000ml, mortin 400 mg, standing for 2 days  - dilaudid 0.5 mg IV q4 PRN for moderate pain  - dilaudid 1 mg IV q4 PRN for severe pain.   Pt okay with this pain regimen for now. Does not want to increase dilaudid dosage.     - Encouraged PO intake

## 2023-08-01 NOTE — DIETITIAN INITIAL EVALUATION ADULT - REASON FOR ADMISSION
Patient arrived to room. PIV placed. Admit assessment completed. Plan of care discussed with patient. Will monitor   Acute pancreatitis without infection or necrosis

## 2023-08-01 NOTE — PROGRESS NOTE ADULT - SUBJECTIVE AND OBJECTIVE BOX
Interval Events:   No acute events overnight.  Patient endorses overall improvement in epigastric pain that brought her into the hospital, however states she has "bad gas pains" today.    ROS:   12 point review of systems performed and negative except otherwise noted in HPI.    Hospital Medications:  acetaminophen     Tablet .. 1000 milliGRAM(s) Oral every 8 hours  aluminum hydroxide/magnesium hydroxide/simethicone Suspension 30 milliLiter(s) Oral every 4 hours PRN  amLODIPine   Tablet 5 milliGRAM(s) Oral daily  bisacodyl Suppository 10 milliGRAM(s) Rectal daily PRN  cyclobenzaprine 5 milliGRAM(s) Oral three times a day  famotidine    Tablet 20 milliGRAM(s) Oral at bedtime  HYDROmorphone  Injectable 1 milliGRAM(s) IV Push every 4 hours PRN  HYDROmorphone  Injectable 0.5 milliGRAM(s) IV Push every 4 hours PRN  ibuprofen  Tablet. 400 milliGRAM(s) Oral every 6 hours  lidocaine   4% Patch 1 Patch Transdermal every 24 hours  melatonin 3 milliGRAM(s) Oral at bedtime PRN  ondansetron Injectable 4 milliGRAM(s) IV Push every 8 hours PRN  pantoprazole    Tablet 40 milliGRAM(s) Oral before breakfast  polyethylene glycol 3350 17 Gram(s) Oral daily  potassium chloride    Tablet ER 40 milliEquivalent(s) Oral every 4 hours  senna 1 Tablet(s) Oral at bedtime  simethicone 80 milliGRAM(s) Chew every 6 hours PRN      PHYSICAL EXAM:   Vital Signs:  Vital Signs Last 24 Hrs  T(C): 37 (01 Aug 2023 05:40), Max: 37.1 (31 Jul 2023 11:52)  T(F): 98.6 (01 Aug 2023 05:40), Max: 98.8 (31 Jul 2023 15:50)  HR: 91 (01 Aug 2023 05:40) (91 - 128)  BP: 129/86 (01 Aug 2023 05:40) (107/74 - 141/79)  BP(mean): --  RR: 17 (01 Aug 2023 05:40) (16 - 22)  SpO2: 97% (01 Aug 2023 05:40) (95% - 99%)    Parameters below as of 01 Aug 2023 05:40  Patient On (Oxygen Delivery Method): room air      Daily     Daily     GENERAL: no acute distress  NEURO: alert  HEENT: NCAT, no conjunctival pallor appreciated  CHEST: no respiratory distress, no accessory muscle use  CARDIAC: regular rate, +S1/S2  ABDOMEN: soft, mild epigastric tenderness, no rebound or guarding  EXTREMITIES: warm, well perfused  SKIN: no lesions noted    LABS: reviewed                        10.6   10.37 )-----------( 244      ( 01 Aug 2023 05:10 )             31.3     08-01    138  |  103  |  6<L>  ----------------------------<  102<H>  3.3<L>   |  23  |  0.62    Ca    8.2<L>      01 Aug 2023 05:10  Phos  3.6     08-01  Mg     2.10     08-01    TPro  5.3<L>  /  Alb  2.5<L>  /  TBili  0.6  /  DBili  x   /  AST  20  /  ALT  23  /  AlkPhos  76  08-01    LIVER FUNCTIONS - ( 01 Aug 2023 05:10 )  Alb: 2.5 g/dL / Pro: 5.3 g/dL / ALK PHOS: 76 U/L / ALT: 23 U/L / AST: 20 U/L / GGT: x             Interval Diagnostic Studies: see sunrise for full report

## 2023-08-01 NOTE — PROGRESS NOTE ADULT - SUBJECTIVE AND OBJECTIVE BOX
Progress Note    07-25-23 (7d)    Patient is a 58y old  Female who presents with a chief complaint of Severe abdominal pain s/p ERCP yesterday (31 Jul 2023 09:49)      Subjective / Overnight Events :  - No acute events overnight. pain improved with standing tylenol and motrin. now using dialudid 0.5 instead of 1.   - Pt seen and examined at bedside.     Additional ROS (if any):    MEDICATIONS  (STANDING):  acetaminophen     Tablet .. 1000 milliGRAM(s) Oral every 8 hours  amLODIPine   Tablet 5 milliGRAM(s) Oral daily  cyclobenzaprine 5 milliGRAM(s) Oral three times a day  famotidine    Tablet 20 milliGRAM(s) Oral at bedtime  ibuprofen  Tablet. 400 milliGRAM(s) Oral every 6 hours  lidocaine   4% Patch 1 Patch Transdermal every 24 hours  pantoprazole    Tablet 40 milliGRAM(s) Oral before breakfast  polyethylene glycol 3350 17 Gram(s) Oral daily  senna 1 Tablet(s) Oral at bedtime    MEDICATIONS  (PRN):  aluminum hydroxide/magnesium hydroxide/simethicone Suspension 30 milliLiter(s) Oral every 4 hours PRN Heartburn  bisacodyl Suppository 10 milliGRAM(s) Rectal daily PRN Constipation  HYDROmorphone  Injectable 0.5 milliGRAM(s) IV Push every 4 hours PRN Moderate Pain (4 - 6)  HYDROmorphone  Injectable 1 milliGRAM(s) IV Push every 4 hours PRN Severe Pain (7 - 10)  melatonin 3 milliGRAM(s) Oral at bedtime PRN Insomnia  ondansetron Injectable 4 milliGRAM(s) IV Push every 8 hours PRN Nausea and/or Vomiting  simethicone 80 milliGRAM(s) Chew every 6 hours PRN Gas      CAPILLARY BLOOD GLUCOSE        I&O's Summary    31 Jul 2023 07:01  -  01 Aug 2023 07:00  --------------------------------------------------------  IN: 1320 mL / OUT: 2200 mL / NET: -880 mL        PHYSICAL EXAM:  Vital Signs Last 24 Hrs  T(C): 37 (01 Aug 2023 05:40), Max: 37.1 (31 Jul 2023 11:52)  T(F): 98.6 (01 Aug 2023 05:40), Max: 98.8 (31 Jul 2023 15:50)  HR: 91 (01 Aug 2023 05:40) (91 - 128)  BP: 129/86 (01 Aug 2023 05:40) (107/74 - 141/79)  BP(mean): --  RR: 17 (01 Aug 2023 05:40) (16 - 22)  SpO2: 97% (01 Aug 2023 05:40) (95% - 99%)    Parameters below as of 01 Aug 2023 05:40  Patient On (Oxygen Delivery Method): room air    GENERAL: Alert. No acute distress.   EYES: EOMI grossly normal. Anicteric.   HENT: Moist mucous membranes.   RESP: No conversation dyspnea, no resp distress, CTAB   CARDIOVASCULAR: RRR, no m/g/r  ABDOMEN: soft, non distended, diffuse ttp  MSK: ROM grossly normal in all 4 extremities.   SKIN: warm and dry  NEUROLOGIC: Alert and oriented x3  PSYCHIATRIC: Cooperative. Appropriate mood and affect      LABS:                          10.6   10.37 )-----------( 244      ( 01 Aug 2023 05:10 )             31.3       WBC Trend: 10.37<--, 10.89<--, 9.95<--  Hb Trend: 10.6<--, 11.6<--, 11.3<--, 11.7<--, 11.9<--    08-01    138  |  103  |  6<L>  ----------------------------<  102<H>  3.3<L>   |  23  |  0.62    Ca    8.2<L>      01 Aug 2023 05:10  Phos  3.6     08-01  Mg     2.10     08-01    TPro  5.3<L>  /  Alb  2.5<L>  /  TBili  0.6  /  DBili  x   /  AST  20  /  ALT  23  /  AlkPhos  76  08-01    PT/INR - ( 01 Aug 2023 05:10 )   PT: 12.9 sec;   INR: 1.15 ratio         PTT - ( 01 Aug 2023 05:10 )  PTT:25.8 sec      Urinalysis Basic - ( 01 Aug 2023 05:10 )    Color: x / Appearance: x / SG: x / pH: x  Gluc: 102 mg/dL / Ketone: x  / Bili: x / Urobili: x   Blood: x / Protein: x / Nitrite: x   Leuk Esterase: x / RBC: x / WBC x   Sq Epi: x / Non Sq Epi: x / Bacteria: x            RADIOLOGY & ADDITIONAL TESTS: Reviewed Progress Note    07-25-23 (7d)    Patient is a 58y old  Female who presents with a chief complaint of Severe abdominal pain s/p ERCP yesterday (31 Jul 2023 09:49)      Subjective / Overnight Events :  - No acute events overnight. pain improved with standing tylenol and motrin. pain improved but persisted with dialudid 1 mg. Pain was resolved with dialudid 2 mg  - Pt seen and examined at bedside.     Additional ROS (if any):    MEDICATIONS  (STANDING):  acetaminophen     Tablet .. 1000 milliGRAM(s) Oral every 8 hours  amLODIPine   Tablet 5 milliGRAM(s) Oral daily  cyclobenzaprine 5 milliGRAM(s) Oral three times a day  famotidine    Tablet 20 milliGRAM(s) Oral at bedtime  ibuprofen  Tablet. 400 milliGRAM(s) Oral every 6 hours  lidocaine   4% Patch 1 Patch Transdermal every 24 hours  pantoprazole    Tablet 40 milliGRAM(s) Oral before breakfast  polyethylene glycol 3350 17 Gram(s) Oral daily  senna 1 Tablet(s) Oral at bedtime    MEDICATIONS  (PRN):  aluminum hydroxide/magnesium hydroxide/simethicone Suspension 30 milliLiter(s) Oral every 4 hours PRN Heartburn  bisacodyl Suppository 10 milliGRAM(s) Rectal daily PRN Constipation  HYDROmorphone  Injectable 0.5 milliGRAM(s) IV Push every 4 hours PRN Moderate Pain (4 - 6)  HYDROmorphone  Injectable 1 milliGRAM(s) IV Push every 4 hours PRN Severe Pain (7 - 10)  melatonin 3 milliGRAM(s) Oral at bedtime PRN Insomnia  ondansetron Injectable 4 milliGRAM(s) IV Push every 8 hours PRN Nausea and/or Vomiting  simethicone 80 milliGRAM(s) Chew every 6 hours PRN Gas      CAPILLARY BLOOD GLUCOSE        I&O's Summary    31 Jul 2023 07:01  -  01 Aug 2023 07:00  --------------------------------------------------------  IN: 1320 mL / OUT: 2200 mL / NET: -880 mL        PHYSICAL EXAM:  Vital Signs Last 24 Hrs  T(C): 37 (01 Aug 2023 05:40), Max: 37.1 (31 Jul 2023 11:52)  T(F): 98.6 (01 Aug 2023 05:40), Max: 98.8 (31 Jul 2023 15:50)  HR: 91 (01 Aug 2023 05:40) (91 - 128)  BP: 129/86 (01 Aug 2023 05:40) (107/74 - 141/79)  BP(mean): --  RR: 17 (01 Aug 2023 05:40) (16 - 22)  SpO2: 97% (01 Aug 2023 05:40) (95% - 99%)    Parameters below as of 01 Aug 2023 05:40  Patient On (Oxygen Delivery Method): room air    GENERAL: Alert. No acute distress.   EYES: EOMI grossly normal. Anicteric.   HENT: Moist mucous membranes.   RESP: No conversation dyspnea, no resp distress, CTAB   CARDIOVASCULAR: RRR, no m/g/r  ABDOMEN: soft, non distended, diffuse ttp  MSK: ROM grossly normal in all 4 extremities.   SKIN: warm and dry  NEUROLOGIC: Alert and oriented x3  PSYCHIATRIC: Cooperative. Appropriate mood and affect      LABS:                          10.6   10.37 )-----------( 244      ( 01 Aug 2023 05:10 )             31.3       WBC Trend: 10.37<--, 10.89<--, 9.95<--  Hb Trend: 10.6<--, 11.6<--, 11.3<--, 11.7<--, 11.9<--    08-01    138  |  103  |  6<L>  ----------------------------<  102<H>  3.3<L>   |  23  |  0.62    Ca    8.2<L>      01 Aug 2023 05:10  Phos  3.6     08-01  Mg     2.10     08-01    TPro  5.3<L>  /  Alb  2.5<L>  /  TBili  0.6  /  DBili  x   /  AST  20  /  ALT  23  /  AlkPhos  76  08-01    PT/INR - ( 01 Aug 2023 05:10 )   PT: 12.9 sec;   INR: 1.15 ratio         PTT - ( 01 Aug 2023 05:10 )  PTT:25.8 sec      Urinalysis Basic - ( 01 Aug 2023 05:10 )    Color: x / Appearance: x / SG: x / pH: x  Gluc: 102 mg/dL / Ketone: x  / Bili: x / Urobili: x   Blood: x / Protein: x / Nitrite: x   Leuk Esterase: x / RBC: x / WBC x   Sq Epi: x / Non Sq Epi: x / Bacteria: x            RADIOLOGY & ADDITIONAL TESTS: Reviewed

## 2023-08-01 NOTE — DIETITIAN INITIAL EVALUATION ADULT - ADD RECOMMEND
1) Recommend low fat diet  2) Replete electrolytes prn   3) Monitor weights, labs, BM's, skin integrity, p.o. intake.   4) Encourage PO intake and honor food preferences as able.   5) Please document % PO intake in RN flowsheets

## 2023-08-01 NOTE — DIETITIAN INITIAL EVALUATION ADULT - PERTINENT LABORATORY DATA
08-01 Na 138 mmol/L Glu 102 mg/dL<H> K+ 3.3 mmol/L<L> Cr 0.62 mg/dL BUN 6 mg/dL<L> Phos 3.6 mg/dL

## 2023-08-01 NOTE — PROGRESS NOTE ADULT - ASSESSMENT
58 year old female with history of HTN, GERD, and elevated liver chemistries who presents with abdominal pain after ERCP.    # Post-ERCP pancreatitis  -EGD/EUS/ERCP on 7/24/2023 that revealed 2cm hiatal hernia, copious CBD sludge, and CBD dilatation to ampulla, stenotic papilla, 5F x 6cm single pigtail PD stent, biliary sphincterotomy, and sludge/small fragments removed using balloon catheter  -upon admission, severe/sharp epigastric pain, lipase >3000, liver chemistries normal, CT A/P with severe acute interstitial edematous pancreatitis with extensive peripancreatic edema/fluid  -CT A/P 7/29/2023 with small hypoenhancement in pancreas c/w necrotizing pancreatitis, no pancreatic collections, mild to moderate ascites, and small bilateral pleural effusions    Recommendations:  -trend clinical symptoms, exam findings, vital signs, CBC, CMP, INR  -discontinued IVF given edema, wheezing, and pulmonary infiltrates on CXR  -would not recommend antibiotics for small hypoenhancement of pancreas read as necrotizing pancreatitis  -pain control and antiemetics as QTc allows, which patient is clinically improving on - hopeful for transition to oral pain regimen soon  -ensure adequate bowel regimen +/- simethicone as needed to minimize risk for constipation and "gas pains"    Note incomplete until finalized by attending signature/attestation.    Toan Murillo  GI/Hepatology Fellow    MONDAY-FRIDAY 8AM-5PM:  Pager# [phone] ([facility]) or [phone] ([facility])    NON-URGENT CONSULTS:  Please email [email or web address]  AT NIGHT AND ON WEEKENDS:  Contact on-call GI fellow via answering service ([phone]) from 5pm-8am and on weekends/holidays

## 2023-08-01 NOTE — PROGRESS NOTE ADULT - SUBJECTIVE AND OBJECTIVE BOX
SURGERY PROGRESS NOTE    SUBJECTIVE / 24H EVENTS:  Patient seen and examined on morning rounds. Patient reports lots of gas pain overnight but did have 3 BM, mostly diarrhea.       OBJECTIVE:  VITAL SIGNS:  T(C): 36.9 (08-01-23 @ 10:07), Max: 37.1 (07-31-23 @ 15:50)  HR: 101 (08-01-23 @ 11:14) (91 - 107)  BP: 120/79 (08-01-23 @ 11:14) (107/74 - 135/83)  RR: 18 (08-01-23 @ 11:14) (16 - 18)  SpO2: 100% (08-01-23 @ 11:14) (95% - 100%)  Daily     Daily       PHYSICAL EXAM:  Gen: NAD  LS: Respirations unlabored on RA  GI: Softly distended, nontender to palpation   Ext: Warm, well perfused      07-31-23 @ 07:01  -  08-01-23 @ 07:00  --------------------------------------------------------  IN:    Oral Fluid: 1320 mL  Total IN: 1320 mL    OUT:    Voided (mL): 2200 mL  Total OUT: 2200 mL    Total NET: -880 mL          LAB VALUES:  08-01    138  |  103  |  6<L>  ----------------------------<  102<H>  3.3<L>   |  23  |  0.62    Ca    8.2<L>      01 Aug 2023 05:10  Phos  3.6     08-01  Mg     2.10     08-01    TPro  5.3<L>  /  Alb  2.5<L>  /  TBili  0.6  /  DBili  x   /  AST  20  /  ALT  23  /  AlkPhos  76  08-01                               10.6   10.37 )-----------( 244      ( 01 Aug 2023 05:10 )             31.3     LIVER FUNCTIONS - ( 01 Aug 2023 05:10 )  Alb: 2.5 g/dL / Pro: 5.3 g/dL / ALK PHOS: 76 U/L / ALT: 23 U/L / AST: 20 U/L / GGT: x           PT/INR - ( 01 Aug 2023 05:10 )   PT: 12.9 sec;   INR: 1.15 ratio         PTT - ( 01 Aug 2023 05:10 )  PTT:25.8 sec        Urinalysis Basic - ( 01 Aug 2023 05:10 )    Color: x / Appearance: x / SG: x / pH: x  Gluc: 102 mg/dL / Ketone: x  / Bili: x / Urobili: x   Blood: x / Protein: x / Nitrite: x   Leuk Esterase: x / RBC: x / WBC x   Sq Epi: x / Non Sq Epi: x / Bacteria: x            MEDICATIONS  (STANDING):  acetaminophen     Tablet .. 1000 milliGRAM(s) Oral every 8 hours  amLODIPine   Tablet 5 milliGRAM(s) Oral daily  cyclobenzaprine 5 milliGRAM(s) Oral three times a day  famotidine    Tablet 20 milliGRAM(s) Oral at bedtime  ibuprofen  Tablet. 400 milliGRAM(s) Oral every 6 hours  lidocaine   4% Patch 1 Patch Transdermal every 24 hours  pantoprazole    Tablet 40 milliGRAM(s) Oral before breakfast  polyethylene glycol 3350 17 Gram(s) Oral daily  potassium chloride    Tablet ER 40 milliEquivalent(s) Oral every 4 hours  senna 1 Tablet(s) Oral at bedtime    MEDICATIONS  (PRN):  aluminum hydroxide/magnesium hydroxide/simethicone Suspension 30 milliLiter(s) Oral every 4 hours PRN Heartburn  bisacodyl Suppository 10 milliGRAM(s) Rectal daily PRN Constipation  HYDROmorphone  Injectable 1 milliGRAM(s) IV Push every 4 hours PRN Severe Pain (7 - 10)  HYDROmorphone  Injectable 0.5 milliGRAM(s) IV Push every 4 hours PRN Moderate Pain (4 - 6)  melatonin 3 milliGRAM(s) Oral at bedtime PRN Insomnia  ondansetron Injectable 4 milliGRAM(s) IV Push every 8 hours PRN Nausea and/or Vomiting  simethicone 80 milliGRAM(s) Chew every 6 hours PRN Gas

## 2023-08-01 NOTE — PROGRESS NOTE ADULT - ASSESSMENT
ASSESSMENT/RECOMMENDATIONS: Patient is a 58 year old F w/ PMHx of GERD, HTN, s/p cholecystectomy in 2013 who presented to the admitted for management of acute interstitial pancreatitis s/p ERCP, found to be necrotizing on repeat CT 07/29. Patient tolerating diet, pain improving    - No surgical intervention planned at this time, patient clinically improving  - Continue management of pancreatitis per Medicine  - Multimodal pain control: Basal tylenol + ibuprofen, prn opiates. Transition to PO opiates when possible   - Continue regular diet as tolerated      B Team Surgery   [id number]

## 2023-08-01 NOTE — DIETITIAN INITIAL EVALUATION ADULT - PROBLEM SELECTOR PLAN 1
- 1 day s/p ERCP, likely induced to recent procedure  - Dr. Park, GI, is following. Recs appreciated  - Follow up infectious workup; blood cultures, UA, urine culture  - Trend CBC, BMP, coags.   - Zofran 4 mg q 8 hours PRN for nausea   - Pain management:   Mild pain: Tylenol 650 mg PO PRN q 6 hours    Moderate pain: Dilaudid 1 mg IV PRN q 3 hours    Severe pain: Dilaudid 2 mg IV PRN q 3 hours   - IVF hydration; NS at 100 cc/hour standing

## 2023-08-01 NOTE — DIETITIAN INITIAL EVALUATION ADULT - OTHER INFO
Medical course: Per chart 58 year old female with history of HTN, GERD, s/p cholecystectomy in 2013 presents with post ERCP pancreatitis, hospital course complicated by opiate induced constipation, which is now resolved CTAP findings suggestive of necrosis on 07/29, however, no surgical intervention indicated at this time. GI and surgery recs no abx. dispo pending pain control on PO medication.     Nutrition interview: Pt A&Ox4, eating banana during visit. No recent episodes of nausea or vomiting. Pt admitted with constipation, provided with bowel regimen. Now having diarrhea, Pt unsure if diarrhea is due to laxatives, antibiotics or associated with eating 2/2 pancreatitis. Last BM noted on 7/31 per RN flowsheets. Simethicone prn in place for gas. Denies any chewing/swallowing difficulties. No known food allergies. Stated UBW: 159#, reports weight has been stable. Food preferences explored and noted. Intake is 25-50% per RN flowsheets and per pt. Feeding skills: tray set up. Labs notable for hypokalemia, hypophosphatemia resolved. Replete electrolyte prn.  Pancreatitis nutrition therapy handout provided and explained to Pt.

## 2023-08-01 NOTE — DIETITIAN INITIAL EVALUATION ADULT - ORAL INTAKE PTA/DIET HISTORY
Pt reports that appetite was good PTA. Reports weight gain 2/2 menopause with intentional 10# weight loss. No specific diet followed, no supplements/vitamins taken PTA.

## 2023-08-01 NOTE — PROGRESS NOTE ADULT - PROBLEM SELECTOR PLAN 3
- Continue home Amlodipine 5 mg PO daily DVT ppx: SCDs, promote movement  GI ppx: Pantoprazole 40 mg daily, Pepcid 20 mg at bedtime .   Diet: Regular diet

## 2023-08-01 NOTE — PROGRESS NOTE ADULT - ASSESSMENT
58 year old female with history of HTN, GERD, s/p cholecystectomy in 2013 presents with post ERCP pancreatitis, hospital course complicated by opiate induced constipation. Noted to be tachycardic (130s HR) and hypoxic (89-92% on RA) on 07 likely due to splinting and pain. CTAP findings of necrosis on 07/29, however, no surgical intervention indicated at this time.      58 year old female with history of HTN, GERD, s/p cholecystectomy in 2013 presents with post ERCP pancreatitis, hospital course complicated by opiate induced constipation, which is now resolved CTAP findings suggestive of necrosis on 07/29, however, no surgical intervention indicated at this time. GI and surgery recs no abx. dispo pending pain control on PO medication.

## 2023-08-01 NOTE — DIETITIAN INITIAL EVALUATION ADULT - LITERATURE/VIDEOS GIVEN
Pancreatitis nutrition therapy hand out provided and explained to Pt. Educated Pt on low fat diet if diarrhea is associated with meals.

## 2023-08-01 NOTE — PROGRESS NOTE ADULT - PROBLEM SELECTOR PLAN 2
- XR abdomen with no sign of obstruction. improved  - Enema available as per patient preference   - Standing Miralax and Senna  - PRN Simethicone, Maalox, Dulcolax suppository  - PRN enema - Continue home Amlodipine 5 mg PO daily

## 2023-08-02 DIAGNOSIS — R19.7 DIARRHEA, UNSPECIFIED: ICD-10-CM

## 2023-08-02 LAB
ALBUMIN SERPL ELPH-MCNC: 2.6 G/DL — LOW (ref 3.3–5)
ALP SERPL-CCNC: 83 U/L — SIGNIFICANT CHANGE UP (ref 40–120)
ALT FLD-CCNC: 24 U/L — SIGNIFICANT CHANGE UP (ref 4–33)
ANION GAP SERPL CALC-SCNC: 10 MMOL/L — SIGNIFICANT CHANGE UP (ref 7–14)
AST SERPL-CCNC: 20 U/L — SIGNIFICANT CHANGE UP (ref 4–32)
BASOPHILS # BLD AUTO: 0.08 K/UL — SIGNIFICANT CHANGE UP (ref 0–0.2)
BASOPHILS NFR BLD AUTO: 0.6 % — SIGNIFICANT CHANGE UP (ref 0–2)
BILIRUB SERPL-MCNC: 0.6 MG/DL — SIGNIFICANT CHANGE UP (ref 0.2–1.2)
BUN SERPL-MCNC: 8 MG/DL — SIGNIFICANT CHANGE UP (ref 7–23)
CALCIUM SERPL-MCNC: 8.5 MG/DL — SIGNIFICANT CHANGE UP (ref 8.4–10.5)
CHLORIDE SERPL-SCNC: 103 MMOL/L — SIGNIFICANT CHANGE UP (ref 98–107)
CO2 SERPL-SCNC: 23 MMOL/L — SIGNIFICANT CHANGE UP (ref 22–31)
CREAT SERPL-MCNC: 0.53 MG/DL — SIGNIFICANT CHANGE UP (ref 0.5–1.3)
EGFR: 107 ML/MIN/1.73M2 — SIGNIFICANT CHANGE UP
EOSINOPHIL # BLD AUTO: 0.26 K/UL — SIGNIFICANT CHANGE UP (ref 0–0.5)
EOSINOPHIL NFR BLD AUTO: 2 % — SIGNIFICANT CHANGE UP (ref 0–6)
GI PCR PANEL: SIGNIFICANT CHANGE UP
GLUCOSE SERPL-MCNC: 110 MG/DL — HIGH (ref 70–99)
HCT VFR BLD CALC: 34.4 % — LOW (ref 34.5–45)
HGB BLD-MCNC: 11.4 G/DL — LOW (ref 11.5–15.5)
IANC: 9.69 K/UL — HIGH (ref 1.8–7.4)
IMM GRANULOCYTES NFR BLD AUTO: 3.9 % — HIGH (ref 0–0.9)
LYMPHOCYTES # BLD AUTO: 1.25 K/UL — SIGNIFICANT CHANGE UP (ref 1–3.3)
LYMPHOCYTES # BLD AUTO: 9.6 % — LOW (ref 13–44)
MAGNESIUM SERPL-MCNC: 1.8 MG/DL — SIGNIFICANT CHANGE UP (ref 1.6–2.6)
MCHC RBC-ENTMCNC: 30.3 PG — SIGNIFICANT CHANGE UP (ref 27–34)
MCHC RBC-ENTMCNC: 33.1 GM/DL — SIGNIFICANT CHANGE UP (ref 32–36)
MCV RBC AUTO: 91.5 FL — SIGNIFICANT CHANGE UP (ref 80–100)
MONOCYTES # BLD AUTO: 1.24 K/UL — HIGH (ref 0–0.9)
MONOCYTES NFR BLD AUTO: 9.5 % — SIGNIFICANT CHANGE UP (ref 2–14)
NEUTROPHILS # BLD AUTO: 9.69 K/UL — HIGH (ref 1.8–7.4)
NEUTROPHILS NFR BLD AUTO: 74.4 % — SIGNIFICANT CHANGE UP (ref 43–77)
NRBC # BLD: 0 /100 WBCS — SIGNIFICANT CHANGE UP (ref 0–0)
NRBC # FLD: 0 K/UL — SIGNIFICANT CHANGE UP (ref 0–0)
PHOSPHATE SERPL-MCNC: 3.7 MG/DL — SIGNIFICANT CHANGE UP (ref 2.5–4.5)
PLATELET # BLD AUTO: 250 K/UL — SIGNIFICANT CHANGE UP (ref 150–400)
POTASSIUM SERPL-MCNC: 3.8 MMOL/L — SIGNIFICANT CHANGE UP (ref 3.5–5.3)
POTASSIUM SERPL-SCNC: 3.8 MMOL/L — SIGNIFICANT CHANGE UP (ref 3.5–5.3)
PROT SERPL-MCNC: 5.8 G/DL — LOW (ref 6–8.3)
RBC # BLD: 3.76 M/UL — LOW (ref 3.8–5.2)
RBC # FLD: 12.9 % — SIGNIFICANT CHANGE UP (ref 10.3–14.5)
SODIUM SERPL-SCNC: 136 MMOL/L — SIGNIFICANT CHANGE UP (ref 135–145)
WBC # BLD: 13.03 K/UL — HIGH (ref 3.8–10.5)
WBC # FLD AUTO: 13.03 K/UL — HIGH (ref 3.8–10.5)

## 2023-08-02 PROCEDURE — 99232 SBSQ HOSP IP/OBS MODERATE 35: CPT | Mod: GC

## 2023-08-02 PROCEDURE — 99233 SBSQ HOSP IP/OBS HIGH 50: CPT

## 2023-08-02 RX ORDER — HYDROMORPHONE HYDROCHLORIDE 2 MG/ML
1 INJECTION INTRAMUSCULAR; INTRAVENOUS; SUBCUTANEOUS EVERY 4 HOURS
Refills: 0 | Status: DISCONTINUED | OUTPATIENT
Start: 2023-08-02 | End: 2023-08-04

## 2023-08-02 RX ORDER — IBUPROFEN 200 MG
400 TABLET ORAL THREE TIMES A DAY
Refills: 0 | Status: COMPLETED | OUTPATIENT
Start: 2023-08-02 | End: 2023-08-04

## 2023-08-02 RX ORDER — HYDROMORPHONE HYDROCHLORIDE 2 MG/ML
2 INJECTION INTRAMUSCULAR; INTRAVENOUS; SUBCUTANEOUS EVERY 4 HOURS
Refills: 0 | Status: DISCONTINUED | OUTPATIENT
Start: 2023-08-02 | End: 2023-08-04

## 2023-08-02 RX ADMIN — Medication 400 MILLIGRAM(S): at 01:26

## 2023-08-02 RX ADMIN — Medication 1000 MILLIGRAM(S): at 10:44

## 2023-08-02 RX ADMIN — HYDROMORPHONE HYDROCHLORIDE 1 MILLIGRAM(S): 2 INJECTION INTRAMUSCULAR; INTRAVENOUS; SUBCUTANEOUS at 05:00

## 2023-08-02 RX ADMIN — Medication 400 MILLIGRAM(S): at 17:23

## 2023-08-02 RX ADMIN — AMLODIPINE BESYLATE 5 MILLIGRAM(S): 2.5 TABLET ORAL at 05:01

## 2023-08-02 RX ADMIN — FAMOTIDINE 20 MILLIGRAM(S): 10 INJECTION INTRAVENOUS at 21:29

## 2023-08-02 RX ADMIN — Medication 400 MILLIGRAM(S): at 10:36

## 2023-08-02 RX ADMIN — CYCLOBENZAPRINE HYDROCHLORIDE 5 MILLIGRAM(S): 10 TABLET, FILM COATED ORAL at 21:29

## 2023-08-02 RX ADMIN — HYDROMORPHONE HYDROCHLORIDE 1 MILLIGRAM(S): 2 INJECTION INTRAMUSCULAR; INTRAVENOUS; SUBCUTANEOUS at 05:15

## 2023-08-02 RX ADMIN — Medication 400 MILLIGRAM(S): at 09:36

## 2023-08-02 RX ADMIN — PANTOPRAZOLE SODIUM 40 MILLIGRAM(S): 20 TABLET, DELAYED RELEASE ORAL at 05:01

## 2023-08-02 RX ADMIN — Medication 400 MILLIGRAM(S): at 00:26

## 2023-08-02 RX ADMIN — Medication 1000 MILLIGRAM(S): at 09:44

## 2023-08-02 RX ADMIN — HYDROMORPHONE HYDROCHLORIDE 1 MILLIGRAM(S): 2 INJECTION INTRAMUSCULAR; INTRAVENOUS; SUBCUTANEOUS at 23:51

## 2023-08-02 RX ADMIN — Medication 400 MILLIGRAM(S): at 16:36

## 2023-08-02 NOTE — PROGRESS NOTE ADULT - PROBLEM SELECTOR PLAN 1
surgery and gI consulted. Do not agree with necrotizing pancreatitis. Both also recs no Abx.   Fluid overload improved. no wheezing, no bilateral pedal edema on exam today    Pain regimen  - tylenol 1000ml, mortin 400 mg, standing for 2 days  - dilaudid 0.5 mg IV q4 PRN for moderate pain  - dilaudid 1 mg IV q4 PRN for severe pain.   Pt okay with this pain regimen for now. Does not want to increase dilaudid dosage.     - Encouraged PO intake - Surgery and GI following. Do not agree with necrotizing pancreatitis. Both also recs no Abx.   - Fluid overload improved. Lungs clear on exam, saturating well on RA  - motrin 400mg TID with meals  - dilaudid 1 mg PO q4 PRN for moderate pain  - dilaudid 2 mg PO q4 PRN for severe pain.   - Encouraged PO intake

## 2023-08-02 NOTE — PROGRESS NOTE ADULT - PROBLEM SELECTOR PLAN 4
DVT ppx: SCDs, promote movement  GI ppx: Pantoprazole 40 mg daily, Pepcid 20 mg at bedtime.   Diet: Regular diet DVT ppx: SCDs, promote movement  GI ppx: Pantoprazole 40 mg daily, Pepcid 20 mg at bedtime.   Diet: Regular diet (Low fat)

## 2023-08-02 NOTE — PROGRESS NOTE ADULT - ASSESSMENT
58 year old female with history of HTN, GERD, and elevated liver chemistries who presents with abdominal pain after ERCP.    # Post-ERCP pancreatitis  -EGD/EUS/ERCP on 7/24/2023 that revealed 2cm hiatal hernia, copious CBD sludge, and CBD dilatation to ampulla, stenotic papilla, 5F x 6cm single pigtail PD stent, biliary sphincterotomy, and sludge/small fragments removed using balloon catheter  -upon admission, severe/sharp epigastric pain, lipase >3000, liver chemistries normal, CT A/P with severe acute interstitial edematous pancreatitis with extensive peripancreatic edema/fluid  -CT A/P 7/29/2023 with small hypoenhancement in pancreas c/w necrotizing pancreatitis, no pancreatic collections, mild to moderate ascites, and small bilateral pleural effusions    Recommendations:  -C diff and GI PCR for loose stool movements   -trend clinical symptoms, exam findings, vital signs, CBC, CMP, INR  -discontinued IVF given edema, wheezing, and pulmonary infiltrates on CXR  -would not recommend antibiotics for small hypoenhancement of pancreas read as necrotizing pancreatitis  -pain control and antiemetics as QTc allows, which patient is clinically improving on - hopeful for transition to oral pain regimen soon  -ensure adequate bowel regimen +/- simethicone as needed to minimize risk for constipation and "gas pains"    All recommendations are tentative until note is attested by attending.     Rachelle Mayorga, PGY-5  Gastroenterology/Hepatology Fellow  Available on Microsoft Teams  [phone] ([employer] Short Range Pager)  [phone] ([facility] Long Range Pager)    After 5pm, please contact the on-call GI fellow. [phone]

## 2023-08-02 NOTE — PROGRESS NOTE ADULT - PROBLEM SELECTOR PLAN 3
DVT ppx: SCDs, promote movement  GI ppx: Pantoprazole 40 mg daily, Pepcid 20 mg at bedtime .   Diet: Regular diet - Continue home Amlodipine 5 mg PO daily

## 2023-08-02 NOTE — PROGRESS NOTE ADULT - ASSESSMENT
58 year old female with history of HTN, GERD, s/p cholecystectomy in 2013 presents with post ERCP pancreatitis, hospital course complicated by opiate induced constipation, which is now resolved CTAP findings suggestive of necrosis on 07/29, however, no surgical intervention indicated at this time. GI and surgery recs no abx. dispo pending pain control on PO medication.

## 2023-08-02 NOTE — PROGRESS NOTE ADULT - SUBJECTIVE AND OBJECTIVE BOX
[facility] Department of Hospital Medicine  Rolando Kapoor MD  Available on MS Teams  Pager: [phone]    Patient is a 58y old  Female who presents with a chief complaint of Acute pancreatitis without infection or necrosis     (01 Aug 2023 15:25)    OVERNIGHT EVENTS: No acute events overnight.     SUBJECTIVE: Pt seen and examined at bedside this morning. Reporting 7-8 episodes of watery diarrhea over past 24 hours. Tolerating diet. Continues to endorse abdominal discomfort. Reports sensation of fullness/bloating. Denies any fevers. Denies any CP, SOB, or nausea/vomiting. Amenable to trial PO dilaudid.    ADDITIONAL REVIEW OF SYSTEMS: as above    MEDICATIONS  (STANDING):  amLODIPine   Tablet 5 milliGRAM(s) Oral daily  cyclobenzaprine 5 milliGRAM(s) Oral at bedtime  famotidine    Tablet 20 milliGRAM(s) Oral at bedtime  ibuprofen  Tablet. 400 milliGRAM(s) Oral three times a day  lidocaine   4% Patch 1 Patch Transdermal every 24 hours  pantoprazole    Tablet 40 milliGRAM(s) Oral before breakfast  polyethylene glycol 3350 17 Gram(s) Oral daily    MEDICATIONS  (PRN):  aluminum hydroxide/magnesium hydroxide/simethicone Suspension 30 milliLiter(s) Oral every 4 hours PRN Heartburn  bisacodyl Suppository 10 milliGRAM(s) Rectal daily PRN Constipation  HYDROmorphone   Tablet 2 milliGRAM(s) Oral every 4 hours PRN Severe Pain (7 - 10)  HYDROmorphone   Tablet 1 milliGRAM(s) Oral every 4 hours PRN Moderate Pain (4 - 6)  melatonin 3 milliGRAM(s) Oral at bedtime PRN Insomnia  ondansetron Injectable 4 milliGRAM(s) IV Push every 8 hours PRN Nausea and/or Vomiting  simethicone 80 milliGRAM(s) Chew every 6 hours PRN Gas    CAPILLARY BLOOD GLUCOSE    I&O's Summary    01 Aug 2023 07:01  -  02 Aug 2023 07:00  --------------------------------------------------------  IN: 1650 mL / OUT: 2000 mL / NET: -350 mL    PHYSICAL EXAM:  Vital Signs Last 24 Hrs  T(C): 36.9 (02 Aug 2023 09:46), Max: 36.9 (02 Aug 2023 09:46)  T(F): 98.4 (02 Aug 2023 09:46), Max: 98.4 (02 Aug 2023 09:46)  HR: 115 (02 Aug 2023 09:46) (97 - 115)  BP: 131/79 (02 Aug 2023 09:46) (130/81 - 131/83)  BP(mean): --  RR: 16 (02 Aug 2023 09:46) (16 - 18)  SpO2: 99% (02 Aug 2023 09:46) (99% - 99%)    Parameters below as of 02 Aug 2023 09:46  Patient On (Oxygen Delivery Method): room air    CONSTITUTIONAL: NAD, well-developed  HEAD: Normocephalic, atraumatic  EYES: EOMI, PERRL  ENT: no rhinorrhea noted  RESPIRATORY: No increased work of breathing, CTAB, no wheezes or crackles appreciated  CARDIOVASCULAR: RRR, S1 and S2 present, no m/r/g  ABDOMEN: soft, mildly distended, TTP over RUQ/Umbilicus  EXTREMITIES: Trace LE edema  MUSCULOSKELETAL: no joint swelling, no tenderness to palpation  NEURO: A&Ox3, moving all extremities    LABS:                        11.4   13.03 )-----------( 250      ( 02 Aug 2023 05:17 )             34.4     08-02    136  |  103  |  8   ----------------------------<  110<H>  3.8   |  23  |  0.53    Ca    8.5      02 Aug 2023 05:17  Phos  3.7     08-02  Mg     1.80     08-02    TPro  5.8<L>  /  Alb  2.6<L>  /  TBili  0.6  /  DBili  x   /  AST  20  /  ALT  24  /  AlkPhos  83  08-02    PT/INR - ( 01 Aug 2023 05:10 )   PT: 12.9 sec;   INR: 1.15 ratio      PTT - ( 01 Aug 2023 05:10 )  PTT:25.8 sec    Urinalysis Basic - ( 02 Aug 2023 05:17 )    Color: x / Appearance: x / SG: x / pH: x  Gluc: 110 mg/dL / Ketone: x  / Bili: x / Urobili: x   Blood: x / Protein: x / Nitrite: x   Leuk Esterase: x / RBC: x / WBC x   Sq Epi: x / Non Sq Epi: x / Bacteria: x    RADIOLOGY & ADDITIONAL TESTS:    Results Reviewed:   Imaging Personally Reviewed:  Electrocardiogram Personally Reviewed:    COORDINATION OF CARE:  Care Discussed with Consultants/Other Providers [Y/N]:  Prior or Outpatient Records Reviewed [Y/N]:

## 2023-08-02 NOTE — PROGRESS NOTE ADULT - PROBLEM SELECTOR PLAN 2
- Continue home Amlodipine 5 mg PO daily - Noted with diarrhea on 8/1-8/2  - patient reports 7-8 watery diarrhea episodes over past 24 hrs  - possibly in setting of recent laxative use, however unlikely  - will check GI PCR and C. Diff (in setting of recent antibiotics and hospitalization)

## 2023-08-02 NOTE — PROGRESS NOTE ADULT - SUBJECTIVE AND OBJECTIVE BOX
Gastroenterology/Hepatology Progress Note    Interval Events:   - no acute ON events   - patient reports diarrhea last night and this AM     Allergies:  No Known Allergies      Hospital Medications:  aluminum hydroxide/magnesium hydroxide/simethicone Suspension 30 milliLiter(s) Oral every 4 hours PRN  amLODIPine   Tablet 5 milliGRAM(s) Oral daily  bisacodyl Suppository 10 milliGRAM(s) Rectal daily PRN  cyclobenzaprine 5 milliGRAM(s) Oral at bedtime  famotidine    Tablet 20 milliGRAM(s) Oral at bedtime  HYDROmorphone   Tablet 2 milliGRAM(s) Oral every 4 hours PRN  HYDROmorphone   Tablet 1 milliGRAM(s) Oral every 4 hours PRN  ibuprofen  Tablet. 400 milliGRAM(s) Oral three times a day  lidocaine   4% Patch 1 Patch Transdermal every 24 hours  melatonin 3 milliGRAM(s) Oral at bedtime PRN  ondansetron Injectable 4 milliGRAM(s) IV Push every 8 hours PRN  pantoprazole    Tablet 40 milliGRAM(s) Oral before breakfast  polyethylene glycol 3350 17 Gram(s) Oral daily  simethicone 80 milliGRAM(s) Chew every 6 hours PRN      ROS: 14 point ROS negative unless otherwise state in subjective    PHYSICAL EXAM:   Vital Signs:  Vital Signs Last 24 Hrs  T(C): 36.9 (02 Aug 2023 09:46), Max: 36.9 (02 Aug 2023 09:46)  T(F): 98.4 (02 Aug 2023 09:46), Max: 98.4 (02 Aug 2023 09:46)  HR: 115 (02 Aug 2023 09:46) (97 - 115)  BP: 131/79 (02 Aug 2023 09:46) (130/81 - 131/83)  BP(mean): --  RR: 16 (02 Aug 2023 09:46) (16 - 18)  SpO2: 99% (02 Aug 2023 09:46) (99% - 99%)    Parameters below as of 02 Aug 2023 09:46  Patient On (Oxygen Delivery Method): room air      Daily     Daily Weight in k.9 (02 Aug 2023 06:27)    GENERAL:  No acute distress  HEENT:  NCAT, no scleral icterus  CHEST: no resp distress  HEART:  RRR  ABDOMEN:  Soft, non-tender, mildly distended, no masses  EXTREMITIES:  No cyanosis, clubbing, or edema  SKIN:  No rash/erythema/ecchymoses/petechiae/wounds/abscess/warm/dry  NEURO:  Alert and oriented x 3    LABS:                        11.4   13.03 )-----------( 250      ( 02 Aug 2023 05:17 )             34.4     Mean Cell Volume: 91.5 fL (23 @ 05:17)        136  |  103  |  8   ----------------------------<  110<H>  3.8   |  23  |  0.53    Ca    8.5      02 Aug 2023 05:17  Phos  3.7       Mg     1.80         TPro  5.8<L>  /  Alb  2.6<L>  /  TBili  0.6  /  DBili  x   /  AST  20  /  ALT  24  /  AlkPhos  83  08    LIVER FUNCTIONS - ( 02 Aug 2023 05:17 )  Alb: 2.6 g/dL / Pro: 5.8 g/dL / ALK PHOS: 83 U/L / ALT: 24 U/L / AST: 20 U/L / GGT: x           PT/INR - ( 01 Aug 2023 05:10 )   PT: 12.9 sec;   INR: 1.15 ratio         PTT - ( 01 Aug 2023 05:10 )  PTT:25.8 sec  Urinalysis Basic - ( 02 Aug 2023 05:17 )    Color: x / Appearance: x / SG: x / pH: x  Gluc: 110 mg/dL / Ketone: x  / Bili: x / Urobili: x   Blood: x / Protein: x / Nitrite: x   Leuk Esterase: x / RBC: x / WBC x   Sq Epi: x / Non Sq Epi: x / Bacteria: x            Imaging:

## 2023-08-03 LAB
ANION GAP SERPL CALC-SCNC: 12 MMOL/L — SIGNIFICANT CHANGE UP (ref 7–14)
BUN SERPL-MCNC: 11 MG/DL — SIGNIFICANT CHANGE UP (ref 7–23)
CALCIUM SERPL-MCNC: 8.4 MG/DL — SIGNIFICANT CHANGE UP (ref 8.4–10.5)
CHLORIDE SERPL-SCNC: 99 MMOL/L — SIGNIFICANT CHANGE UP (ref 98–107)
CO2 SERPL-SCNC: 24 MMOL/L — SIGNIFICANT CHANGE UP (ref 22–31)
CREAT SERPL-MCNC: 0.61 MG/DL — SIGNIFICANT CHANGE UP (ref 0.5–1.3)
EGFR: 104 ML/MIN/1.73M2 — SIGNIFICANT CHANGE UP
GLUCOSE SERPL-MCNC: 105 MG/DL — HIGH (ref 70–99)
HCT VFR BLD CALC: 35 % — SIGNIFICANT CHANGE UP (ref 34.5–45)
HGB BLD-MCNC: 11.7 G/DL — SIGNIFICANT CHANGE UP (ref 11.5–15.5)
MCHC RBC-ENTMCNC: 29.9 PG — SIGNIFICANT CHANGE UP (ref 27–34)
MCHC RBC-ENTMCNC: 33.4 GM/DL — SIGNIFICANT CHANGE UP (ref 32–36)
MCV RBC AUTO: 89.5 FL — SIGNIFICANT CHANGE UP (ref 80–100)
NRBC # BLD: 0 /100 WBCS — SIGNIFICANT CHANGE UP (ref 0–0)
NRBC # FLD: 0 K/UL — SIGNIFICANT CHANGE UP (ref 0–0)
PLATELET # BLD AUTO: 285 K/UL — SIGNIFICANT CHANGE UP (ref 150–400)
POTASSIUM SERPL-MCNC: 4.2 MMOL/L — SIGNIFICANT CHANGE UP (ref 3.5–5.3)
POTASSIUM SERPL-SCNC: 4.2 MMOL/L — SIGNIFICANT CHANGE UP (ref 3.5–5.3)
RBC # BLD: 3.91 M/UL — SIGNIFICANT CHANGE UP (ref 3.8–5.2)
RBC # FLD: 13 % — SIGNIFICANT CHANGE UP (ref 10.3–14.5)
SODIUM SERPL-SCNC: 135 MMOL/L — SIGNIFICANT CHANGE UP (ref 135–145)
WBC # BLD: 13.92 K/UL — HIGH (ref 3.8–10.5)
WBC # FLD AUTO: 13.92 K/UL — HIGH (ref 3.8–10.5)

## 2023-08-03 PROCEDURE — 99232 SBSQ HOSP IP/OBS MODERATE 35: CPT

## 2023-08-03 PROCEDURE — 99232 SBSQ HOSP IP/OBS MODERATE 35: CPT | Mod: GC

## 2023-08-03 RX ADMIN — FAMOTIDINE 20 MILLIGRAM(S): 10 INJECTION INTRAVENOUS at 22:03

## 2023-08-03 RX ADMIN — Medication 400 MILLIGRAM(S): at 08:54

## 2023-08-03 RX ADMIN — PANTOPRAZOLE SODIUM 40 MILLIGRAM(S): 20 TABLET, DELAYED RELEASE ORAL at 05:34

## 2023-08-03 RX ADMIN — Medication 400 MILLIGRAM(S): at 23:03

## 2023-08-03 RX ADMIN — Medication 400 MILLIGRAM(S): at 16:30

## 2023-08-03 RX ADMIN — AMLODIPINE BESYLATE 5 MILLIGRAM(S): 2.5 TABLET ORAL at 05:34

## 2023-08-03 RX ADMIN — Medication 400 MILLIGRAM(S): at 09:40

## 2023-08-03 RX ADMIN — Medication 400 MILLIGRAM(S): at 22:03

## 2023-08-03 RX ADMIN — Medication 400 MILLIGRAM(S): at 15:55

## 2023-08-03 RX ADMIN — HYDROMORPHONE HYDROCHLORIDE 1 MILLIGRAM(S): 2 INJECTION INTRAMUSCULAR; INTRAVENOUS; SUBCUTANEOUS at 00:21

## 2023-08-03 NOTE — PROGRESS NOTE ADULT - SUBJECTIVE AND OBJECTIVE BOX
Gastroenterology/Hepatology Progress Note    Interval Events:   - patient reports diarrhea overnight (4 episodes)  - patient reports that C diff was unable to be sent given recent stool softener   - GI PCR negative   - reports no abdominal pain after diet; reports diarrhea improves after PO intake   - abdomen distension much improved     Allergies:  No Known Allergies      Hospital Medications:  aluminum hydroxide/magnesium hydroxide/simethicone Suspension 30 milliLiter(s) Oral every 4 hours PRN  amLODIPine   Tablet 5 milliGRAM(s) Oral daily  bisacodyl Suppository 10 milliGRAM(s) Rectal daily PRN  famotidine    Tablet 20 milliGRAM(s) Oral at bedtime  HYDROmorphone   Tablet 2 milliGRAM(s) Oral every 4 hours PRN  HYDROmorphone   Tablet 1 milliGRAM(s) Oral every 4 hours PRN  ibuprofen  Tablet. 400 milliGRAM(s) Oral three times a day  lidocaine   4% Patch 1 Patch Transdermal every 24 hours  melatonin 3 milliGRAM(s) Oral at bedtime PRN  ondansetron Injectable 4 milliGRAM(s) IV Push every 8 hours PRN  pantoprazole    Tablet 40 milliGRAM(s) Oral before breakfast  polyethylene glycol 3350 17 Gram(s) Oral daily  simethicone 80 milliGRAM(s) Chew every 6 hours PRN      ROS: 14 point ROS negative unless otherwise state in subjective    PHYSICAL EXAM:   Vital Signs:  Vital Signs Last 24 Hrs  T(C): 37.2 (03 Aug 2023 05:37), Max: 37.3 (03 Aug 2023 01:47)  T(F): 98.9 (03 Aug 2023 05:37), Max: 99.1 (03 Aug 2023 01:47)  HR: 110 (03 Aug 2023 05:37) (105 - 115)  BP: 129/80 (03 Aug 2023 05:37) (124/85 - 131/79)  BP(mean): --  RR: 16 (03 Aug 2023 05:37) (16 - 17)  SpO2: 100% (03 Aug 2023 05:37) (98% - 100%)    Parameters below as of 03 Aug 2023 05:37  Patient On (Oxygen Delivery Method): room air      Daily     Daily     GENERAL:  No acute distress  HEENT:  NCAT, no scleral icterus  CHEST: no resp distress  HEART:  RRR  ABDOMEN:  Soft, non-tender, non-distended, no masses  EXTREMITIES:  No cyanosis, clubbing, or edema  SKIN:  No rash/erythema/ecchymoses/petechiae/wounds/abscess/warm/dry  NEURO:  Alert and oriented x 3     LABS:                        11.7   13.92 )-----------( 285      ( 03 Aug 2023 06:38 )             35.0     Mean Cell Volume: 89.5 fL (08-03-23 @ 06:38)    08-03    135  |  99  |  11  ----------------------------<  105<H>  4.2   |  24  |  0.61    Ca    8.4      03 Aug 2023 06:38  Phos  3.7     08-02  Mg     1.80     08-02    TPro  5.8<L>  /  Alb  2.6<L>  /  TBili  0.6  /  DBili  x   /  AST  20  /  ALT  24  /  AlkPhos  83  08-02    LIVER FUNCTIONS - ( 02 Aug 2023 05:17 )  Alb: 2.6 g/dL / Pro: 5.8 g/dL / ALK PHOS: 83 U/L / ALT: 24 U/L / AST: 20 U/L / GGT: x             Urinalysis Basic - ( 03 Aug 2023 06:38 )    Color: x / Appearance: x / SG: x / pH: x  Gluc: 105 mg/dL / Ketone: x  / Bili: x / Urobili: x   Blood: x / Protein: x / Nitrite: x   Leuk Esterase: x / RBC: x / WBC x   Sq Epi: x / Non Sq Epi: x / Bacteria: x            Imaging:

## 2023-08-03 NOTE — PROGRESS NOTE ADULT - SUBJECTIVE AND OBJECTIVE BOX
Medicine Progress Note    Patient is a 58y old  Female who presents with a chief complaint of Severe abdominal pain s/p ERCP  (03 Aug 2023 08:40)      SUBJECTIVE / OVERNIGHT EVENTS: had 7 episodes of diarrhea in the last 24 hours   C diff collection pending   Abdominal pain is significantly improved         MEDICATIONS  (STANDING):  amLODIPine   Tablet 5 milliGRAM(s) Oral daily  famotidine    Tablet 20 milliGRAM(s) Oral at bedtime  ibuprofen  Tablet. 400 milliGRAM(s) Oral three times a day  lidocaine   4% Patch 1 Patch Transdermal every 24 hours  pantoprazole    Tablet 40 milliGRAM(s) Oral before breakfast  polyethylene glycol 3350 17 Gram(s) Oral daily    MEDICATIONS  (PRN):  aluminum hydroxide/magnesium hydroxide/simethicone Suspension 30 milliLiter(s) Oral every 4 hours PRN Heartburn  bisacodyl Suppository 10 milliGRAM(s) Rectal daily PRN Constipation  HYDROmorphone   Tablet 2 milliGRAM(s) Oral every 4 hours PRN Severe Pain (7 - 10)  HYDROmorphone   Tablet 1 milliGRAM(s) Oral every 4 hours PRN Moderate Pain (4 - 6)  melatonin 3 milliGRAM(s) Oral at bedtime PRN Insomnia  ondansetron Injectable 4 milliGRAM(s) IV Push every 8 hours PRN Nausea and/or Vomiting  simethicone 80 milliGRAM(s) Chew every 6 hours PRN Gas    CAPILLARY BLOOD GLUCOSE        I&O's Summary    02 Aug 2023 07:01  -  03 Aug 2023 07:00  --------------------------------------------------------  IN: 600 mL / OUT: 700 mL / NET: -100 mL        PHYSICAL EXAM:  Vital Signs Last 24 Hrs  T(C): 37.1 (03 Aug 2023 09:52), Max: 37.3 (03 Aug 2023 01:47)  T(F): 98.7 (03 Aug 2023 09:52), Max: 99.1 (03 Aug 2023 01:47)  HR: 107 (03 Aug 2023 09:52) (105 - 110)  BP: 122/71 (03 Aug 2023 09:52) (122/71 - 130/82)  BP(mean): --  RR: 16 (03 Aug 2023 09:52) (16 - 17)  SpO2: 98% (03 Aug 2023 09:52) (98% - 100%)    Parameters below as of 03 Aug 2023 09:52  Patient On (Oxygen Delivery Method): room air      CONSTITUTIONAL: NAD,   ENMT: Moist oral mucosa, no pharyngeal injection or exudates;   RESPIRATORY: Normal respiratory effort; lungs are clear to auscultation bilaterally  CARDIOVASCULAR: Regular rate and rhythm, normal S1 and S2,; No lower extremity edema;   ABDOMEN: Nontender to palpation, normoactive bowel sounds, no rebound/guarding;   PSYCH: A+O to person, place, and time; affect appropriate  NEUROLOGY: CN 2-12 are intact and symmetric; no gross sensory deficits   SKIN: No rashes; no palpable lesions    LABS:                        11.7   13.92 )-----------( 285      ( 03 Aug 2023 06:38 )             35.0     08-03    135  |  99  |  11  ----------------------------<  105<H>  4.2   |  24  |  0.61    Ca    8.4      03 Aug 2023 06:38  Phos  3.7     08-02  Mg     1.80     08-02    TPro  5.8<L>  /  Alb  2.6<L>  /  TBili  0.6  /  DBili  x   /  AST  20  /  ALT  24  /  AlkPhos  83  08-02          Urinalysis Basic - ( 03 Aug 2023 06:38 )    Color: x / Appearance: x / SG: x / pH: x  Gluc: 105 mg/dL / Ketone: x  / Bili: x / Urobili: x   Blood: x / Protein: x / Nitrite: x   Leuk Esterase: x / RBC: x / WBC x   Sq Epi: x / Non Sq Epi: x / Bacteria: x            RADIOLOGY & ADDITIONAL TESTS:  Imaging from Last 24 Hours:    Electrocardiogram/QTc Interval:    COORDINATION OF CARE:  Care Discussed with Consultants/Other Providers: ACP

## 2023-08-03 NOTE — PROGRESS NOTE ADULT - PROBLEM SELECTOR PLAN 4
DVT ppx: SCDs, promote movement  GI ppx: Pantoprazole 40 mg daily, Pepcid 20 mg at bedtime.   Diet: Regular diet (Low fat)

## 2023-08-03 NOTE — PROGRESS NOTE ADULT - PROBLEM SELECTOR PLAN 1
- Surgery and GI following. Do not agree with necrotizing pancreatitis. Both also recs no Abx.   - Fluid overload improved. Lungs clear on exam, saturating well on RA  - motrin 400mg TID with meals  - dilaudid 1 mg PO q4 PRN for moderate pain  - dilaudid 2 mg PO q4 PRN for severe pain.   - Encouraged PO intake

## 2023-08-03 NOTE — PROGRESS NOTE ADULT - ASSESSMENT
58 year old female with history of HTN, GERD, and elevated liver chemistries who presents with abdominal pain after ERCP.    # Post-ERCP pancreatitis  -EGD/EUS/ERCP on 7/24/2023 that revealed 2cm hiatal hernia, copious CBD sludge, and CBD dilatation to ampulla, stenotic papilla, 5F x 6cm single pigtail PD stent, biliary sphincterotomy, and sludge/small fragments removed using balloon catheter  -upon admission, severe/sharp epigastric pain, lipase >3000, liver chemistries normal, CT A/P with severe acute interstitial edematous pancreatitis with extensive peripancreatic edema/fluid  -CT A/P 7/29/2023 with small hypoenhancement in pancreas c/w necrotizing pancreatitis, no pancreatic collections, mild to moderate ascites, and small bilateral pleural effusions    #diarrhea - likely related to run-off stool from previous constipation given patient's abdomen distension much improved with bowel movements   - GI PCR negative     Recommendations:  -trend clinical symptoms, exam findings, vital signs, CBC, CMP, INR  -discontinued IVF given edema, wheezing, and pulmonary infiltrates on CXR  -would not recommend antibiotics for small hypoenhancement of pancreas read as necrotizing pancreatitis  -pain control and antiemetics as QTc allows, which patient is clinically improving on - hopeful for transition to oral pain regimen soon  -ensure adequate bowel regimen +/- simethicone as needed to minimize risk for "gas pains"    All recommendations are tentative until note is attested by attending.     Rachelle Mayorga, PGY-5  Gastroenterology/Hepatology Fellow  Available on [employer] Teams  [phone] ([employer] Short Range Pager)  [phone] ([facility] Long Range Pager)    After 5pm, please contact the on-call GI fellow. [phone]

## 2023-08-03 NOTE — PROGRESS NOTE ADULT - PROBLEM SELECTOR PLAN 2
- Noted with diarrhea on 8/1-8/2  - patient reports 7-8 watery diarrhea episodes over past 24 hrs again   - possibly in setting of recent laxative use, however unlikely  - will check GI PCR and C. Diff (in setting of recent antibiotics and hospitalization)

## 2023-08-04 ENCOUNTER — TRANSCRIPTION ENCOUNTER (OUTPATIENT)
Age: 58
End: 2023-08-04

## 2023-08-04 VITALS
HEART RATE: 103 BPM | TEMPERATURE: 98 F | OXYGEN SATURATION: 99 % | SYSTOLIC BLOOD PRESSURE: 111 MMHG | DIASTOLIC BLOOD PRESSURE: 70 MMHG | RESPIRATION RATE: 16 BRPM

## 2023-08-04 LAB
ANION GAP SERPL CALC-SCNC: 12 MMOL/L — SIGNIFICANT CHANGE UP (ref 7–14)
BUN SERPL-MCNC: 14 MG/DL — SIGNIFICANT CHANGE UP (ref 7–23)
C DIFF BY PCR RESULT: SIGNIFICANT CHANGE UP
CALCIUM SERPL-MCNC: 8.8 MG/DL — SIGNIFICANT CHANGE UP (ref 8.4–10.5)
CHLORIDE SERPL-SCNC: 102 MMOL/L — SIGNIFICANT CHANGE UP (ref 98–107)
CO2 SERPL-SCNC: 23 MMOL/L — SIGNIFICANT CHANGE UP (ref 22–31)
CREAT SERPL-MCNC: 0.62 MG/DL — SIGNIFICANT CHANGE UP (ref 0.5–1.3)
EGFR: 103 ML/MIN/1.73M2 — SIGNIFICANT CHANGE UP
GLUCOSE SERPL-MCNC: 94 MG/DL — SIGNIFICANT CHANGE UP (ref 70–99)
HCT VFR BLD CALC: 36.8 % — SIGNIFICANT CHANGE UP (ref 34.5–45)
HGB BLD-MCNC: 12 G/DL — SIGNIFICANT CHANGE UP (ref 11.5–15.5)
MCHC RBC-ENTMCNC: 30.4 PG — SIGNIFICANT CHANGE UP (ref 27–34)
MCHC RBC-ENTMCNC: 32.6 GM/DL — SIGNIFICANT CHANGE UP (ref 32–36)
MCV RBC AUTO: 93.2 FL — SIGNIFICANT CHANGE UP (ref 80–100)
NRBC # BLD: 0 /100 WBCS — SIGNIFICANT CHANGE UP (ref 0–0)
NRBC # FLD: 0 K/UL — SIGNIFICANT CHANGE UP (ref 0–0)
PLATELET # BLD AUTO: 297 K/UL — SIGNIFICANT CHANGE UP (ref 150–400)
POTASSIUM SERPL-MCNC: 4.4 MMOL/L — SIGNIFICANT CHANGE UP (ref 3.5–5.3)
POTASSIUM SERPL-SCNC: 4.4 MMOL/L — SIGNIFICANT CHANGE UP (ref 3.5–5.3)
RBC # BLD: 3.95 M/UL — SIGNIFICANT CHANGE UP (ref 3.8–5.2)
RBC # FLD: 12.8 % — SIGNIFICANT CHANGE UP (ref 10.3–14.5)
SODIUM SERPL-SCNC: 137 MMOL/L — SIGNIFICANT CHANGE UP (ref 135–145)
WBC # BLD: 10.41 K/UL — SIGNIFICANT CHANGE UP (ref 3.8–10.5)
WBC # FLD AUTO: 10.41 K/UL — SIGNIFICANT CHANGE UP (ref 3.8–10.5)

## 2023-08-04 PROCEDURE — 99239 HOSP IP/OBS DSCHRG MGMT >30: CPT

## 2023-08-04 PROCEDURE — 99232 SBSQ HOSP IP/OBS MODERATE 35: CPT | Mod: GC

## 2023-08-04 RX ORDER — IBUPROFEN 200 MG
1 TABLET ORAL
Qty: 15 | Refills: 0
Start: 2023-08-04 | End: 2023-08-08

## 2023-08-04 RX ORDER — IBUPROFEN 200 MG
1 TABLET ORAL
Qty: 0 | Refills: 0 | DISCHARGE
Start: 2023-08-04

## 2023-08-04 RX ADMIN — Medication 400 MILLIGRAM(S): at 13:33

## 2023-08-04 RX ADMIN — AMLODIPINE BESYLATE 5 MILLIGRAM(S): 2.5 TABLET ORAL at 06:08

## 2023-08-04 RX ADMIN — PANTOPRAZOLE SODIUM 40 MILLIGRAM(S): 20 TABLET, DELAYED RELEASE ORAL at 06:08

## 2023-08-04 RX ADMIN — Medication 400 MILLIGRAM(S): at 07:08

## 2023-08-04 RX ADMIN — Medication 400 MILLIGRAM(S): at 06:08

## 2023-08-04 NOTE — DISCHARGE NOTE NURSING/CASE MANAGEMENT/SOCIAL WORK - NSDCPNINST_GEN_ALL_CORE
Notify MD if experiencing fever, nausea, vomiting, persistent diarrhea, increased abd pain or swelling.

## 2023-08-04 NOTE — DISCHARGE NOTE NURSING/CASE MANAGEMENT/SOCIAL WORK - NSDCPEFALRISK_GEN_ALL_CORE
For information on Fall & Injury Prevention, visit: [email or web address] OR  [email or web address] OR  https://www.cdc.gov/steadi/patient.html

## 2023-08-04 NOTE — PROGRESS NOTE ADULT - PROVIDER SPECIALTY LIST ADULT
Gastroenterology
Surgery
Gastroenterology
Internal Medicine
Internal Medicine
Gastroenterology
Surgery
Hospitalist
Hospitalist
Internal Medicine

## 2023-08-04 NOTE — DISCHARGE NOTE NURSING/CASE MANAGEMENT/SOCIAL WORK - PATIENT PORTAL LINK FT
You can access the FollowMyHealth Patient Portal offered by [facility] by registering at the following website: [email or web address]. By joining [facility]’s FollowMyHealth portal, you will also be able to view your health information using other applications (apps) compatible with our system.

## 2023-08-04 NOTE — PROGRESS NOTE ADULT - ATTENDING COMMENTS
58 year old female with history of HTN, GERD, and elevated liver chemistries who presents with abdominal pain after ERCP.    # Post-ERCP pancreatitis  -EGD/EUS/ERCP on 7/24/2023 that revealed 2cm hiatal hernia, copious CBD sludge, and CBD dilatation to ampulla, stenotic papilla, 5F x 6cm single pigtail PD stent, biliary sphincterotomy, and sludge/small fragments removed using balloon catheter  -upon admission, severe/sharp epigastric pain, lipase >3000, liver chemistries normal, CT A/P with severe acute interstitial edematous pancreatitis with extensive peripancreatic edema/fluid    Recommendations:  -trend clinical symptoms, exam findings, vital signs, CBC, CMP, INR  -complete infectious workup and f/u BCx  -aggressive IVF  -pain control and antiemetics as QTc allows  -urinalysis +/- UCx for suprapubic pain/pressure
Abdominal Pain improving  Doing well.
Agree with above  Agree with obtaining CT Scan given persistent abdominal pain x 3 days + bandemia (although WBC overall is decreasing)  Pleural effusions on Xray likely related to IVF
I have personally interviewed and examined this patient, reviewed pertinent labs and imaging, and discussed the case with colleagues, residents, and physician assistants on B Team rounds.  More than 50% of this 30 minute encounter including face to face with the patient was spent counseling and/or coordination of care on post-ercp pancreatitis.  Time included review of vitals, labs, imaging, discussion with consultants and coordination with the operating room/emergency department.    59yo F admitted with post-ercp pancreatitis. Pt reports small improvement in pain this am. Tolerating clears and small amounts of regular diet. Notes pain in back - would be interested in trial of flexeril and holistic health for possible acupuncture, if available.     - suggest adding flexeril and holistic evaluation to current regimen  - Cont regular diet as tolerated  - agree with holding abx, no evidence of infected pancreatitis at this time     The active care issues are:  1. pancreatitis     The Acute Care Surgery (B Team) Attending Group Practice:  Dr. Sridevi Thompson    urgent issues - spectra [phone]  nonurgent issues - [phone]  patient appointments or afterhours - [phone]
GI team following for post ERCP pancreatitis.   Abdominal pain improving   +sob and wheezing overnight  + LE edema   WBC 11.09 today     Likely fluid overload from IVF hydration  d/c ivf   f/u ct abd/pelvis done earlier today   pain control   serial abd exams   GI to follow, please call w questions
GI team following for post ERCP pancreatitis.   Abdominal pain improving   IVF d/c'd due to concern for fluid overload   repeat ct abd/pelvis yesterday 7/29 --> necrotizing pancreatitis, inc mild to moderate ascites, small bilateral pleural effusions, no panc collections  WBC downtrended to 9     pain control   serial abd exams   diet as tolerated   GI to follow, please call w questions .
Patient seen and examined with the GI fellow. I agree with the above assessment and plan. Thank you for allowing us to care for your patient.    Pain improving. Continue to monitor.  Her only med is tylenol.  We should consider d/c planning when diarrhea improves.
Patient seen and examined with the GI fellow. I agree with the above assessment and plan. Thank you for allowing us to care for your patient.    Post-ERCP pancreatitis.  Continue current management
Patient seen and examined with the GI fellow. I agree with the above assessment and plan. Thank you for allowing us to care for your patient.    Pt with PEP and Clatest CT with small area of nonenhancement in the head concerning for nec pancreatitis. She is improving clinically as her pain has improved, WBC down and normalized, pain medicine requirement is down, and tachycardia improved. She advnaced her diet to solids today. Plan would be for possible discharge later this week if she keeps trending in the right direction.     I sat down with her and explained everything to her for a while today and I have been in touch with her  who is a physician via cellphone.
58 year old female with a history of GERD and HTN presenting with severe epigastric pain and vomiting this AM. had ERCP done yesterday with PD stent placed, procedure went well. woke up this morning with severe pain and 1 episode of vomiting.    patient seen and examined at bedside. patient endorses ongoing abdominal pain, maybe slightly improved from yesterday. had nausea/vomiting overnight which improved with Compazine. will try to drink liquids later if she is feeling better.    #Post-ERCP pancreatitis  -c/w IVF  -pain control with Dilaudid 2mg q3h PRN for severe pain; 1mg q3h PRN for moderate pain  -clear liquid diet  -Compazine PRN for nausea/vomiting  -bowel regimen PRN while on opioids  -CT abdomen/pelvis reviewed with findings of "severe acute interstitial edematous pancreatitis with extensive peripancreatic edema/fluid" and PD stent now migrated to the terminal ileum/ascending colon  -leukocytosis likely reactive; afebrile, no signs of necrosis on CT- monitor off abx  -appreciate GI recommendations    d/w resident Dr. Fong
58 year old female with a history of GERD and HTN presenting with severe epigastric pain and vomiting this AM. had ERCP on July 24 with PD stent placed, procedure went well, developed severe pain and vomiting after procedure.    #Post-ERCP pancreatitis  -peripheral edema improving after discontinuing fluids  -pain more tolerable today after adding ATC ketorolac, c/w Dilaudid 2mg q3h PRN for severe pain; 1mg q3h PRN for moderate pain  -tolerating regular diet  -repeat CT with pancreatic necrosis, no drainable collection; appreciate general surgery and GI recommendations  -c/w pip-tazo IV  -bowel regimen while on opioids
58F with hx of GERD, HTN, who presents with epigastric pain and vomiting admitted with post ERCP pancreatitis. CTAP with pancreatic necrosis - per GI/surgery low suspicion for necrotizing pancreatitis, no intervention, rec to dc further abx. Tolerating regular diet. Pain control - trial ATC Tylenol + Ibuprofen with PRN Dilaudid. Still requiring IV PRN Dilaudid, wean to PO regimen as tolerated. Rest of care as above.
58 year old female with a history of GERD and HTN presenting with severe epigastric pain and vomiting this AM. had ERCP done yesterday with PD stent placed, procedure went well. woke up this morning with severe pain and 1 episode of vomiting.    patient seen and examined at bedside. patient still with abdominal pain. also having urinary urgency but not having a lot of urine come out. no BM since before admission- abd a bit more distended. no longer having nausea/vomiting. tolerating small amounts of water.    #Post-ERCP pancreatitis  -c/w IVF  -pain control with Dilaudid 2mg q3h PRN for severe pain; 1mg q3h PRN for moderate pain  -clear liquid diet  -repeat abdominal x-ray with nonobstructive bowel gas pattern noted  -Compazine PRN for nausea/vomiting  -bowel regimen PRN while on opioids- will give Dulcolax suppository today as pt likely also has opioid-induced constipation  -CT abdomen/pelvis reviewed with findings of "severe acute interstitial edematous pancreatitis with extensive peripancreatic edema/fluid" and PD stent now migrated to the terminal ileum/ascending colon  -appreciate GI recommendations    #Urinary urgency and frequency  -UA with some WBCs and bacteria  -started empirically on CTX  -f/u urine culture    d/w resident Dr. Fong
58F with hx of GERD, HTN, who presents with epigastric pain and vomiting admitted with post ERCP pancreatitis. CTAP with pancreatic necrosis - per GI/surgery low suspicion for necrotizing pancreatitis, no intervention, rec to dc further abx. Tolerating regular diet. Pain control - trial ATC Tylenol + Ibuprofen with PRN Dilaudid. Rest of care as above.
58 year old female with a history of GERD and HTN presenting with severe epigastric pain and vomiting this AM. had ERCP done yesterday with PD stent placed, procedure went well. woke up this morning with severe pain and 1 episode of vomiting.    patient seen and examined at bedside. family at bedside. pt endorses continued severe epigastric pain, no improvement since admission. lower abdominal pressure and urinary urgency improved. no nausea/vomiting, tolerating small amounts of liquid. had loose stool after enema was given yesterday.    #Post-ERCP pancreatitis  -c/w IVF  -pain control with Dilaudid 2mg q3h PRN for severe pain; 1mg q3h PRN for moderate pain  -full liquid diet, advance as tolerated  -repeat abdominal x-ray with nonobstructive bowel gas pattern noted  -CBC with bandemia noted today of 21.7%, Tmax 99.8 yesterday, persistent tachycardia noted  -repeat CTAP with IV contrast ordered to evaluate for infection/necrosis given persistent pain after 3 days- called to expedite test  -start Zosyn empirically  -bowel regimen while on opioids  -CT abdomen/pelvis on admission with findings of "severe acute interstitial edematous pancreatitis with extensive peripancreatic edema/fluid" and PD stent now migrated to the terminal ileum/ascending colon  -appreciate GI recommendations    #Urinary urgency and frequency  -UA with some WBCs and bacteria  -urine culture without growth- d/santi Ceftriaxone  -c/w bowel regimen    d/w resident Dr. Fong, GI Dr. Chua
58 year old female with a history of GERD and HTN presenting with severe epigastric pain and vomiting this AM. had ERCP on July 24 with PD stent placed, procedure went well, developed severe pain and vomiting after procedure.    #Post-ERCP pancreatitis  -d/c further fluids as patient with 1+ edema on legs today  -pain control with Dilaudid 2mg q3h PRN for severe pain; 1mg q3h PRN for moderate pain  -full liquid diet, advance as tolerated  -repeat abdominal x-ray with nonobstructive bowel gas pattern noted  -CBC with bandemia noted, awaiting repeat CTAP with IV contrast ordered to evaluate for infection/necrosis given persistent pain; called radiology to expedite on July 29  -c/w Zosyn empirically  -bowel regimen while on opioids  -appreciate GI recommendations    #Urinary urgency and frequency  -UA with some WBCs and bacteria  -urine culture without growth- d/santi Ceftriaxone  -c/w bowel regimen

## 2023-08-04 NOTE — PROGRESS NOTE ADULT - ATTENDING SUPERVISION STATEMENT
Fellow
Resident
Fellow
Resident

## 2023-08-04 NOTE — PROGRESS NOTE ADULT - SUBJECTIVE AND OBJECTIVE BOX
Gastroenterology/Hepatology Progress Note    Interval Events:   - no BM during daytime   - eating well   - improvement in abd pain   - 3 BM overnight   - patient feels well, would like to be discharged     Allergies:  No Known Allergies      Hospital Medications:  aluminum hydroxide/magnesium hydroxide/simethicone Suspension 30 milliLiter(s) Oral every 4 hours PRN  amLODIPine   Tablet 5 milliGRAM(s) Oral daily  bisacodyl Suppository 10 milliGRAM(s) Rectal daily PRN  famotidine    Tablet 20 milliGRAM(s) Oral at bedtime  HYDROmorphone   Tablet 2 milliGRAM(s) Oral every 4 hours PRN  HYDROmorphone   Tablet 1 milliGRAM(s) Oral every 4 hours PRN  ibuprofen  Tablet. 400 milliGRAM(s) Oral three times a day  lidocaine   4% Patch 1 Patch Transdermal every 24 hours  melatonin 3 milliGRAM(s) Oral at bedtime PRN  ondansetron Injectable 4 milliGRAM(s) IV Push every 8 hours PRN  pantoprazole    Tablet 40 milliGRAM(s) Oral before breakfast  polyethylene glycol 3350 17 Gram(s) Oral daily  simethicone 80 milliGRAM(s) Chew every 6 hours PRN      ROS: 14 point ROS negative unless otherwise state in subjective    PHYSICAL EXAM:   Vital Signs:  Vital Signs Last 24 Hrs  T(C): 36.6 (04 Aug 2023 10:11), Max: 37.2 (03 Aug 2023 17:16)  T(F): 97.8 (04 Aug 2023 10:11), Max: 98.9 (03 Aug 2023 17:16)  HR: 103 (04 Aug 2023 10:11) (99 - 105)  BP: 111/70 (04 Aug 2023 10:11) (111/70 - 130/79)  BP(mean): --  RR: 16 (04 Aug 2023 10:11) (16 - 18)  SpO2: 99% (04 Aug 2023 10:11) (95% - 99%)    Parameters below as of 04 Aug 2023 10:11  Patient On (Oxygen Delivery Method): room air      Daily     Daily     GENERAL:  No acute distress  HEENT:  NCAT, no scleral icterus  CHEST: no resp distress  HEART:  RRR  ABDOMEN:  Soft, non-tender, non-distended, no masses  EXTREMITIES:  No cyanosis, clubbing, or edema  SKIN:  No rash/erythema/ecchymoses/petechiae/wounds/abscess/warm/dry  NEURO:  Alert and oriented x 3    LABS:                        12.0   10.41 )-----------( 297      ( 04 Aug 2023 05:40 )             36.8     Mean Cell Volume: 93.2 fL (08-04-23 @ 05:40)    08-04    137  |  102  |  14  ----------------------------<  94  4.4   |  23  |  0.62    Ca    8.8      04 Aug 2023 05:40          Urinalysis Basic - ( 04 Aug 2023 05:40 )    Color: x / Appearance: x / SG: x / pH: x  Gluc: 94 mg/dL / Ketone: x  / Bili: x / Urobili: x   Blood: x / Protein: x / Nitrite: x   Leuk Esterase: x / RBC: x / WBC x   Sq Epi: x / Non Sq Epi: x / Bacteria: x            Imaging:

## 2023-08-04 NOTE — PROGRESS NOTE ADULT - ASSESSMENT
58 year old female with history of HTN, GERD, and elevated liver chemistries who presents with abdominal pain after ERCP.    # Post-ERCP pancreatitis  -EGD/EUS/ERCP on 7/24/2023 that revealed 2cm hiatal hernia, copious CBD sludge, and CBD dilatation to ampulla, stenotic papilla, 5F x 6cm single pigtail PD stent, biliary sphincterotomy, and sludge/small fragments removed using balloon catheter  -upon admission, severe/sharp epigastric pain, lipase >3000, liver chemistries normal, CT A/P with severe acute interstitial edematous pancreatitis with extensive peripancreatic edema/fluid  -CT A/P 7/29/2023 with small hypoenhancement in pancreas c/w necrotizing pancreatitis, no pancreatic collections, mild to moderate ascites, and small bilateral pleural effusions    #diarrhea - likely related to run-off stool from previous constipation given patient's abdomen distension much improved with bowel movements   - GI PCR negative     Recommendations:  -trend clinical symptoms, exam findings, vital signs, CBC, CMP, INR  -discontinued IVF given edema, wheezing, and pulmonary infiltrates on CXR  -would not recommend antibiotics for small hypoenhancement of pancreas read as necrotizing pancreatitis  -pain control and antiemetics as QTc allows, which patient is clinically improving on - hopeful for transition to oral pain regimen soon  -ensure adequate bowel regimen +/- simethicone as needed to minimize risk for "gas pains"  -Please provide patient with [facility] outpatient follow up number for an appointment [phone]   -patient stable for discharge from advanced GI perspective     All recommendations are tentative until note is attested by attending.     Rachelle Mayorga, PGY-5  Gastroenterology/Hepatology Fellow  Available on [employer] Teams  [phone] ([facility] Short [facility] Pager)  [phone] ([facility] Pager)    After 5pm, please contact the on-call GI fellow. [phone]

## 2023-08-05 ENCOUNTER — TRANSCRIPTION ENCOUNTER (OUTPATIENT)
Age: 58
End: 2023-08-05

## 2023-08-08 DIAGNOSIS — K80.50 CALCULUS OF BILE DUCT W/OUT CHOLANGITIS OR CHOLECYSTITIS W/OUT OBSTRUCTION: ICD-10-CM

## 2023-08-10 ENCOUNTER — APPOINTMENT (OUTPATIENT)
Dept: GASTROENTEROLOGY | Facility: CLINIC | Age: 58
End: 2023-08-10
Payer: COMMERCIAL

## 2023-08-10 VITALS
HEART RATE: 95 BPM | DIASTOLIC BLOOD PRESSURE: 70 MMHG | OXYGEN SATURATION: 98 % | BODY MASS INDEX: 24.99 KG/M2 | TEMPERATURE: 97.7 F | SYSTOLIC BLOOD PRESSURE: 120 MMHG | WEIGHT: 150 LBS | HEIGHT: 65 IN

## 2023-08-10 DIAGNOSIS — R74.8 ABNORMAL LEVELS OF OTHER SERUM ENZYMES: ICD-10-CM

## 2023-08-10 DIAGNOSIS — Z87.19 PERSONAL HISTORY OF OTHER DISEASES OF THE DIGESTIVE SYSTEM: ICD-10-CM

## 2023-08-10 PROCEDURE — 99213 OFFICE O/P EST LOW 20 MIN: CPT

## 2023-08-11 PROBLEM — Z87.19 HISTORY OF PANCREATITIS: Status: ACTIVE | Noted: 2023-08-11

## 2023-08-11 LAB
ALBUMIN SERPL ELPH-MCNC: 4 G/DL
ALP BLD-CCNC: 119 U/L
ALT SERPL-CCNC: 46 U/L
AMYLASE/CREAT SERPL: 121 U/L
ANION GAP SERPL CALC-SCNC: 12 MMOL/L
AST SERPL-CCNC: 23 U/L
BILIRUB SERPL-MCNC: 0.3 MG/DL
BUN SERPL-MCNC: 22 MG/DL
CALCIUM SERPL-MCNC: 10.1 MG/DL
CHLORIDE SERPL-SCNC: 102 MMOL/L
CO2 SERPL-SCNC: 26 MMOL/L
CREAT SERPL-MCNC: 0.75 MG/DL
EGFR: 92 ML/MIN/1.73M2
GLUCOSE SERPL-MCNC: 130 MG/DL
LPL SERPL-CCNC: 123 U/L
POTASSIUM SERPL-SCNC: 4.3 MMOL/L
PROT SERPL-MCNC: 7.1 G/DL
SODIUM SERPL-SCNC: 140 MMOL/L

## 2023-08-11 NOTE — ASSESSMENT
Tmax 100.6, cultures sent. Intermittently tachycardic. Other VSS. Sats stable on room air. Pt c/o back and mouth pain, 7/10. Increased continuous dilaudid gtt with little relief. Pt took 5 bumps on eves and 8 bumps overnight. PRN valium given x 1 with some relief. No reports of nausea. Good PO intake. Urine appears andree, stool x 2. 1 unit of pRBCs given, no issues noted. Platelets infusing. Magnesium given, plan to recheck level this morning. Mom at bedside. Hourly rounding complete. Will continue to monitor and assess.    [FreeTextEntry1] : Patient came to the office today for evaluation and discussion.  She was recently hospitalized for acute pancreatitis and is doing much better at this time.  She will have blood work drawn today and I will recommend follow-up CT scan in about a week or so.  We are looking to see that pancreatitis has improved and that previously placed pancreatic stent has passed through the colon.  Further suggestions will follow pending results of blood work etc.

## 2023-08-11 NOTE — HISTORY OF PRESENT ILLNESS
[FreeTextEntry1] : Patient returns for follow-up and discussion.  She was recently hospitalized for acute pancreatitis after ERCP.  Patient had previously been complaining about acute right upper quadrant pain investigation including MRI and CT were unrevealing.  Patient went for endoscopic ultrasound demonstrating sludge and stones within the common bile duct.  ERCP was performed and patient subsequently developed pancreatitis.  She developed acute pain and associated symptomatology.  Patient was admitted and remained in hospital for about 10 days.  She is now discharged feeling much better compared to previous she still has some residual bloating and vague mild pain but overall is much improved.  She denies current nausea vomiting fever chills rectal bleeding or melena.  She is being careful with her diet.

## 2023-08-18 ENCOUNTER — OUTPATIENT (OUTPATIENT)
Dept: OUTPATIENT SERVICES | Facility: HOSPITAL | Age: 58
LOS: 1 days | End: 2023-08-18
Payer: COMMERCIAL

## 2023-08-18 ENCOUNTER — APPOINTMENT (OUTPATIENT)
Dept: CT IMAGING | Facility: IMAGING CENTER | Age: 58
End: 2023-08-18
Payer: COMMERCIAL

## 2023-08-18 DIAGNOSIS — Z98.890 OTHER SPECIFIED POSTPROCEDURAL STATES: Chronic | ICD-10-CM

## 2023-08-18 DIAGNOSIS — Z00.00 ENCOUNTER FOR GENERAL ADULT MEDICAL EXAMINATION WITHOUT ABNORMAL FINDINGS: ICD-10-CM

## 2023-08-18 DIAGNOSIS — Z90.49 ACQUIRED ABSENCE OF OTHER SPECIFIED PARTS OF DIGESTIVE TRACT: Chronic | ICD-10-CM

## 2023-08-18 DIAGNOSIS — S82.852S DISPLACED TRIMALLEOLAR FRACTURE OF LEFT LOWER LEG, SEQUELA: Chronic | ICD-10-CM

## 2023-08-18 PROCEDURE — 74177 CT ABD & PELVIS W/CONTRAST: CPT | Mod: 26

## 2023-08-18 PROCEDURE — 74177 CT ABD & PELVIS W/CONTRAST: CPT

## 2023-08-25 ENCOUNTER — OUTPATIENT (OUTPATIENT)
Dept: OUTPATIENT SERVICES | Facility: HOSPITAL | Age: 58
LOS: 1 days | End: 2023-08-25
Payer: COMMERCIAL

## 2023-08-25 ENCOUNTER — APPOINTMENT (OUTPATIENT)
Dept: RADIOLOGY | Facility: IMAGING CENTER | Age: 58
End: 2023-08-25
Payer: COMMERCIAL

## 2023-08-25 DIAGNOSIS — K80.50 CALCULUS OF BILE DUCT WITHOUT CHOLANGITIS OR CHOLECYSTITIS WITHOUT OBSTRUCTION: ICD-10-CM

## 2023-08-25 DIAGNOSIS — S82.852S DISPLACED TRIMALLEOLAR FRACTURE OF LEFT LOWER LEG, SEQUELA: Chronic | ICD-10-CM

## 2023-08-25 DIAGNOSIS — Z98.890 OTHER SPECIFIED POSTPROCEDURAL STATES: Chronic | ICD-10-CM

## 2023-08-25 DIAGNOSIS — Z90.49 ACQUIRED ABSENCE OF OTHER SPECIFIED PARTS OF DIGESTIVE TRACT: Chronic | ICD-10-CM

## 2023-08-25 PROCEDURE — 74019 RADEX ABDOMEN 2 VIEWS: CPT | Mod: 26

## 2023-08-25 PROCEDURE — 74019 RADEX ABDOMEN 2 VIEWS: CPT

## 2023-08-29 ENCOUNTER — NON-APPOINTMENT (OUTPATIENT)
Age: 58
End: 2023-08-29

## 2023-10-19 NOTE — DIETITIAN INITIAL EVALUATION ADULT - ENERGY INTAKE
Aspen Celestin  [address] [place] [address]  [place] [address]    Dear Aspen,      We are so glad we were able to help you plan your Medicare Wellness Visit. Your visit date is on 11/30/2023  at 2:00  with Dr. Mcmillan.     Get ready for your Medicare Wellness Visit  Here is what you need to know      What is a Medicare Wellness Visit?   A chance to meet with your primary care doctor to make a preventive wellness plan just for you. Having a wellness plan can help lower your risk for illness or injury.   This visit is covered by Medicare once every 12 months*.      At your visit your doctor will review:  Your medical and family history.   Your responses on the card (included) and any questions you may have about these topics.   Current health conditions.  Your current medications and supplements.  Preventive care such as cancer screenings, eye exams and immunizations.    Make the most out of your visit. Please bring the enclosed card and the items listed to your visit. The more prepared you are for your visit, the more time you and your doctor will have to talk about your health and make your personalized wellness plan.     Be well!    * Check your Medicare plan to review the specifics of your coverage. If you receive additional tests or services during your annual wellness visit that are not part of your preventive benefits, you may have a co-pay and the Part B deductible may apply.   Poor (<50%)

## 2024-01-30 ENCOUNTER — APPOINTMENT (OUTPATIENT)
Dept: MRI IMAGING | Facility: IMAGING CENTER | Age: 59
End: 2024-01-30
Payer: COMMERCIAL

## 2024-01-30 ENCOUNTER — OUTPATIENT (OUTPATIENT)
Dept: OUTPATIENT SERVICES | Facility: HOSPITAL | Age: 59
LOS: 1 days | End: 2024-01-30
Payer: COMMERCIAL

## 2024-01-30 DIAGNOSIS — Z98.890 OTHER SPECIFIED POSTPROCEDURAL STATES: Chronic | ICD-10-CM

## 2024-01-30 DIAGNOSIS — S82.852S DISPLACED TRIMALLEOLAR FRACTURE OF LEFT LOWER LEG, SEQUELA: Chronic | ICD-10-CM

## 2024-01-30 DIAGNOSIS — Z90.49 ACQUIRED ABSENCE OF OTHER SPECIFIED PARTS OF DIGESTIVE TRACT: Chronic | ICD-10-CM

## 2024-01-30 DIAGNOSIS — Z00.00 ENCOUNTER FOR GENERAL ADULT MEDICAL EXAMINATION WITHOUT ABNORMAL FINDINGS: ICD-10-CM

## 2024-01-30 PROCEDURE — 74183 MRI ABD W/O CNTR FLWD CNTR: CPT | Mod: 26

## 2024-01-30 PROCEDURE — A9585: CPT

## 2024-01-30 PROCEDURE — 74183 MRI ABD W/O CNTR FLWD CNTR: CPT

## 2024-10-02 NOTE — PROGRESS NOTE ADULT - PROBLEM SELECTOR PROBLEM 3
Prophylactic measure Hypoxia Quality 130: Documentation Of Current Medications In The Medical Record: Current Medications Documented Detail Level: Detailed Quality 485: Psoriasis - Improvement In Patient-Reported Itch Severity: Itch severity assessment score is reduced by 3 or more points from the initial (index) assessment score to the follow-up visit score Quality 226: Preventive Care And Screening: Tobacco Use: Screening And Cessation Intervention: Patient screened for tobacco use and is an ex/non-smoker Quality 410: Psoriasis Clinical Response To Oral Systemic Or Biologic Medications: Psoriasis Assessment Tool Documented, Met Specified Benchmark

## 2024-12-11 DIAGNOSIS — J06.9 ACUTE UPPER RESPIRATORY INFECTION, UNSPECIFIED: ICD-10-CM

## 2024-12-12 LAB
RAPID RVP RESULT: NOT DETECTED
SARS-COV-2 RNA RESP QL NAA+PROBE: NOT DETECTED

## (undated) DEVICE — ESU ERBE 044850: Type: DURABLE MEDICAL EQUIPMENT

## (undated) DEVICE — DENTURE CUP PINK

## (undated) DEVICE — SYR LUER LOK 10CC

## (undated) DEVICE — PACK IV START WITH CHG

## (undated) DEVICE — SYR LUER LOK 3CC

## (undated) DEVICE — DVC AUTO CAP RX LOKG

## (undated) DEVICE — SENSOR O2 FINGER ADULT

## (undated) DEVICE — SALIVA EJECTOR (BLUE)

## (undated) DEVICE — OMNIPAQUE 300  30ML

## (undated) DEVICE — TUBING SUCTION NONCONDUCTIVE 6MM X 12FT

## (undated) DEVICE — ELCTR ECG CONDUCTIVE ADHESIVE

## (undated) DEVICE — UNDERPAD LINEN SAVER 17 X 24"

## (undated) DEVICE — BASIN EMESIS 10IN GRADUATED MAUVE

## (undated) DEVICE — CATH IV SAFE BC 22G X 1" (BLUE)

## (undated) DEVICE — DRSG CURITY GAUZE SPONGE 4 X 4" 12-PLY NON-STERILE

## (undated) DEVICE — SOL IRR POUR NS 0.9% 500ML

## (undated) DEVICE — LINE BREATHE SAMPLNG

## (undated) DEVICE — INJ SYS RAP REFIL

## (undated) DEVICE — DRSG BANDAID 0.75X3"

## (undated) DEVICE — BITE BLOCK ADULT 20 X 27MM (GREEN)

## (undated) DEVICE — NDL HYPO SAFE 22G X 1" (BLACK)

## (undated) DEVICE — OLYMPUS DISTAL COVER ENDOSCOPE

## (undated) DEVICE — GOWN LG

## (undated) DEVICE — SOL INJ NS 0.9% 500ML 1-PORT

## (undated) DEVICE — DRSG 2X2